# Patient Record
Sex: FEMALE | Race: WHITE | NOT HISPANIC OR LATINO | Employment: OTHER | URBAN - METROPOLITAN AREA
[De-identification: names, ages, dates, MRNs, and addresses within clinical notes are randomized per-mention and may not be internally consistent; named-entity substitution may affect disease eponyms.]

---

## 2017-04-20 RX ORDER — CLONAZEPAM 1 MG/1
TABLET ORAL
Qty: 180 TABLET | Refills: 4 | Status: SHIPPED | OUTPATIENT
Start: 2017-04-20 | End: 2017-05-30 | Stop reason: SDUPTHER

## 2017-05-24 ENCOUNTER — TELEPHONE (OUTPATIENT)
Dept: NEUROLOGY | Facility: CLINIC | Age: 67
End: 2017-05-24

## 2017-05-24 NOTE — TELEPHONE ENCOUNTER
----- Message from Lorenza Pennington sent at 5/24/2017  1:20 PM CDT -----  Contact: Self  Good afternoon,    Pt would like a call back regarding medication interactions. (pt wouldn't give anymore info)    Pt can be reached at     Thank you!

## 2017-05-26 ENCOUNTER — TELEPHONE (OUTPATIENT)
Dept: SLEEP MEDICINE | Facility: CLINIC | Age: 67
End: 2017-05-26

## 2017-05-26 NOTE — TELEPHONE ENCOUNTER
Spoke with pt. She states that Medicare has a quantity limit on clonazepam for 1 a day. Pt needs quantity exception. Medicare phone number on her correspondence is [phone]

## 2017-05-26 NOTE — TELEPHONE ENCOUNTER
----- Message from Belle Sonu sent at 5/26/2017  1:59 PM CDT -----  Contact: pt   X_  1st Request  _  2nd Request  _  3rd Request    Who:LISA MCKEON [[id number]]    Why: Patient states she would like to speak with the staff in regards to her medication not being covered under medicare .... Please contact patient to further discuss and advise      What Number to Call Back: [phone]    When to Expect a call back: (Before the end of the day)   -- if call after 3:00 call back will be tomorrow.

## 2017-05-29 ENCOUNTER — TELEPHONE (OUTPATIENT)
Dept: NEUROLOGY | Facility: CLINIC | Age: 67
End: 2017-05-29

## 2017-05-29 NOTE — TELEPHONE ENCOUNTER
----- Message from Piotr Rivera MD sent at 5/26/2017  2:56 PM CDT -----  Spoke with pt. She states that Medicare has a quantity limit on clonazepam for 1 a day. Pt needs quantity exception. Medicare phone number on her correspondence is [phone]    Documentation     Deepika Dubon MA 45 minutes ago (2:10 PM)     ----- Message from Belle Ascencio sent at 5/26/2017  1:59 PM CDT -----  To call patient re: letter that she got from her insurance.  _  3rd Request     Who:LISA MCKEON [[id number]]     Why: Patient states she would like to speak with the staff in regards to her medication not being covered under medicare .... Please contact patient to further discuss and advise       What Number to Call Back: [phone]     When to Expect a call back: (Before the end of the day)                        -- if call after 3:00 call back will be tomorrow.

## 2017-05-30 ENCOUNTER — TELEPHONE (OUTPATIENT)
Dept: NEUROLOGY | Facility: CLINIC | Age: 67
End: 2017-05-30

## 2017-05-30 ENCOUNTER — PATIENT MESSAGE (OUTPATIENT)
Dept: NEUROLOGY | Facility: CLINIC | Age: 67
End: 2017-05-30

## 2017-05-30 DIAGNOSIS — G40.209 PARTIAL EPILEPSY WITH IMPAIRMENT OF CONSCIOUSNESS: Primary | ICD-10-CM

## 2017-05-30 RX ORDER — CLONAZEPAM 1 MG/1
TABLET ORAL
Qty: 60 TABLET | Refills: 5 | Status: SHIPPED | OUTPATIENT
Start: 2017-05-30 | End: 2017-11-28 | Stop reason: SDUPTHER

## 2017-05-30 RX ORDER — CLONAZEPAM 2 MG/1
2 TABLET ORAL 2 TIMES DAILY
Qty: 60 TABLET | Refills: 5 | Status: SHIPPED | OUTPATIENT
Start: 2017-05-30 | End: 2017-11-26

## 2017-05-30 NOTE — TELEPHONE ENCOUNTER
----- Message from Barbra Malcolm sent at 5/30/2017 12:23 PM CDT -----  Contact: Self  X   1st Request  _  2nd Request  _  3rd Request        Who: LISA MCKEON [[id number]]    Why: ;Pt states she would like to speak to the clinical team regarding medication. Pt provided no further details. Please call,thanks!    What Number to Call Back: [phone]    When to Expect a call back: (Before the end of the day)   -- if the call is after 12:00, the call back will be tomorrow.

## 2017-10-05 DIAGNOSIS — G40.209 COMPLEX PARTIAL SEIZURES WITH CONSCIOUSNESS IMPAIRED: ICD-10-CM

## 2017-10-05 RX ORDER — LEVETIRACETAM 750 MG/1
TABLET ORAL
Qty: 120 TABLET | Refills: 11 | Status: SHIPPED | OUTPATIENT
Start: 2017-10-05 | End: 2018-08-29 | Stop reason: SDUPTHER

## 2017-10-08 DIAGNOSIS — G40.209 COMPLEX PARTIAL SEIZURES WITH CONSCIOUSNESS IMPAIRED: ICD-10-CM

## 2017-10-09 RX ORDER — LAMOTRIGINE 200 MG/1
TABLET ORAL
Qty: 60 TABLET | Refills: 10 | Status: SHIPPED | OUTPATIENT
Start: 2017-10-09 | End: 2018-08-29 | Stop reason: SDUPTHER

## 2017-11-24 DIAGNOSIS — G40.209 PARTIAL EPILEPSY WITH IMPAIRMENT OF CONSCIOUSNESS: ICD-10-CM

## 2017-11-28 DIAGNOSIS — G40.209 PARTIAL EPILEPSY WITH IMPAIRMENT OF CONSCIOUSNESS: ICD-10-CM

## 2017-11-28 RX ORDER — CLONAZEPAM 1 MG/1
TABLET ORAL
Qty: 60 TABLET | Refills: 5 | Status: SHIPPED | OUTPATIENT
Start: 2017-11-28 | End: 2018-05-17 | Stop reason: SDUPTHER

## 2017-11-28 NOTE — TELEPHONE ENCOUNTER
----- Message from Onelia eMyer sent at 11/28/2017 11:53 AM CST -----  Contact: ONELIA MCKEON [[id number]]      x_  1st Request  _  2nd Request  _  3rd Request    Please refill the medication(s) listed below. Please call the patient when the prescription(s) is ready for  at this phone number   [phone]         Medication #1 clonazePAM (KLONOPIN) 1 MG tablet 60 tablet   Medication #2      Preferred Pharmacy:

## 2017-11-28 NOTE — TELEPHONE ENCOUNTER
Pt was informed Julio Rivera is out of the office, Request this medication be sent to someone to refill. Please advise.

## 2017-11-30 ENCOUNTER — TELEPHONE (OUTPATIENT)
Dept: NEUROLOGY | Facility: CLINIC | Age: 67
End: 2017-11-30

## 2017-11-30 DIAGNOSIS — G40.909 NONINTRACTABLE EPILEPSY WITHOUT STATUS EPILEPTICUS, UNSPECIFIED EPILEPSY TYPE: Primary | ICD-10-CM

## 2017-11-30 RX ORDER — CLONAZEPAM 2 MG/1
2 TABLET ORAL 2 TIMES DAILY
Qty: 60 TABLET | Refills: 5 | Status: SHIPPED | OUTPATIENT
Start: 2017-11-30 | End: 2017-12-30

## 2017-11-30 NOTE — TELEPHONE ENCOUNTER
----- Message from Brandon Pantoja sent at 11/30/2017  9:58 AM CST -----  Contact: Bobbi sawyer/ Melvin Luna @ [phone]  Bobbi says they received script for clonazePAM (KLONOPIN) 1 MG tablet, but pt is asking for clonazePAM (KLONOPIN) 2 MG tablet. Bobbi states pt is currently out of the medication. Pls call.

## 2017-12-04 RX ORDER — CLONAZEPAM 2 MG/1
TABLET ORAL
Qty: 60 TABLET | Refills: 4 | OUTPATIENT
Start: 2017-12-04

## 2017-12-04 RX ORDER — CLONAZEPAM 1 MG/1
TABLET ORAL
Qty: 60 TABLET | Refills: 4 | OUTPATIENT
Start: 2017-12-04

## 2018-01-02 ENCOUNTER — TELEPHONE (OUTPATIENT)
Dept: NEUROLOGY | Facility: CLINIC | Age: 68
End: 2018-01-02

## 2018-05-09 RX ORDER — RALOXIFENE HYDROCHLORIDE 60 MG/1
60 TABLET, FILM COATED ORAL DAILY
Qty: 30 TABLET | Refills: 3 | Status: SHIPPED | OUTPATIENT
Start: 2018-05-09 | End: 2018-08-21

## 2018-05-09 NOTE — TELEPHONE ENCOUNTER
----- Message from Tanvi Jacobs sent at 5/9/2018 11:04 AM CDT -----  [facility]  Can the clinic reply in [facility]:     Please refill the medication(s) listed below. Please call the patient when the prescription(s) is ready for  at this phone number          Medication #1 raloxifene (EVISTA) 60 mg tablet  Medication #2    [facility] [address] [phone]

## 2018-05-17 DIAGNOSIS — G40.209 PARTIAL EPILEPSY WITH IMPAIRMENT OF CONSCIOUSNESS: ICD-10-CM

## 2018-05-17 RX ORDER — CLONAZEPAM 1 MG/1
TABLET ORAL
Qty: 60 TABLET | Refills: 5 | Status: SHIPPED | OUTPATIENT
Start: 2018-05-17 | End: 2018-05-22 | Stop reason: SDUPTHER

## 2018-05-17 NOTE — TELEPHONE ENCOUNTER
----- Message from Claire Archuleta sent at 5/17/2018 12:34 PM CDT -----  Contact: elio  Can the clinic reply in [facility]: no      Please refill the medication(s) listed below. The patient can be reached at this phone number ([phone]_) once it is called into the pharmacy.      Medication #1clonazePAM (KLONOPIN) 1 MG tablet - she wants 2mg      Medication #2      Preferred Pharmacy:[facility] [phone]

## 2018-05-22 ENCOUNTER — TELEPHONE (OUTPATIENT)
Dept: NEUROLOGY | Facility: CLINIC | Age: 68
End: 2018-05-22

## 2018-05-22 DIAGNOSIS — G40.209 PARTIAL EPILEPSY WITH IMPAIRMENT OF CONSCIOUSNESS: ICD-10-CM

## 2018-05-22 RX ORDER — CLONAZEPAM 1 MG/1
TABLET ORAL
Qty: 60 TABLET | Refills: 5 | Status: SHIPPED | OUTPATIENT
Start: 2018-05-22 | End: 2018-09-05 | Stop reason: SDUPTHER

## 2018-05-22 RX ORDER — CLONAZEPAM 2 MG/1
TABLET ORAL
Qty: 60 TABLET | Refills: 5 | Status: SHIPPED | OUTPATIENT
Start: 2018-05-22 | End: 2018-06-11 | Stop reason: SDUPTHER

## 2018-05-22 NOTE — TELEPHONE ENCOUNTER
----- Message from Lisa Meyer sent at 5/22/2018  2:27 PM CDT -----  Contact: LISA MCKEON [[id number]]            Name of Who is Calling:LISA MCKEON [[id number]]      What is the request in detail: Patient called she stated that she is trying to get her medication, the pharmacist tried as well. Please call her as soon as possible, she is down to the last 2 pills. clonazePAM (KLONOPIN) 1 MG tablet 60 tablet       Can the clinic reply by [facility]: Yes      What Number to Call Back if not in [facility]: [phone]

## 2018-06-11 ENCOUNTER — TELEPHONE (OUTPATIENT)
Dept: NEUROLOGY | Facility: CLINIC | Age: 68
End: 2018-06-11

## 2018-06-11 DIAGNOSIS — G40.209 PARTIAL EPILEPSY WITH IMPAIRMENT OF CONSCIOUSNESS: Primary | ICD-10-CM

## 2018-06-11 RX ORDER — BENZONATATE 100 MG/1
CAPSULE ORAL
COMMUNITY
Start: 2018-03-14 | End: 2018-08-21

## 2018-06-11 RX ORDER — AZITHROMYCIN 250 MG/1
TABLET, FILM COATED ORAL
COMMUNITY
Start: 2018-03-15 | End: 2018-08-21

## 2018-06-11 RX ORDER — CLONAZEPAM 2 MG/1
2 TABLET ORAL 2 TIMES DAILY
Qty: 4 TABLET | Refills: 0 | Status: SHIPPED | OUTPATIENT
Start: 2018-06-11 | End: 2018-06-12 | Stop reason: SDUPTHER

## 2018-06-11 RX ORDER — CYCLOSPORINE 0.5 MG/ML
1 EMULSION OPHTHALMIC 2 TIMES DAILY
Refills: 6 | COMMUNITY
Start: 2018-05-29 | End: 2021-08-04

## 2018-06-11 NOTE — TELEPHONE ENCOUNTER
----- Message from Krystyna Shepherd sent at 6/11/2018  8:37 AM CDT -----  Name of Who is Calling: LISA MCKEON [[id number]]      What is the request in detail: Pt would like to speak with staff about lonazePAM (KLONOPIN) 2 MG Tab medication. Please call as soon as possible    Can the clinic reply by [facility]:   No       What [facility] Back [facility]: [phone]

## 2018-06-11 NOTE — TELEPHONE ENCOUNTER
Returned patient's call and she stated that she is leaving for a trip on 6/17 and wish for Dr Villareal to send in a Rx for 4 tablets of Klonopin.  When she had called the pharmacy, they told her that she will have to pay because it's before her refill due date the 19th but she will be on the road and she needs med for seizure prevention.    Last office visit 4/22/16

## 2018-06-12 ENCOUNTER — TELEPHONE (OUTPATIENT)
Dept: NEUROLOGY | Facility: CLINIC | Age: 68
End: 2018-06-12

## 2018-06-12 DIAGNOSIS — G40.209 PARTIAL EPILEPSY WITH IMPAIRMENT OF CONSCIOUSNESS: ICD-10-CM

## 2018-06-12 RX ORDER — CLONAZEPAM 2 MG/1
2 TABLET ORAL 2 TIMES DAILY
Qty: 60 TABLET | Refills: 0 | Status: SHIPPED | OUTPATIENT
Start: 2018-06-12 | End: 2018-06-15 | Stop reason: SDUPTHER

## 2018-06-12 NOTE — TELEPHONE ENCOUNTER
Prescription for Klonopin 2 mg for 4 tabs has been cancelled as her  will  a new prescription at our [place] office for 2mgs # 60 tabs. This is needed for coming vacation.

## 2018-06-12 NOTE — TELEPHONE ENCOUNTER
----- Message from Carolina Fagan sent at 6/12/2018  1:25 PM CDT -----  Contact: LISA MCKEON [[id number]]            Name of Who is Calling: LISA MCKEON [[id number]]      What is the request in detail:pt states that pharmacy never received rx,pt is requesting a call back       Can the clinic reply by [facility]: no      What Number to Call Back if not in [facility]:[phone]

## 2018-06-15 DIAGNOSIS — G40.209 PARTIAL EPILEPSY WITH IMPAIRMENT OF CONSCIOUSNESS: Primary | ICD-10-CM

## 2018-06-15 RX ORDER — CLONAZEPAM 2 MG/1
2 TABLET ORAL 2 TIMES DAILY
Qty: 60 TABLET | Refills: 0 | Status: SHIPPED | OUTPATIENT
Start: 2018-06-15 | End: 2018-07-13 | Stop reason: SDUPTHER

## 2018-07-13 ENCOUNTER — TELEPHONE (OUTPATIENT)
Dept: NEUROLOGY | Facility: CLINIC | Age: 68
End: 2018-07-13

## 2018-07-13 DIAGNOSIS — G40.209 PARTIAL EPILEPSY WITH IMPAIRMENT OF CONSCIOUSNESS: ICD-10-CM

## 2018-07-13 RX ORDER — CLONAZEPAM 2 MG/1
2 TABLET ORAL 2 TIMES DAILY
Qty: 60 TABLET | Refills: 5 | Status: SHIPPED | OUTPATIENT
Start: 2018-07-13 | End: 2018-09-05 | Stop reason: SDUPTHER

## 2018-07-13 NOTE — TELEPHONE ENCOUNTER
----- Message from Asya Garcia sent at 7/13/2018 12:05 PM CDT -----  Contact: LISA MCKEON [[id number]]      Can the clinic reply in [facility]: No      Please refill the medication(s) listed below. Please call the patient when the prescription(s) is ready for  at this phone number: LISA MCKEON / #[phone]      Medication #1 clonazePAM (KLONOPIN) 2 MG Tab      Preferred Pharmacy: [facility]/pharmacy #[id number] - [address]     [phone] (Phone)  [phone] (Fax)

## 2018-07-18 ENCOUNTER — TELEPHONE (OUTPATIENT)
Dept: NEUROLOGY | Facility: CLINIC | Age: 68
End: 2018-07-18

## 2018-07-18 NOTE — TELEPHONE ENCOUNTER
----- Message from Tanvi Jacobs sent at 7/18/2018 11:26 AM CDT -----  Contact: pt            Name of Who is Calling: LISA MCKEON [[id number]]      What is the request in detail: pt is calling in regards to time change on upcoming trip.. Please advise      Can the clinic reply by [facility]: no      What Number to Call Back if not in [facility]: [phone]

## 2018-07-19 ENCOUNTER — TELEPHONE (OUTPATIENT)
Dept: NEUROLOGY | Facility: CLINIC | Age: 68
End: 2018-07-19

## 2018-07-19 NOTE — TELEPHONE ENCOUNTER
----- Message from Belle Ascencio sent at 7/19/2018  2:29 PM CDT -----  Contact: Pt  Name of Who is Calling: LISA MCKEON [[id number]]      What is the request in detail: Patient states she is returning a call      Can the clinic reply by [facility]: No      What Number to Call Back [facility] not in [facility]: [phone]

## 2018-07-31 ENCOUNTER — TELEPHONE (OUTPATIENT)
Dept: NEUROLOGY | Facility: CLINIC | Age: 68
End: 2018-07-31

## 2018-07-31 NOTE — TELEPHONE ENCOUNTER
Discussed with patient.  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  Advised that these should not be a problems as long as she gets a good night sleep and keeps to her schedule of medications.

## 2018-08-01 LAB
CHOL/HDLC RATIO: 2
CHOLEST SERPL-MSCNC: 188 MG/DL (ref 0–200)
HDLC SERPL-MCNC: 80 MG/DL
LDLC SERPL CALC-MCNC: 96 MG/DL
TRIGLYCERIDE (LIPID PAN): 77

## 2018-08-21 ENCOUNTER — OFFICE VISIT (OUTPATIENT)
Dept: NEUROLOGY | Facility: CLINIC | Age: 68
End: 2018-08-21
Payer: COMMERCIAL

## 2018-08-21 VITALS
WEIGHT: 136.69 LBS | SYSTOLIC BLOOD PRESSURE: 105 MMHG | BODY MASS INDEX: 22.77 KG/M2 | HEIGHT: 65 IN | DIASTOLIC BLOOD PRESSURE: 63 MMHG

## 2018-08-21 DIAGNOSIS — G40.209 PARTIAL EPILEPSY WITH IMPAIRMENT OF CONSCIOUSNESS: Primary | ICD-10-CM

## 2018-08-21 PROCEDURE — 99401 PREV MED CNSL INDIV APPRX 15: CPT | Mod: S$GLB,,, | Performed by: PSYCHIATRY & NEUROLOGY

## 2018-08-21 PROCEDURE — 99999 PR PBB SHADOW E&M-EST. PATIENT-LVL III: CPT | Mod: PBBFAC,,, | Performed by: PSYCHIATRY & NEUROLOGY

## 2018-08-21 PROCEDURE — 99214 OFFICE O/P EST MOD 30 MIN: CPT | Mod: S$GLB,,, | Performed by: PSYCHIATRY & NEUROLOGY

## 2018-08-21 RX ORDER — LIFITEGRAST 50 MG/ML
SOLUTION/ DROPS OPHTHALMIC
COMMUNITY
Start: 2018-08-16 | End: 2018-08-21

## 2018-08-21 NOTE — PATIENT INSTRUCTIONS
No medication changes.  Continue present level of activities.  Seizure precautions including compliance stressed. Reviewed immunization history.  Patient counseled about getting her immunization shots and is given a prescription for this.

## 2018-08-21 NOTE — PROGRESS NOTES
Subjective:       Patient ID: Onelia Cain is a 68 y.o. female.    Chief Complaint:  Seizures      History of Present Illness  HPI   This is a 68-year-old female is being followed by me for a seizure disorder. She has had a long history of partial complex seizures characterized by transient alteration in consciousness with amnesia and occasional repetitive movements affecting the hands and mouth. She had an MRI scan of the brain done in 1992 that was normal. An EEG done at that time showed evidence of right frontal sharp focus. She had been doing well with a combination of Keppra, Lamictal and clonazepam and has had no spells in over a year.  In the past her seizures have been brief and were usually triggered by stress at work.  She is now retired.  She does report that she has had occasional episodes when she feels she might have had a spells but this passes.  This has been very infrequent usually triggered by excessive exertion on stress.  she was last seen by me 2 years ago and denies any new medical problems otherwise.  She came to the clinic accompanied by her .  She is planned to her vacation to [place] next month.       Review of Systems  Review of Systems   Constitutional: Negative.    HENT: Negative.  Negative for hearing loss.    Eyes: Negative.  Negative for visual disturbance.   Respiratory: Negative.  Negative for shortness of breath.    Cardiovascular: Negative.  Negative for chest pain and palpitations.   Gastrointestinal: Negative.    Genitourinary: Negative.    Musculoskeletal: Negative.  Negative for back pain, gait problem and neck pain.   Skin: Negative.    Neurological: Positive for seizures. Negative for tremors, syncope, speech difficulty, weakness, numbness and headaches.   Psychiatric/Behavioral: Negative.  Negative for confusion and decreased concentration.       Objective:      Neurologic Exam      Physical Exam   Constitutional: She appears well-developed and  well-nourished.   HENT:   Head: Normocephalic and atraumatic.   Right Ear: Hearing normal.   Left Ear: Hearing normal.   Eyes:   Fundus examination showed sharp disc margins.   Neck: Normal range of motion. Neck supple. Carotid bruit is not present.   Neurological: She is alert. She has normal reflexes. She displays no atrophy. No cranial nerve deficit (Visual fields at bedside testing essentially normal.  No facial asymmetry noted with facial movements and sensory exam being normal/symmetrical.  Corneals/gag reflexes normal.  Tongue & palate movements normal.  Shoulder shrug was normal.) or sensory deficit. She exhibits normal muscle tone. She displays a negative Romberg sign. Coordination and gait normal.   Mental status examination: Patient is fully oriented and able to give an adequate history.  Recall of recent and past information is good.  Immediate recall is normal.  Attention span and concentration was normal.  Judgment and insight is normal.  Language functions are intact with no evidence of aphasia or dysarthria.  Comprehension is unimpaired.  Affect is appropriate, mood was even.  No thought disorder is noted.   Vitals reviewed.        Assessment:        1. Partial epilepsy with impairment of consciousness             Plan:       No medication changes.  Continue present level of activities.  Seizure precautions including compliance stressed. Reviewed immunization history.  Patient counseled about getting her immunization shots and is given a prescription for this.  Follow-up in one year if stable.

## 2018-08-29 ENCOUNTER — TELEPHONE (OUTPATIENT)
Dept: NEUROLOGY | Facility: CLINIC | Age: 68
End: 2018-08-29

## 2018-08-29 DIAGNOSIS — G40.209 COMPLEX PARTIAL SEIZURES WITH CONSCIOUSNESS IMPAIRED: ICD-10-CM

## 2018-08-29 RX ORDER — LEVETIRACETAM 750 MG/1
750 TABLET ORAL 4 TIMES DAILY
Qty: 120 TABLET | Refills: 11 | Status: SHIPPED | OUTPATIENT
Start: 2018-08-29 | End: 2018-09-04 | Stop reason: SDUPTHER

## 2018-08-29 RX ORDER — LAMOTRIGINE 200 MG/1
200 TABLET ORAL 2 TIMES DAILY
Qty: 60 TABLET | Refills: 11 | Status: SHIPPED | OUTPATIENT
Start: 2018-08-29 | End: 2019-07-23 | Stop reason: SDUPTHER

## 2018-08-30 NOTE — TELEPHONE ENCOUNTER
----- Message from Dorian Rider sent at 8/30/2018  2:56 PM CDT -----            Name of Who is Calling: LISA MCKEON [[id number]]      What is the request in detail: Pt is calling regarding her the email Dr. Villareal is requesting to his personal email. This is reminder that the pt will email the medicines needed tomorrow on his personal email address.       Can the clinic reply by [facility]:no      What Number to Call Back if not in [facility]: [phone]

## 2018-09-04 DIAGNOSIS — G40.209 COMPLEX PARTIAL SEIZURES WITH CONSCIOUSNESS IMPAIRED: ICD-10-CM

## 2018-09-04 DIAGNOSIS — G40.209 PARTIAL EPILEPSY WITH IMPAIRMENT OF CONSCIOUSNESS: ICD-10-CM

## 2018-09-05 ENCOUNTER — TELEPHONE (OUTPATIENT)
Dept: NEUROLOGY | Facility: CLINIC | Age: 68
End: 2018-09-05

## 2018-09-05 RX ORDER — LEVETIRACETAM 750 MG/1
750 TABLET ORAL 4 TIMES DAILY
Qty: 120 TABLET | Refills: 11 | Status: SHIPPED | OUTPATIENT
Start: 2018-09-05 | End: 2019-10-29 | Stop reason: SDUPTHER

## 2018-09-05 RX ORDER — CLONAZEPAM 2 MG/1
2 TABLET ORAL 2 TIMES DAILY
Qty: 60 TABLET | Refills: 5 | Status: SHIPPED | OUTPATIENT
Start: 2018-09-06 | End: 2019-03-05

## 2018-09-05 RX ORDER — CLONAZEPAM 1 MG/1
TABLET ORAL
Qty: 60 TABLET | Refills: 5 | Status: SHIPPED | OUTPATIENT
Start: 2018-09-06 | End: 2019-03-06 | Stop reason: SDUPTHER

## 2018-09-05 NOTE — TELEPHONE ENCOUNTER
Prescriptions have been sent to the pharmacy and the schedule for dosing will be sent to the patient by e-mail today

## 2018-09-05 NOTE — TELEPHONE ENCOUNTER
----- Message from Kimberly Wade sent at 9/5/2018  2:12 PM CDT -----  Contact: LISA MCKEON [[id number]]            Name of Who is Calling: LISA MCKEON [[id number]]      What is the request in detail: Pt is calling to get the times that she's needing to take the medications.  Pt also says thank you for filling her medications and that he can email the times she should take the medications.  Please contact pt to further discuss and advise.      Can the clinic reply by [facility]: No    What Number to Call Back if not in [facility]: [phone]

## 2018-09-05 NOTE — TELEPHONE ENCOUNTER
----- Message from Krystyna Shepherd sent at 9/5/2018  8:00 AM CDT -----  Name of Who is Calling: LISA MCKEON [[id number]]    What is the request in detail: Pt checking on the status of the refill for clonazePAM (KLONOPIN) 1 MG tablet and clonazePAM (KLONOPIN) 2 MG tablet. Pt states she's leaving for [place] on sept 10. She will need approval to get the medication filled sooner.       Can the clinic reply by [facility]:  No        What Number to Call Back if not in [facility]:[phone]

## 2019-01-13 DIAGNOSIS — G40.209 PARTIAL EPILEPSY WITH IMPAIRMENT OF CONSCIOUSNESS: ICD-10-CM

## 2019-01-14 RX ORDER — CLONAZEPAM 2 MG/1
2 TABLET ORAL 2 TIMES DAILY
Qty: 60 TABLET | Refills: 5 | Status: SHIPPED | OUTPATIENT
Start: 2019-01-14 | End: 2019-07-16 | Stop reason: SDUPTHER

## 2019-03-06 DIAGNOSIS — G40.209 PARTIAL EPILEPSY WITH IMPAIRMENT OF CONSCIOUSNESS: ICD-10-CM

## 2019-03-06 RX ORDER — CLONAZEPAM 1 MG/1
TABLET ORAL
Qty: 60 TABLET | Refills: 5 | Status: SHIPPED | OUTPATIENT
Start: 2019-03-06 | End: 2019-08-13 | Stop reason: SDUPTHER

## 2019-07-16 DIAGNOSIS — G40.209 PARTIAL EPILEPSY WITH IMPAIRMENT OF CONSCIOUSNESS: ICD-10-CM

## 2019-07-16 RX ORDER — CLONAZEPAM 2 MG/1
2 TABLET ORAL 2 TIMES DAILY
Qty: 60 TABLET | Refills: 2 | Status: SHIPPED | OUTPATIENT
Start: 2019-07-16 | End: 2019-08-13 | Stop reason: SDUPTHER

## 2019-07-22 DIAGNOSIS — G40.209 COMPLEX PARTIAL SEIZURES WITH CONSCIOUSNESS IMPAIRED: ICD-10-CM

## 2019-07-23 RX ORDER — LAMOTRIGINE 200 MG/1
TABLET ORAL
Qty: 180 TABLET | Refills: 3 | Status: SHIPPED | OUTPATIENT
Start: 2019-07-23 | End: 2020-07-07 | Stop reason: SDUPTHER

## 2019-08-13 DIAGNOSIS — G40.209 PARTIAL EPILEPSY WITH IMPAIRMENT OF CONSCIOUSNESS: ICD-10-CM

## 2019-08-13 RX ORDER — CLONAZEPAM 2 MG/1
2 TABLET ORAL 2 TIMES DAILY
Qty: 60 TABLET | Refills: 2 | Status: SHIPPED | OUTPATIENT
Start: 2019-08-13 | End: 2020-01-13

## 2019-08-13 RX ORDER — CLONAZEPAM 1 MG/1
TABLET ORAL
Qty: 60 TABLET | Refills: 2 | Status: SHIPPED | OUTPATIENT
Start: 2019-08-13 | End: 2019-09-05 | Stop reason: SDUPTHER

## 2019-08-21 ENCOUNTER — TELEPHONE (OUTPATIENT)
Dept: NEUROLOGY | Facility: CLINIC | Age: 69
End: 2019-08-21

## 2019-08-21 NOTE — TELEPHONE ENCOUNTER
----- Message from Krystin Mcgee sent at 8/21/2019  4:16 PM CDT -----  Contact: self @ [phone]  This is a prior pt of Dr Rivera.  She would like to thank Dr Thomas for sending in her prescriptions for clonazePAM (KLONOPIN) 2 MG Tab and clonazePAM (KLONOPIN) 1 MG Tab without having seen her yet.  She says she really appreciates it and God Bless!

## 2019-09-05 ENCOUNTER — TELEPHONE (OUTPATIENT)
Dept: NEUROLOGY | Facility: CLINIC | Age: 69
End: 2019-09-05

## 2019-09-05 DIAGNOSIS — G40.209 PARTIAL EPILEPSY WITH IMPAIRMENT OF CONSCIOUSNESS: ICD-10-CM

## 2019-09-05 RX ORDER — CLONAZEPAM 1 MG/1
TABLET ORAL
Qty: 60 TABLET | Refills: 1 | OUTPATIENT
Start: 2019-09-05

## 2019-09-05 RX ORDER — CLONAZEPAM 1 MG/1
TABLET ORAL
Qty: 60 TABLET | Refills: 1 | Status: SHIPPED | OUTPATIENT
Start: 2019-09-05 | End: 2019-10-31 | Stop reason: SDUPTHER

## 2019-09-05 NOTE — TELEPHONE ENCOUNTER
----- Message from Sharath Bernal MA sent at 9/4/2019  3:32 PM CDT -----  Contact: Pt      ----- Message -----  From: Wally Wagner  Sent: 9/4/2019   3:16 PM  To: Leonides CRUZ Staff    Rx Refill/Request     Is this a Refill or New Rx:  Refill    Rx Name and Strength:  clonazePAM (KLONOPIN) 1 MG tablet    Preferred Pharmacy with phone number: [facility] #[id number] - [address] - Phone # [phone]    Communication Preference:[phone]    Additional Information:

## 2019-09-05 NOTE — TELEPHONE ENCOUNTER
----- Message from Gini Gold sent at 9/5/2019  9:02 AM CDT -----  Contact: pt at [phone]  Rx Refill/Request     Is this a Refill or New Rx:  New rx   Rx Name and Strength:  Clonazepam  1mg  Qty 30  [facility] [phone]  Communication Preference:  Call in today. Pt is taking her last pill today  Additional Information: Please take care of today.  Call pt when taken care of.

## 2019-10-01 ENCOUNTER — OFFICE VISIT (OUTPATIENT)
Dept: NEUROLOGY | Facility: CLINIC | Age: 69
End: 2019-10-01
Payer: MEDICARE

## 2019-10-01 VITALS — WEIGHT: 138.44 LBS | BODY MASS INDEX: 23.07 KG/M2 | HEIGHT: 65 IN

## 2019-10-01 DIAGNOSIS — G40.209 PARTIAL EPILEPSY WITH IMPAIRMENT OF CONSCIOUSNESS: Primary | ICD-10-CM

## 2019-10-01 PROCEDURE — 99213 OFFICE O/P EST LOW 20 MIN: CPT | Mod: PBBFAC | Performed by: PSYCHIATRY & NEUROLOGY

## 2019-10-01 PROCEDURE — 99214 OFFICE O/P EST MOD 30 MIN: CPT | Mod: S$PBB,,, | Performed by: PSYCHIATRY & NEUROLOGY

## 2019-10-01 PROCEDURE — 99999 PR PBB SHADOW E&M-EST. PATIENT-LVL III: CPT | Mod: PBBFAC,,, | Performed by: PSYCHIATRY & NEUROLOGY

## 2019-10-01 PROCEDURE — 99999 PR PBB SHADOW E&M-EST. PATIENT-LVL III: ICD-10-PCS | Mod: PBBFAC,,, | Performed by: PSYCHIATRY & NEUROLOGY

## 2019-10-01 PROCEDURE — 99214 PR OFFICE/OUTPT VISIT, EST, LEVL IV, 30-39 MIN: ICD-10-PCS | Mod: S$PBB,,, | Performed by: PSYCHIATRY & NEUROLOGY

## 2019-10-01 NOTE — PROGRESS NOTES
"Name: Onelia Duarte  MRN: [id number]   CSN: [id number]      Date: 10/01/2019    HISTORY OF PRESENT ILLNESS (HPI)  The patient is a 69 y.o. presents for follow up for epilepsy    Onset was at age 12 and she had single generalized convulsion prior to Erica in the setting of medication changes.     Per MILLER'Mara 2018:  "She has had a long history of partial complex seizures characterized by transient alteration in consciousness with amnesia and occasional repetitive movements affecting the hands and mouth. She had an MRI scan of the brain done in 1992 that was normal. An EEG done at that time showed evidence of right frontal sharp focus. She had been doing well with a combination of Keppra, Lamictal and clonazepam and has had no spells in over a year.  In the past her seizures have been brief and were usually triggered by stress at work.  She is now retired.  She does report that she has had occasional episodes when she feels she might have had a spells but this passes.  This has been very infrequent usually triggered by excessive exertion on stress.  she was last seen by me 2 years ago and denies any new medical problems otherwise.  She came to the clinic accompanied by her .  She is planned to her vacation to [place] next month."      Interim History  Continues with occasional episodes of transient staring for a few seconds followed by 1-2 minutes confusion, patient and  estimate this happens about 4/year with no escalation in frequency in recent years.  She has never driven.    ED visits  Episodes of SE  Change in meds    Seizure Seminology  Seizure Type 1  Classification:   Aura -   Ictus  - Nonconv -  - Conv -  - Duration -   Post-ictal  - Symptoms  - Duration  Age of onset   Current Seizure Frequency -    Last Seizure      sz per month 2019 2020   Kamran     Feb     Mar     Apr     May 1    Kj     Jul     Aug     Sep     Oct     Nov     Dec     Tot       Seizure Triggers  Sleep Deprivation - "  None  Other medications -  None   Benadral   Tramadol   Other  Psych/stress -  None  Photic stimulation -  None  Hyperventilation -  None  Medical Problems -  None  Menses -   No  Sensory Stimulation    Light  No   Sound  No   Problem Solv No   Other  No  Missed dose of Rx None    AED Treatments  Present regimen  lamotrigine (Lamictal, LTG) 200mg bid  levetiracetam (Keppra, LEV) 750mg qid  clonazepam (Klonopin, CZP) 1/2/1/2    Prior treatments  phenytoin (Dilantin, PHT)  primidone (Mysoline, PRM)  Not tried  acetazolamide (Diamox, AZM)  Amantadine  brivitiracetam (Briviact, BRV)  carbamazepine (Tegretol, CBZ)  clobazam (Onfi or Frizium, CLB)  ethosuximide (Zarontin, ESM)  eslicarbazine (Aptiom, ESL)  felbamate (felbatol, FBM)  gabapentin (Neurontin, GPN)  lacosamide (Vimpat, LCS)   methsuximide (Celontin, MSM)  oxcarbazepine (Trileptal OXC)  perampanel (Fycompa, FCP)   phenobarbital (Pb)  pregabalin (Lyrica, PGB)  rufinamide (Banzel, RUF)  tiagabine (Gabatril,  TGB)  topiramate (Topamax, TPM)  viagabatrin, (Sabril, VGB)  vagal nerve stimulator (VNS)  valproic acid (Depakote, VPA)  zonisamide (Zonegran, ZNA)  Benzodiazepines  diazepam - rectal (Diastatl)  diazepam - oral (Valium, DZ)  clorazepate (Tranxene, CLZ)  Ativan  Brain Stimulation  Vagal Nerve Stimulation-n/a  DBS- n/a    Adherence/Compliance method  Memory - yes  Mom or Spouse - Yes  Pill Box - no  Bird calendar - no  Turn over medication bottle - no  Phone alarm - no    Seizure Evaluation  EEG Routine -   EEG Ambulatory -   EEG\Video Monitoring -   MRI/MRA -   CT/CTA Scan -   PET Scan -   Neuropsychological evaluation -   DEXA Scan    Potential Epilepsy Risk Factors:   Pregnancy/Labor/Delivery - full term uncomplicated pregnancy labor and vaginal delivery  Febrile seizures - none  Head injury  - none  CNS infection - none     Stroke - none  Family Hx of Sz - none    PAST MEDICAL HISTORY:   Active Ambulatory Problems     Diagnosis Date Noted    Partial  epilepsy with impairment of consciousness 07/29/2012    Osteoporosis      Resolved Ambulatory Problems     Diagnosis Date Noted    No Resolved Ambulatory Problems     Past Medical History:   Diagnosis Date    Seizures         PAST SURGICAL HISTORY:   Past Surgical History:   Procedure Laterality Date    HYSTERECTOMY          FAMILY HISTORY: No family history on file.      SOCIAL HISTORY:   Social History     Socioeconomic History    Marital status:      Spouse name: Not on file    Number of children: Not on file    Years of education: Not on file    Highest education level: Not on file   Occupational History    Not on file   Social Needs    Financial resource strain: Not on file    Food insecurity:     Worry: Not on file     Inability: Not on file    Transportation needs:     Medical: Not on file     Non-medical: Not on file   Tobacco Use    Smoking status: Never Smoker    Smokeless tobacco: Never Used   Substance and Sexual Activity    Alcohol use: No    Drug use: No    Sexual activity: Not on file   Lifestyle    Physical activity:     Days per week: Not on file     Minutes per session: Not on file    Stress: Not on file   Relationships    Social connections:     Talks on phone: Not on file     Gets together: Not on file     Attends Sabianism service: Not on file     Active member of club or organization: Not on file     Attends meetings of clubs or organizations: Not on file     Relationship status: Not on file   Other Topics Concern    Not on file   Social History Narrative    Not on file        SUBSTANCE USE:  Social History     Tobacco Use    Smoking status: Never Smoker    Smokeless tobacco: Never Used   Substance and Sexual Activity    Alcohol use: No    Drug use: No    Sexual activity: Not on file      Social History     Tobacco Use    Smoking status: Never Smoker    Smokeless tobacco: Never Used   Substance Use Topics    Alcohol use: No        ALLERGIES: Codeine and  "Oxycodone-acetaminophen     Ht 5' 5" (1.651 m)   Wt 62.8 kg (138 lb 7.2 oz)   BMI 23.04 kg/m²     Higher Cortical Function:    Patient is a well developed, pleasant, well groomed individual appearing their stated age  Oriented - intact to person, place and time and followed two step instruction correctly.    Fund of knowledge was appropriate.    R-L Orientation - Intact  Language - Speech was fluent without evidence for an aphasia.    Cranial Nerves II - XII:    EOMs were intact with normal smooth and no nystagmus.    PERRLA. D/C  Visual fields were full to confrontation.    Motor - facial movement was symmetrical and normal.    Facial sensory - Light touch and pin prick sensations were normal.    Hearing was normal to finger rub.  Palate moved well and was symmetrical with normal palatal and oral sensation.    Tongue movement was full & the patient could say "la la la" and "Ka Ka Ka" without  difficulty. Normal power and bulk was found in the massiter and rotator muscles of the neck.  Motor: Power, bulk and tone were normal in all extremities.  Sensory: Light touch, pin prick, vibration and position senses were normal in all extremities.    Coordination:       Rapid alternating movements and rapid finger tapping - normal.       Finger to nose - nl.       Arm roll - symmetrical.    Gait:  Station, gait and tandem walking were done without difficulty and Romberg was negative.    Deep tendon reflexes:    Reflex L R   Bicpets 2+ 2+   Tricepts 2+ 2+   Brachio-radialis 2+ 2+   Knee 2+ 2+   Ankle 2+ 2+   Babinski No No     Tremor: resting, postural, intentional - none    Pulses    Carotids - strong without bruits    Peripheral - strong and symmetrical      IMPRESSION  1. 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

## 2019-10-29 DIAGNOSIS — G40.209 COMPLEX PARTIAL SEIZURES WITH CONSCIOUSNESS IMPAIRED: ICD-10-CM

## 2019-10-29 RX ORDER — LEVETIRACETAM 750 MG/1
750 TABLET ORAL 4 TIMES DAILY
Qty: 360 TABLET | Refills: 3 | Status: SHIPPED | OUTPATIENT
Start: 2019-10-29 | End: 2020-08-07

## 2019-10-31 DIAGNOSIS — G40.209 PARTIAL EPILEPSY WITH IMPAIRMENT OF CONSCIOUSNESS: ICD-10-CM

## 2019-10-31 RX ORDER — CLONAZEPAM 1 MG/1
TABLET ORAL
Qty: 60 TABLET | Refills: 5 | Status: SHIPPED | OUTPATIENT
Start: 2019-10-31 | End: 2020-01-13

## 2020-01-12 DIAGNOSIS — G40.209 PARTIAL EPILEPSY WITH IMPAIRMENT OF CONSCIOUSNESS: ICD-10-CM

## 2020-01-13 RX ORDER — CLONAZEPAM 2 MG/1
2 TABLET ORAL 2 TIMES DAILY
Qty: 60 TABLET | Refills: 5 | Status: SHIPPED | OUTPATIENT
Start: 2020-01-13 | End: 2020-01-13

## 2020-01-13 RX ORDER — CLONAZEPAM 2 MG/1
2 TABLET ORAL 2 TIMES DAILY
Qty: 60 TABLET | Refills: 5 | Status: SHIPPED | OUTPATIENT
Start: 2020-01-13 | End: 2020-07-07 | Stop reason: SDUPTHER

## 2020-03-03 ENCOUNTER — OFFICE VISIT (OUTPATIENT)
Dept: NEUROLOGY | Facility: CLINIC | Age: 70
End: 2020-03-03
Payer: MEDICARE

## 2020-03-03 VITALS
BODY MASS INDEX: 22.99 KG/M2 | HEIGHT: 65 IN | HEART RATE: 84 BPM | DIASTOLIC BLOOD PRESSURE: 79 MMHG | WEIGHT: 138 LBS | SYSTOLIC BLOOD PRESSURE: 133 MMHG

## 2020-03-03 DIAGNOSIS — G40.209 PARTIAL EPILEPSY WITH IMPAIRMENT OF CONSCIOUSNESS: Primary | ICD-10-CM

## 2020-03-03 PROCEDURE — 99214 PR OFFICE/OUTPT VISIT, EST, LEVL IV, 30-39 MIN: ICD-10-PCS | Mod: S$PBB,,, | Performed by: PSYCHIATRY & NEUROLOGY

## 2020-03-03 PROCEDURE — 99999 PR PBB SHADOW E&M-EST. PATIENT-LVL III: ICD-10-PCS | Mod: PBBFAC,,, | Performed by: PSYCHIATRY & NEUROLOGY

## 2020-03-03 PROCEDURE — 99999 PR PBB SHADOW E&M-EST. PATIENT-LVL III: CPT | Mod: PBBFAC,,, | Performed by: PSYCHIATRY & NEUROLOGY

## 2020-03-03 PROCEDURE — 99214 OFFICE O/P EST MOD 30 MIN: CPT | Mod: S$PBB,,, | Performed by: PSYCHIATRY & NEUROLOGY

## 2020-03-03 PROCEDURE — 99213 OFFICE O/P EST LOW 20 MIN: CPT | Mod: PBBFAC | Performed by: PSYCHIATRY & NEUROLOGY

## 2020-03-03 RX ORDER — DOXYCYCLINE 100 MG/1
CAPSULE ORAL
COMMUNITY
Start: 2019-12-06 | End: 2021-08-04

## 2020-03-03 RX ORDER — NAPROXEN SODIUM 220 MG
TABLET ORAL
COMMUNITY
Start: 2019-09-04 | End: 2021-07-14

## 2020-03-03 RX ORDER — NEOMYCIN SULFATE, POLYMYXIN B SULFATE, AND DEXAMETHASONE 3.5; 10000; 1 MG/G; [USP'U]/G; MG/G
OINTMENT OPHTHALMIC
COMMUNITY
Start: 2019-12-06 | End: 2022-12-22

## 2020-03-03 NOTE — PROGRESS NOTES
"Name: Onelia Duarte  MRN: [id number]   CSN: [id number]      Date: 03/03/2020    HISTORY OF PRESENT ILLNESS (HPI)  The patient is a 69 y.o. presents for follow up for epilepsy    Onset was at age 12 and she had single generalized convulsion prior to Erica in the setting of medication changes.   Per MILLER'Mara 2018:  "She has had a long history of partial complex seizures characterized by transient alteration in consciousness with amnesia and occasional repetitive movements affecting the hands and mouth. She had an MRI scan of the brain done in 1992 that was normal. An EEG done at that time showed evidence of right frontal sharp focus. She had been doing well with a combination of Keppra, Lamictal and clonazepam and has had no spells in over a year.  In the past her seizures have been brief and were usually triggered by stress at work.  She is now retired.  She does report that she has had occasional episodes when she feels she might have had a spells but this passes.  This has been very infrequent usually triggered by excessive exertion on stress.  She was last seen by me 2 years ago and denies any new medical problems otherwise.  She came to the clinic accompanied by her .  She is planned to her vacation to [place] next month."      Interim History  No further auras    10/2019  Continues with occasional episodes of transient staring for a few seconds followed by 1-2 minutes confusion, patient and  estimate this happens about 4/year with no escalation in frequency in recent years.  She has never driven.    ED visits  Episodes of SE  Change in meds    Seizure Seminology  Seizure Type 1  Classification:   Aura -   Ictus  - Nonconv -  - Conv -  - Duration -   Post-ictal  - Symptoms  - Duration  Age of onset   Current Seizure Frequency -    Last Seizure      sz per month 2019 2020   Kamran     Feb     Mar     Apr     May 1    Kj     Jul     Aug     Sep     Oct     Nov     Dec     Tot       Seizure " Triggers  Sleep Deprivation -  None  Other medications -  None   Benadral   Tramadol   Other  Psych/stress -  None  Photic stimulation -  None  Hyperventilation -  None  Medical Problems -  None  Menses -   No  Sensory Stimulation    Light  No   Sound  No   Problem Solv No   Other  No  Missed dose of Rx None    AED Treatments  Present regimen  lamotrigine (Lamictal, LTG) 200mg bid  levetiracetam (Keppra, LEV) 750mg qid  clonazepam (Klonopin, CZP) 1/2/1/2    Prior treatments  phenytoin (Dilantin, PHT)  primidone (Mysoline, PRM)  Not tried  acetazolamide (Diamox, AZM)  Amantadine  brivitiracetam (Briviact, BRV)  carbamazepine (Tegretol, CBZ)  clobazam (Onfi or Frizium, CLB)  ethosuximide (Zarontin, ESM)  eslicarbazine (Aptiom, ESL)  felbamate (felbatol, FBM)  gabapentin (Neurontin, GPN)  lacosamide (Vimpat, LCS)   methsuximide (Celontin, MSM)  oxcarbazepine (Trileptal OXC)  perampanel (Fycompa, FCP)   phenobarbital (Pb)  pregabalin (Lyrica, PGB)  rufinamide (Banzel, RUF)  tiagabine (Gabatril,  TGB)  topiramate (Topamax, TPM)  viagabatrin, (Sabril, VGB)  vagal nerve stimulator (VNS)  valproic acid (Depakote, VPA)  zonisamide (Zonegran, ZNA)  Benzodiazepines  diazepam - rectal (Diastatl)  diazepam - oral (Valium, DZ)  clorazepate (Tranxene, CLZ)  Ativan  Brain Stimulation  Vagal Nerve Stimulation-n/a  DBS- n/a    Adherence/Compliance method  Memory - yes  Mom or Spouse - Yes  Pill Box - no  Bird calendar - no  Turn over medication bottle - no  Phone alarm - no    Seizure Evaluation  EEG Routine -   EEG Ambulatory -   EEG\Video Monitoring -   MRI/MRA -   CT/CTA Scan -   PET Scan -   Neuropsychological evaluation -   DEXA Scan    Potential Epilepsy Risk Factors:   Pregnancy/Labor/Delivery - full term uncomplicated pregnancy labor and vaginal delivery  Febrile seizures - none  Head injury  - none  CNS infection - none     Stroke - none  Family Hx of Sz - none    PAST MEDICAL HISTORY:   Active Ambulatory Problems      Diagnosis Date Noted    Partial epilepsy with impairment of consciousness 07/29/2012    Osteoporosis      Resolved Ambulatory Problems     Diagnosis Date Noted    No Resolved Ambulatory Problems     Past Medical History:   Diagnosis Date    Seizures         PAST SURGICAL HISTORY:   Past Surgical History:   Procedure Laterality Date    HYSTERECTOMY          FAMILY HISTORY: No family history on file.      SOCIAL HISTORY:   Social History     Socioeconomic History    Marital status:      Spouse name: Not on file    Number of children: Not on file    Years of education: Not on file    Highest education level: Not on file   Occupational History    Not on file   Social Needs    Financial resource strain: Not on file    Food insecurity:     Worry: Not on file     Inability: Not on file    Transportation needs:     Medical: Not on file     Non-medical: Not on file   Tobacco Use    Smoking status: Never Smoker    Smokeless tobacco: Never Used   Substance and Sexual Activity    Alcohol use: No    Drug use: No    Sexual activity: Not on file   Lifestyle    Physical activity:     Days per week: Not on file     Minutes per session: Not on file    Stress: Not on file   Relationships    Social connections:     Talks on phone: Not on file     Gets together: Not on file     Attends Christianity service: Not on file     Active member of club or organization: Not on file     Attends meetings of clubs or organizations: Not on file     Relationship status: Not on file   Other Topics Concern    Not on file   Social History Narrative    Not on file        SUBSTANCE USE:  Social History     Tobacco Use    Smoking status: Never Smoker    Smokeless tobacco: Never Used   Substance and Sexual Activity    Alcohol use: No    Drug use: No    Sexual activity: Not on file      Social History     Tobacco Use    Smoking status: Never Smoker    Smokeless tobacco: Never Used   Substance Use Topics    Alcohol use: No  "       ALLERGIES: Codeine and Oxycodone-acetaminophen     There were no vitals taken for this visit.    Higher Cortical Function:    Patient is a well developed, pleasant, well groomed individual appearing their stated age  Oriented - intact to person, place and time and followed two step instruction correctly.    Fund of knowledge was appropriate.    R-L Orientation - Intact  Language - Speech was fluent without evidence for an aphasia.    Cranial Nerves II - XII:    EOMs were intact with normal smooth and no nystagmus.    PERRLA. D/C  Visual fields were full to confrontation.    Motor - facial movement was symmetrical and normal.    Facial sensory - Light touch and pin prick sensations were normal.    Hearing was normal to finger rub.  Palate moved well and was symmetrical with normal palatal and oral sensation.    Tongue movement was full & the patient could say "la la la" and "Ka Ka Ka" without  difficulty. Normal power and bulk was found in the massiter and rotator muscles of the neck.  Motor: Power, bulk and tone were normal in all extremities.  Sensory: Light touch, pin prick, vibration and position senses were normal in all extremities.    Coordination:       Rapid alternating movements and rapid finger tapping - normal.       Finger to nose - nl.       Arm roll - symmetrical.    Gait:  Station, gait and tandem walking were done without difficulty and Romberg was negative.    Deep tendon reflexes:    Reflex L R   Bicpets 2+ 2+   Tricepts 2+ 2+   Brachio-radialis 2+ 2+   Knee 2+ 2+   Ankle 2+ 2+   Babinski No No     Tremor: resting, postural, intentional - none    Pulses    Carotids - strong without bruits    Peripheral - strong and symmetrical      IMPRESSION  1. Likely CLRE    DISPOSITION:   Continue current regiment, patient asked to keep log of events    Return 12 months      "

## 2020-04-28 ENCOUNTER — PATIENT MESSAGE (OUTPATIENT)
Dept: NEUROLOGY | Facility: CLINIC | Age: 70
End: 2020-04-28

## 2020-04-29 RX ORDER — CLONAZEPAM 1 MG/1
1 TABLET ORAL 2 TIMES DAILY
Qty: 60 TABLET | Refills: 5 | Status: SHIPPED | OUTPATIENT
Start: 2020-04-29 | End: 2020-10-21

## 2020-04-29 RX ORDER — CLONAZEPAM 1 MG/1
TABLET ORAL
COMMUNITY
Start: 2020-03-23 | End: 2020-04-29 | Stop reason: SDUPTHER

## 2020-05-14 RX ORDER — LEVOTHYROXINE SODIUM 50 UG/1
50 TABLET ORAL DAILY
Qty: 30 TABLET | Refills: 0 | Status: SHIPPED | OUTPATIENT
Start: 2020-05-14 | End: 2020-06-08

## 2020-05-14 NOTE — TELEPHONE ENCOUNTER
Spoke to Kia at [facility] canceled Rx, they are contacting pt to get updated doctor

## 2020-05-14 NOTE — TELEPHONE ENCOUNTER
----- Message from Aletha Zafar sent at 5/14/2020  8:59 AM CDT -----  Contact: Patient [phone]  Prescription Request:     Name of medication: levothyroxine (SYNTHROID) 50 MCG tablet    Reason for request: Refill    Pharmacy: [facility]/pharmacy #[id number] - [place], [place] - [address]    Please advise.    Thank You

## 2020-05-15 ENCOUNTER — TELEPHONE (OUTPATIENT)
Dept: FAMILY MEDICINE | Facility: CLINIC | Age: 70
End: 2020-05-15

## 2020-05-15 NOTE — TELEPHONE ENCOUNTER
----- Message from Nery Escamilla sent at 5/15/2020  9:58 AM CDT -----  Contact: Self  [phone]  Patient came from [facility] and she want to know if you have received all of her records from [facility]..

## 2020-05-15 NOTE — TELEPHONE ENCOUNTER
----- Message from Steffen Dodd sent at 5/15/2020 10:09 AM CDT -----  Contact: Patient [phone]  RX request - refill or new RX.  Is this a refill or new RX:  Refill  RX name and strength: levothyroxine (SYNTHROID) 50 MCG tablet  Directions:   Is this a 30 day or 90 day RX:    Pharmacy name and phone #[facility]/pharmacy #[id number] - [place] - [address] [phone] (Phone) [phone] (Fax)    Comments:  Called pharmacy stating does not have the request for refill would like for office to resend, stating may have had error in system, call patient to inform has been resent.    Please call an advise  Thank you

## 2020-05-15 NOTE — LETTER
AUTHORIZATION FOR RELEASE OF   CONFIDENTIAL INFORMATION    Dear Medical Records ,    We are seeing Onelia Duarte, date of birth 1950, in the clinic at No PCP found. Jostin Downing MD.  is the patient's PCP. Onelia Duarte has an outstanding lab/procedure at the time we reviewed her chart. In order to help keep her health information updated, she has authorized us to request the following medical record(s):        (  )  MAMMOGRAM                                      (  )  COLONOSCOPY      (  )  PAP SMEAR                                          (  )  OUTSIDE LAB RESULTS     (  )  DEXA SCAN                                          (  )  EYE EXAM            (  )  FOOT EXAM                                          ( x )  ENTIRE RECORD     (  )  OUTSIDE IMMUNIZATIONS                 ( x )  2016-July 15, 2019         EJIM 1       Please fax records to [facility], David Klibert MD, [phone]     If you have any questions, please contact [facility] at [phone].           Patient Name: Onelia Duarte  : 1950  Patient Phone #: [phone]

## 2020-05-15 NOTE — TELEPHONE ENCOUNTER
Advised patient we did not received her records from [facility] send request. Patient made an appt to [facility] care with Dr. Downing on 6/17 at 10:00. Spoke to pharmacy about refill of synthroid.

## 2020-06-01 ENCOUNTER — TELEPHONE (OUTPATIENT)
Dept: ADMINISTRATIVE | Facility: HOSPITAL | Age: 70
End: 2020-06-01

## 2020-06-03 ENCOUNTER — PATIENT OUTREACH (OUTPATIENT)
Dept: ADMINISTRATIVE | Facility: HOSPITAL | Age: 70
End: 2020-06-03

## 2020-06-03 ENCOUNTER — TELEPHONE (OUTPATIENT)
Dept: ADMINISTRATIVE | Facility: HOSPITAL | Age: 70
End: 2020-06-03

## 2020-06-05 ENCOUNTER — PATIENT OUTREACH (OUTPATIENT)
Dept: ADMINISTRATIVE | Facility: HOSPITAL | Age: 70
End: 2020-06-05

## 2020-06-05 ENCOUNTER — TELEPHONE (OUTPATIENT)
Dept: ADMINISTRATIVE | Facility: HOSPITAL | Age: 70
End: 2020-06-05

## 2020-06-17 ENCOUNTER — OFFICE VISIT (OUTPATIENT)
Dept: FAMILY MEDICINE | Facility: CLINIC | Age: 70
End: 2020-06-17
Payer: MEDICARE

## 2020-06-17 VITALS
HEART RATE: 72 BPM | WEIGHT: 131.94 LBS | SYSTOLIC BLOOD PRESSURE: 120 MMHG | BODY MASS INDEX: 21.98 KG/M2 | HEIGHT: 65 IN | OXYGEN SATURATION: 98 % | DIASTOLIC BLOOD PRESSURE: 80 MMHG | TEMPERATURE: 98 F

## 2020-06-17 DIAGNOSIS — E03.9 HYPOTHYROIDISM (ACQUIRED): ICD-10-CM

## 2020-06-17 DIAGNOSIS — G40.209 PARTIAL EPILEPSY WITH IMPAIRMENT OF CONSCIOUSNESS: Primary | ICD-10-CM

## 2020-06-17 DIAGNOSIS — M81.0 OSTEOPOROSIS, UNSPECIFIED OSTEOPOROSIS TYPE, UNSPECIFIED PATHOLOGICAL FRACTURE PRESENCE: ICD-10-CM

## 2020-06-17 DIAGNOSIS — E78.2 HYPERLIPIDEMIA, MIXED: ICD-10-CM

## 2020-06-17 PROCEDURE — 99214 PR OFFICE/OUTPT VISIT, EST, LEVL IV, 30-39 MIN: ICD-10-PCS | Mod: S$PBB,,, | Performed by: INTERNAL MEDICINE

## 2020-06-17 PROCEDURE — 99214 OFFICE O/P EST MOD 30 MIN: CPT | Mod: S$PBB,,, | Performed by: INTERNAL MEDICINE

## 2020-06-17 PROCEDURE — 99214 OFFICE O/P EST MOD 30 MIN: CPT | Mod: PBBFAC,PN | Performed by: INTERNAL MEDICINE

## 2020-06-17 PROCEDURE — 99999 PR PBB SHADOW E&M-EST. PATIENT-LVL IV: CPT | Mod: PBBFAC,,, | Performed by: INTERNAL MEDICINE

## 2020-06-17 PROCEDURE — 99999 PR PBB SHADOW E&M-EST. PATIENT-LVL IV: ICD-10-PCS | Mod: PBBFAC,,, | Performed by: INTERNAL MEDICINE

## 2020-06-17 RX ORDER — CONJUGATED ESTROGENS 0.62 MG/G
CREAM VAGINAL
COMMUNITY
Start: 2020-06-16 | End: 2021-08-04

## 2020-06-17 RX ORDER — DENOSUMAB 60 MG/ML
INJECTION SUBCUTANEOUS
COMMUNITY
Start: 2020-06-16

## 2020-06-17 NOTE — PROGRESS NOTES
[facility] Note    Chief Complaint      Chief Complaint   Patient presents with    Establish Care       History of Present Illness      Onelia Duarte is a 70 y.o. female with chronic conditions of Hypothyroidism, epilepsy, osteoporosis who presents today for: re-establish care from  and review chronic conditions.   Hypothyroidism: Controlled on synthroid.  TSH due.    Epilepsy: Controlled on lamotrigine, keppra, clonazepam.  Sees Dr. Thomas. No recent seizure.  Osteoporosis: Controlled on prolia.  DEXA UTD.  Flu shot UTD.  Pneumovax UTD.  Mammogram 5/2020, Dr. Sheets.  DEXA 3/2020, Dr. Sheets.  Cscope 2019, Dr. Akhtar, no polyps, 5 yr interval for previous hx of polyps.     Past Medical History:  Past Medical History:   Diagnosis Date    Osteoporosis     Seizures        Past Surgical History:   has a past surgical history that includes Hysterectomy; Colonoscopy (07/24/2019); and Knee surgery (Right, 2019).    Family History:  family history is not on file.     Social History:  Social History     Tobacco Use    Smoking status: Never Smoker    Smokeless tobacco: Never Used   Substance Use Topics    Alcohol use: No    Drug use: No       Review of Systems   Constitutional: Negative for chills, fever and malaise/fatigue.   Respiratory: Negative for shortness of breath.    Cardiovascular: Negative for chest pain.   Gastrointestinal: Negative for constipation, diarrhea, nausea and vomiting.   Skin: Negative for rash.   Neurological: Negative for weakness.        Medications:  Outpatient Encounter Medications as of 6/17/2020   Medication Sig Dispense Refill    AA/prot/lysine/methio/vit C/B6 (A/G PRO ORAL)       ascorbic acid (VITAMIN C) 1000 MG tablet Take 1,000 mg by mouth once daily.      calcium citrate-vitamin D3 315-200 mg (CITRACAL+D) 315-200 mg-unit per tablet Take 1 tablet by mouth 2 (two) times daily.      cholecalciferol, vitamin D3, 1,000 unit capsule Take 1,000  Units by mouth once daily.      clonazePAM (KLONOPIN) 1 MG tablet Take 1 tablet (1 mg total) by mouth 2 (two) times daily. 60 tablet 5    clonazePAM (KLONOPIN) 2 MG Tab Take 1 tablet (2 mg total) by mouth 2 (two) times daily. 60 tablet 5    cyanocobalamin (VITAMIN B-12) 1000 MCG tablet Take 100 mcg by mouth once daily.      lamoTRIgine (LAMICTAL) 200 MG tablet TAKE 1 TABLET BY MOUTH TWICE A  tablet 3    levETIRAcetam (KEPPRA) 750 MG Tab TAKE 1 TABLET (750 MG TOTAL) BY MOUTH 4 (FOUR) TIMES DAILY. 360 tablet 3    levothyroxine (SYNTHROID) 50 MCG tablet TAKE 1 TABLET BY MOUTH EVERY DAY 90 tablet 0    PREMARIN vaginal cream       PROLIA 60 mg/mL Syrg       doxycycline (VIBRAMYCIN) 100 MG Cap       influenza (FLUZONE HIGH-DOSE 2018-19, PF,) 180 mcg/0.5 mL vaccine Inject into the muscle. 0.5 mL 0    naproxen sodium (ANAPROX) 220 MG tablet Take by mouth.      neomycin-polymyxin-dexamethasone (DEXACINE) 3.5 mg/g-10,000 unit/g-0.1 % Oint       RESTASIS 0.05 % ophthalmic emulsion Place 1 drop into both eyes 2 (two) times daily.  6     No facility-administered encounter medications on file as of 6/17/2020.        Allergies:  Review of patient's allergies indicates:   Allergen Reactions    Codeine Itching    Doxycycline hyclate     Oxycodone-acetaminophen        Health Maintenance:  Immunization History   Administered Date(s) Administered    Influenza - High Dose - PF (65 years and older) 08/21/2018    Pneumococcal Polysaccharide - 23 Valent 12/06/2009      Health Maintenance   Topic Date Due    Hepatitis C Screening  1950    TETANUS VACCINE  04/18/1970    Pneumococcal Vaccine (65+ Low/Medium Risk) (1 of 2 - PCV13) 04/18/2017    Mammogram  03/16/2022    DEXA SCAN  05/18/2023    Lipid Panel  08/01/2023        Physical Exam      Vital Signs  Temp: 97.5 °F (36.4 °C)  Temp src: Oral  Pulse: 72  SpO2: 98 %  BP: 120/80  BP Location: Right arm  Patient Position: Sitting  Pain Score: 0-No  "pain  Height and Weight  Height: 5' 5" (165.1 cm)  Weight: 59.8 kg (131 lb 15.1 oz)  BSA (Calculated - sq m): 1.66 sq meters  BMI (Calculated): 22  Weight in (lb) to have BMI = 25: 149.9]    Physical Exam  Vitals signs reviewed.   Constitutional:       Appearance: She is well-developed.   HENT:      Head: Normocephalic and atraumatic.      Right Ear: External ear normal.      Left Ear: External ear normal.   Eyes:      Conjunctiva/sclera: Conjunctivae normal.      Pupils: Pupils are equal, round, and reactive to light.   Neck:      Vascular: No carotid bruit.   Cardiovascular:      Rate and Rhythm: Normal rate and regular rhythm.      Heart sounds: Normal heart sounds. No murmur.   Pulmonary:      Effort: Pulmonary effort is normal.      Breath sounds: Normal breath sounds. No wheezing or rales.   Abdominal:      General: Bowel sounds are normal. There is no distension.      Palpations: Abdomen is soft.      Tenderness: There is no abdominal tenderness.          Laboratory:  CBC:      CMP:  Recent Labs   Lab 10/01/19  1410   Glucose 106   Calcium 10.1   Albumin 4.5   Total Protein 8.2   Sodium 141   Potassium 5.1   CO2 32 H   Chloride 101   BUN, Bld 15   Alkaline Phosphatase 112   ALT 15   AST 22   Total Bilirubin 0.5     URINALYSIS:       LIPIDS:  Recent Labs   Lab 08/01/18   HDL 80   Cholesterol 188   LDL Cholesterol 96     TSH:      A1C:        Assessment/Plan     Onelia Duarte is a 70 y.o.female with:    1. Hypothyroidism (acquired)  - Comprehensive metabolic panel; Future  - TSH; Future  - T4, free; Future  - CBC auto differential; Future  - Comprehensive metabolic panel  - TSH  - T4, free  - CBC auto differential  Continue current meds.    2. Partial epilepsy with impairment of consciousness  - CBC auto differential; Future  - CBC auto differential  Continue current meds.  F/u with DR. Thomas.  3. Osteoporosis, unspecified osteoporosis type, unspecified pathological fracture presence  Continue " current meds.  F/U with Dr. Sheets.  4. Hyperlipidemia, mixed  - Comprehensive metabolic panel; Future  - Lipid Panel; Future  - CBC auto differential; Future  - Comprehensive metabolic panel  - Lipid Panel  - CBC auto differential       Chronic conditions status updated as per HPI.  Other than changes above, cont current medications and maintain follow up with specialists.  Return to clinic in 12 months.    Jostin Downing MD  [facility]

## 2020-06-30 ENCOUNTER — LAB VISIT (OUTPATIENT)
Dept: PRIMARY CARE CLINIC | Facility: OTHER | Age: 70
End: 2020-06-30
Attending: INTERNAL MEDICINE
Payer: MEDICARE

## 2020-06-30 DIAGNOSIS — Z03.818 ENCOUNTER FOR OBSERVATION FOR SUSPECTED EXPOSURE TO OTHER BIOLOGICAL AGENTS RULED OUT: ICD-10-CM

## 2020-06-30 PROCEDURE — U0003 INFECTIOUS AGENT DETECTION BY NUCLEIC ACID (DNA OR RNA); SEVERE ACUTE RESPIRATORY SYNDROME CORONAVIRUS 2 (SARS-COV-2) (CORONAVIRUS DISEASE [COVID-19]), AMPLIFIED PROBE TECHNIQUE, MAKING USE OF HIGH THROUGHPUT TECHNOLOGIES AS DESCRIBED BY CMS-2020-01-R: HCPCS

## 2020-07-03 LAB — SARS-COV-2 RNA RESP QL NAA+PROBE: NEGATIVE

## 2020-07-07 DIAGNOSIS — G40.209 COMPLEX PARTIAL SEIZURES WITH CONSCIOUSNESS IMPAIRED: ICD-10-CM

## 2020-07-07 DIAGNOSIS — G40.209 PARTIAL EPILEPSY WITH IMPAIRMENT OF CONSCIOUSNESS: ICD-10-CM

## 2020-07-07 RX ORDER — CLONAZEPAM 2 MG/1
2 TABLET ORAL 2 TIMES DAILY
Qty: 60 TABLET | Refills: 5 | Status: SHIPPED | OUTPATIENT
Start: 2020-07-07 | End: 2021-01-05

## 2020-07-07 RX ORDER — LAMOTRIGINE 200 MG/1
200 TABLET ORAL 2 TIMES DAILY
Qty: 180 TABLET | Refills: 3 | Status: SHIPPED | OUTPATIENT
Start: 2020-07-07 | End: 2021-04-19

## 2020-08-07 RX ORDER — LEVOTHYROXINE SODIUM 50 UG/1
TABLET ORAL
Qty: 90 TABLET | Refills: 3 | Status: SHIPPED | OUTPATIENT
Start: 2020-08-07 | End: 2021-08-17

## 2020-10-07 ENCOUNTER — PATIENT MESSAGE (OUTPATIENT)
Dept: FAMILY MEDICINE | Facility: CLINIC | Age: 70
End: 2020-10-07

## 2021-02-10 ENCOUNTER — PATIENT MESSAGE (OUTPATIENT)
Dept: FAMILY MEDICINE | Facility: CLINIC | Age: 71
End: 2021-02-10

## 2021-02-26 ENCOUNTER — IMMUNIZATION (OUTPATIENT)
Dept: INTERNAL MEDICINE | Facility: CLINIC | Age: 71
End: 2021-02-26
Payer: MEDICARE

## 2021-02-26 DIAGNOSIS — Z23 NEED FOR VACCINATION: Primary | ICD-10-CM

## 2021-02-26 PROCEDURE — 91300 COVID-19, MRNA, LNP-S, PF, 30 MCG/0.3 ML DOSE VACCINE: CPT | Mod: PBBFAC | Performed by: INTERNAL MEDICINE

## 2021-03-19 ENCOUNTER — IMMUNIZATION (OUTPATIENT)
Dept: INTERNAL MEDICINE | Facility: CLINIC | Age: 71
End: 2021-03-19
Payer: MEDICARE

## 2021-03-19 DIAGNOSIS — Z23 NEED FOR VACCINATION: Primary | ICD-10-CM

## 2021-03-19 PROCEDURE — 91300 COVID-19, MRNA, LNP-S, PF, 30 MCG/0.3 ML DOSE VACCINE: CPT | Mod: PBBFAC

## 2021-03-19 PROCEDURE — 0002A COVID-19, MRNA, LNP-S, PF, 30 MCG/0.3 ML DOSE VACCINE: CPT | Mod: PBBFAC

## 2021-07-07 ENCOUNTER — PATIENT MESSAGE (OUTPATIENT)
Dept: ADMINISTRATIVE | Facility: HOSPITAL | Age: 71
End: 2021-07-07

## 2021-07-09 ENCOUNTER — TELEPHONE (OUTPATIENT)
Dept: NEUROLOGY | Facility: CLINIC | Age: 71
End: 2021-07-09

## 2021-07-13 ENCOUNTER — TELEPHONE (OUTPATIENT)
Dept: NEUROLOGY | Facility: CLINIC | Age: 71
End: 2021-07-13

## 2021-07-14 ENCOUNTER — TELEPHONE (OUTPATIENT)
Dept: NEUROLOGY | Facility: CLINIC | Age: 71
End: 2021-07-14

## 2021-07-14 ENCOUNTER — OFFICE VISIT (OUTPATIENT)
Dept: NEUROLOGY | Facility: CLINIC | Age: 71
End: 2021-07-14
Payer: MEDICARE

## 2021-07-14 ENCOUNTER — LAB VISIT (OUTPATIENT)
Dept: LAB | Facility: HOSPITAL | Age: 71
End: 2021-07-14
Attending: PSYCHIATRY & NEUROLOGY
Payer: MEDICARE

## 2021-07-14 VITALS
WEIGHT: 123.56 LBS | SYSTOLIC BLOOD PRESSURE: 126 MMHG | HEIGHT: 65 IN | DIASTOLIC BLOOD PRESSURE: 79 MMHG | BODY MASS INDEX: 20.59 KG/M2

## 2021-07-14 DIAGNOSIS — G40.209 COMPLEX PARTIAL SEIZURES WITH CONSCIOUSNESS IMPAIRED: ICD-10-CM

## 2021-07-14 DIAGNOSIS — G40.209 PARTIAL EPILEPSY WITH IMPAIRMENT OF CONSCIOUSNESS: Primary | ICD-10-CM

## 2021-07-14 LAB
ALBUMIN SERPL BCP-MCNC: 4.2 G/DL (ref 3.5–5.2)
ALP SERPL-CCNC: 97 U/L (ref 55–135)
ALT SERPL W/O P-5'-P-CCNC: 16 U/L (ref 10–44)
ANION GAP SERPL CALC-SCNC: 8 MMOL/L (ref 8–16)
AST SERPL-CCNC: 23 U/L (ref 10–40)
BASOPHILS # BLD AUTO: 0.04 K/UL (ref 0–0.2)
BASOPHILS NFR BLD: 0.6 % (ref 0–1.9)
BILIRUB SERPL-MCNC: 0.8 MG/DL (ref 0.1–1)
BUN SERPL-MCNC: 11 MG/DL (ref 8–23)
CALCIUM SERPL-MCNC: 10.8 MG/DL (ref 8.7–10.5)
CHLORIDE SERPL-SCNC: 100 MMOL/L (ref 95–110)
CO2 SERPL-SCNC: 31 MMOL/L (ref 23–29)
CREAT SERPL-MCNC: 0.8 MG/DL (ref 0.5–1.4)
DIFFERENTIAL METHOD: ABNORMAL
EOSINOPHIL # BLD AUTO: 0.1 K/UL (ref 0–0.5)
EOSINOPHIL NFR BLD: 1.1 % (ref 0–8)
ERYTHROCYTE [DISTWIDTH] IN BLOOD BY AUTOMATED COUNT: 12.2 % (ref 11.5–14.5)
EST. GFR  (AFRICAN AMERICAN): >60 ML/MIN/1.73 M^2
EST. GFR  (NON AFRICAN AMERICAN): >60 ML/MIN/1.73 M^2
GLUCOSE SERPL-MCNC: 88 MG/DL (ref 70–110)
HCT VFR BLD AUTO: 33.7 % (ref 37–48.5)
HGB BLD-MCNC: 10.8 G/DL (ref 12–16)
IMM GRANULOCYTES # BLD AUTO: 0.02 K/UL (ref 0–0.04)
IMM GRANULOCYTES NFR BLD AUTO: 0.3 % (ref 0–0.5)
LYMPHOCYTES # BLD AUTO: 3.2 K/UL (ref 1–4.8)
LYMPHOCYTES NFR BLD: 45.6 % (ref 18–48)
MCH RBC QN AUTO: 31.8 PG (ref 27–31)
MCHC RBC AUTO-ENTMCNC: 32 G/DL (ref 32–36)
MCV RBC AUTO: 99 FL (ref 82–98)
MONOCYTES # BLD AUTO: 0.5 K/UL (ref 0.3–1)
MONOCYTES NFR BLD: 7.3 % (ref 4–15)
NEUTROPHILS # BLD AUTO: 3.2 K/UL (ref 1.8–7.7)
NEUTROPHILS NFR BLD: 45.1 % (ref 38–73)
NRBC BLD-RTO: 0 /100 WBC
PLATELET # BLD AUTO: 262 K/UL (ref 150–450)
PMV BLD AUTO: 11.5 FL (ref 9.2–12.9)
POTASSIUM SERPL-SCNC: 4.4 MMOL/L (ref 3.5–5.1)
PROT SERPL-MCNC: 7.5 G/DL (ref 6–8.4)
RBC # BLD AUTO: 3.4 M/UL (ref 4–5.4)
SODIUM SERPL-SCNC: 139 MMOL/L (ref 136–145)
WBC # BLD AUTO: 7.09 K/UL (ref 3.9–12.7)

## 2021-07-14 PROCEDURE — 80175 DRUG SCREEN QUAN LAMOTRIGINE: CPT | Performed by: PSYCHIATRY & NEUROLOGY

## 2021-07-14 PROCEDURE — 99213 OFFICE O/P EST LOW 20 MIN: CPT | Mod: PBBFAC | Performed by: PSYCHIATRY & NEUROLOGY

## 2021-07-14 PROCEDURE — 99214 PR OFFICE/OUTPT VISIT, EST, LEVL IV, 30-39 MIN: ICD-10-PCS | Mod: S$PBB,,, | Performed by: PSYCHIATRY & NEUROLOGY

## 2021-07-14 PROCEDURE — 80053 COMPREHEN METABOLIC PANEL: CPT | Performed by: PSYCHIATRY & NEUROLOGY

## 2021-07-14 PROCEDURE — 36415 COLL VENOUS BLD VENIPUNCTURE: CPT | Performed by: PSYCHIATRY & NEUROLOGY

## 2021-07-14 PROCEDURE — 99999 PR PBB SHADOW E&M-EST. PATIENT-LVL III: CPT | Mod: PBBFAC,,, | Performed by: PSYCHIATRY & NEUROLOGY

## 2021-07-14 PROCEDURE — 99214 OFFICE O/P EST MOD 30 MIN: CPT | Mod: S$PBB,,, | Performed by: PSYCHIATRY & NEUROLOGY

## 2021-07-14 PROCEDURE — 80177 DRUG SCRN QUAN LEVETIRACETAM: CPT | Performed by: PSYCHIATRY & NEUROLOGY

## 2021-07-14 PROCEDURE — 99999 PR PBB SHADOW E&M-EST. PATIENT-LVL III: ICD-10-PCS | Mod: PBBFAC,,, | Performed by: PSYCHIATRY & NEUROLOGY

## 2021-07-14 PROCEDURE — 85025 COMPLETE CBC W/AUTO DIFF WBC: CPT | Performed by: PSYCHIATRY & NEUROLOGY

## 2021-07-14 RX ORDER — LEVETIRACETAM 750 MG/1
750 TABLET ORAL 4 TIMES DAILY
Qty: 360 TABLET | Refills: 3 | Status: SHIPPED | OUTPATIENT
Start: 2021-07-14 | End: 2021-08-17

## 2021-07-14 RX ORDER — CLONAZEPAM 2 MG/1
2 TABLET ORAL 2 TIMES DAILY
Qty: 60 TABLET | Refills: 5 | Status: SHIPPED | OUTPATIENT
Start: 2021-07-14 | End: 2021-08-04 | Stop reason: SDUPTHER

## 2021-07-14 RX ORDER — LAMOTRIGINE 200 MG/1
200 TABLET ORAL 2 TIMES DAILY
Qty: 180 TABLET | Refills: 3 | Status: SHIPPED | OUTPATIENT
Start: 2021-07-14 | End: 2022-06-15

## 2021-07-14 NOTE — TELEPHONE ENCOUNTER
----- Message from Wally Bernard sent at 7/14/2021  3:58 PM CDT -----  Regarding: Pt states that she was supposed to have her Rx-clonazePAM (KLONOPIN) 2 MG tablet... (not 1 MG)    The Pt states that the wrong Rx called in and would like for the Rx - clonazePAM (KLONOPIN) 2 MG tablet to:    Pharmacy-[facility]/pharmacy #[id number] - [place] - [address]   Phone: [phone]  Fax:  [phone]    The very anxious Pt states that she got the last pill that fell under her refrigerator out and took it and is completely out now.

## 2021-07-17 LAB
LAMOTRIGINE SERPL-MCNC: 14.4 UG/ML (ref 2–15)
LEVETIRACETAM SERPL-MCNC: 49.3 UG/ML (ref 3–60)

## 2021-08-04 ENCOUNTER — PATIENT MESSAGE (OUTPATIENT)
Dept: NEUROLOGY | Facility: CLINIC | Age: 71
End: 2021-08-04

## 2021-08-04 DIAGNOSIS — G40.209 COMPLEX PARTIAL SEIZURES WITH CONSCIOUSNESS IMPAIRED: ICD-10-CM

## 2021-08-05 RX ORDER — CLONAZEPAM 2 MG/1
2 TABLET ORAL 2 TIMES DAILY
Qty: 60 TABLET | Refills: 5 | Status: SHIPPED | OUTPATIENT
Start: 2021-08-05 | End: 2022-01-12 | Stop reason: SDUPTHER

## 2021-08-16 DIAGNOSIS — E03.9 HYPOTHYROIDISM (ACQUIRED): Primary | ICD-10-CM

## 2021-08-17 ENCOUNTER — PATIENT MESSAGE (OUTPATIENT)
Dept: FAMILY MEDICINE | Facility: CLINIC | Age: 71
End: 2021-08-17

## 2021-08-17 RX ORDER — LEVOTHYROXINE SODIUM 50 UG/1
TABLET ORAL
Qty: 90 TABLET | Refills: 0 | Status: SHIPPED | OUTPATIENT
Start: 2021-08-17 | End: 2021-11-09

## 2021-08-18 ENCOUNTER — PATIENT MESSAGE (OUTPATIENT)
Dept: FAMILY MEDICINE | Facility: CLINIC | Age: 71
End: 2021-08-18

## 2021-08-19 ENCOUNTER — PATIENT MESSAGE (OUTPATIENT)
Dept: FAMILY MEDICINE | Facility: CLINIC | Age: 71
End: 2021-08-19

## 2021-08-29 ENCOUNTER — PATIENT OUTREACH (OUTPATIENT)
Dept: ADMINISTRATIVE | Facility: HOSPITAL | Age: 71
End: 2021-08-29

## 2021-08-29 ENCOUNTER — PATIENT MESSAGE (OUTPATIENT)
Dept: ADMINISTRATIVE | Facility: HOSPITAL | Age: 71
End: 2021-08-29

## 2021-09-03 ENCOUNTER — PATIENT MESSAGE (OUTPATIENT)
Dept: NEUROLOGY | Facility: CLINIC | Age: 71
End: 2021-09-03

## 2021-09-07 ENCOUNTER — TELEPHONE (OUTPATIENT)
Dept: ADMINISTRATIVE | Facility: HOSPITAL | Age: 71
End: 2021-09-07

## 2021-09-17 ENCOUNTER — TELEPHONE (OUTPATIENT)
Dept: NEUROLOGY | Facility: CLINIC | Age: 71
End: 2021-09-17

## 2021-09-17 ENCOUNTER — TELEPHONE (OUTPATIENT)
Dept: FAMILY MEDICINE | Facility: CLINIC | Age: 71
End: 2021-09-17

## 2021-10-06 ENCOUNTER — IMMUNIZATION (OUTPATIENT)
Dept: INTERNAL MEDICINE | Facility: CLINIC | Age: 71
End: 2021-10-06
Payer: MEDICARE

## 2021-10-06 DIAGNOSIS — Z23 NEED FOR VACCINATION: Primary | ICD-10-CM

## 2021-10-06 PROCEDURE — 91300 COVID-19, MRNA, LNP-S, PF, 30 MCG/0.3 ML DOSE VACCINE: CPT | Mod: PBBFAC

## 2021-10-06 PROCEDURE — 0003A COVID-19, MRNA, LNP-S, PF, 30 MCG/0.3 ML DOSE VACCINE: CPT | Mod: PBBFAC,CV19

## 2021-10-23 ENCOUNTER — PATIENT MESSAGE (OUTPATIENT)
Dept: ADMINISTRATIVE | Facility: OTHER | Age: 71
End: 2021-10-23
Payer: MEDICARE

## 2021-10-26 ENCOUNTER — OFFICE VISIT (OUTPATIENT)
Dept: FAMILY MEDICINE | Facility: CLINIC | Age: 71
End: 2021-10-26
Payer: MEDICARE

## 2021-10-26 VITALS
DIASTOLIC BLOOD PRESSURE: 72 MMHG | TEMPERATURE: 98 F | WEIGHT: 122.81 LBS | HEIGHT: 65 IN | OXYGEN SATURATION: 96 % | BODY MASS INDEX: 20.46 KG/M2 | SYSTOLIC BLOOD PRESSURE: 110 MMHG | HEART RATE: 95 BPM

## 2021-10-26 DIAGNOSIS — M81.0 OSTEOPOROSIS, UNSPECIFIED OSTEOPOROSIS TYPE, UNSPECIFIED PATHOLOGICAL FRACTURE PRESENCE: ICD-10-CM

## 2021-10-26 DIAGNOSIS — E78.2 HYPERLIPIDEMIA, MIXED: ICD-10-CM

## 2021-10-26 DIAGNOSIS — E03.9 HYPOTHYROIDISM (ACQUIRED): Primary | ICD-10-CM

## 2021-10-26 DIAGNOSIS — G40.209 PARTIAL EPILEPSY WITH IMPAIRMENT OF CONSCIOUSNESS: ICD-10-CM

## 2021-10-26 DIAGNOSIS — Z23 NEED FOR VACCINATION: ICD-10-CM

## 2021-10-26 PROCEDURE — 99999 PR PBB SHADOW E&M-EST. PATIENT-LVL IV: ICD-10-PCS | Mod: PBBFAC,,, | Performed by: INTERNAL MEDICINE

## 2021-10-26 PROCEDURE — 99214 PR OFFICE/OUTPT VISIT, EST, LEVL IV, 30-39 MIN: ICD-10-PCS | Mod: S$PBB,,, | Performed by: INTERNAL MEDICINE

## 2021-10-26 PROCEDURE — 99999 PR PBB SHADOW E&M-EST. PATIENT-LVL IV: CPT | Mod: PBBFAC,,, | Performed by: INTERNAL MEDICINE

## 2021-10-26 PROCEDURE — 99214 OFFICE O/P EST MOD 30 MIN: CPT | Mod: PBBFAC,PN | Performed by: INTERNAL MEDICINE

## 2021-10-26 PROCEDURE — 90694 VACC AIIV4 NO PRSRV 0.5ML IM: CPT | Mod: PBBFAC,PN

## 2021-10-26 PROCEDURE — 99214 OFFICE O/P EST MOD 30 MIN: CPT | Mod: S$PBB,,, | Performed by: INTERNAL MEDICINE

## 2021-10-26 PROCEDURE — G0008 ADMIN INFLUENZA VIRUS VAC: HCPCS | Mod: PBBFAC,PN

## 2021-10-27 LAB
ALBUMIN: 4.5 GRAM/DL (ref 3.5–5)
ALP SERPL-CCNC: 85 UNIT/L (ref 38–126)
ALT SERPL W P-5'-P-CCNC: 16 UNIT/L (ref 7–56)
ANION GAP SERPL CALC-SCNC: 15 MEQ/L (ref 9–18)
AST SERPL-CCNC: 20 UNIT/L (ref 7–40)
BASOPHILS ABSOLUTE COUNT: 0 K/UL (ref 0–0.2)
BASOPHILS NFR BLD: 0.4 % (ref 0–2)
BILIRUB SERPL-MCNC: 0.6 MG/DL (ref 0–1.2)
BUN BLD-MCNC: 15 MG/DL (ref 7–21)
BUN/CREAT SERPL: 19 RATIO (ref 6–22)
CALC OSMOLALITY: 278 MOSM/KG (ref 275–295)
CALCIUM SERPL-MCNC: 9.8 MG/DL (ref 8.5–10.3)
CHLORIDE SERPL-SCNC: 103 MEQ/L (ref 98–107)
CHOL/HDLC RATIO: 2
CHOLEST SERPL-MSCNC: 166 MG/DL (ref 100–200)
CO2 SERPL-SCNC: 27 MEQ/L (ref 21–31)
CREAT SERPL-MCNC: 0.8 MG/DL (ref 0.5–1)
DIFFERENTIAL TYPE: ABNORMAL
EOSINOPHIL NFR BLD: 2.6 % (ref 0–4)
EOSINOPHILS ABSOLUTE COUNT: 0.1 K/UL (ref 0–0.7)
ERYTHROCYTE [DISTWIDTH] IN BLOOD BY AUTOMATED COUNT: 12.5 % (ref 12–15.3)
FREE T4: 1.4 NANOGRAM/DL (ref 0.9–1.7)
GFR: 75.5 ML/MIN/1.73M2
GLUCOSE SERPL-MCNC: 57 MG/DL (ref 70–100)
HCT VFR BLD AUTO: 30.8 % (ref 37–47)
HDLC SERPL-MCNC: 83 MG/DL (ref 40–75)
HGB BLD-MCNC: 10.5 GRAM/DL (ref 12–16)
LDLC SERPL CALC-MCNC: 74 MG/DL (ref 0–125)
LYMPHOCYTES %: 27.9 % (ref 15–45)
LYMPHOCYTES ABSOLUTE COUNT: 1.6 K/UL (ref 1–4.2)
MCH RBC QN AUTO: 32.8 PICOGRAM (ref 27–33)
MCHC RBC AUTO-ENTMCNC: 34 GRAM/DL (ref 32–36)
MCV RBC AUTO: 96.4 FEMTOLITER (ref 81–99)
MONOCYTES %: 7.3 % (ref 3–13)
MONOCYTES ABSOLUTE COUNT: 0.4 K/UL (ref 0.1–0.8)
NEUTROPHILS ABSOLUTE COUNT: 3.6 K/UL (ref 2.1–7.6)
NEUTROPHILS RELATIVE PERCENT: 61.8 % (ref 32–80)
NONHDLC SERPL-MCNC: 83 MG/DL (ref 60–125)
PLATELET # BLD AUTO: 235 K/UL (ref 150–350)
PMV BLD AUTO: 11.7 FEMTOLITER (ref 7–10.2)
POTASSIUM SERPL-SCNC: 4.9 MEQ/L (ref 3.5–5)
RBC # BLD AUTO: 3.19 MIL/UL (ref 4.2–5.4)
SODIUM BLD-SCNC: 140 MEQ/L (ref 135–145)
TOTAL PROTEIN: 6.6 GRAM/DL (ref 6.3–8.2)
TRIGL SERPL-MCNC: 55 MG/DL (ref 30–150)
TSH SERPL DL<=0.005 MIU/L-ACNC: 1.92 UIL/ML (ref 0.35–4)
WBC # BLD AUTO: 5.8 K/UL (ref 4.5–11)

## 2021-10-28 ENCOUNTER — PATIENT MESSAGE (OUTPATIENT)
Dept: FAMILY MEDICINE | Facility: CLINIC | Age: 71
End: 2021-10-28
Payer: MEDICARE

## 2021-10-28 DIAGNOSIS — E53.8 B12 DEFICIENCY: ICD-10-CM

## 2021-10-28 DIAGNOSIS — D64.9 ANEMIA, UNSPECIFIED TYPE: Primary | ICD-10-CM

## 2021-10-28 DIAGNOSIS — E53.8 FOLATE DEFICIENCY: ICD-10-CM

## 2021-11-05 ENCOUNTER — PATIENT MESSAGE (OUTPATIENT)
Dept: FAMILY MEDICINE | Facility: CLINIC | Age: 71
End: 2021-11-05
Payer: MEDICARE

## 2021-11-08 ENCOUNTER — PATIENT MESSAGE (OUTPATIENT)
Dept: FAMILY MEDICINE | Facility: CLINIC | Age: 71
End: 2021-11-08
Payer: MEDICARE

## 2021-11-08 LAB
FERRITIN SERPL-MCNC: 140.7 NANOGRAM/ML (ref 13–150)
FOLIC ACID LEVEL: >20 NANOGRAM/ML (ref 4.2–19.9)
IRON SATN MFR SERPL: 34.3 % (ref 25–45)
TOTAL IRON BINDING CAPACITY: 115 MCG/DL (ref 37–170)
TOTAL IRON BINDING CAPACITY: 220 MCG/DL
TOTAL IRON BINDING CAPACITY: 335 MCG/DL (ref 250–450)
VITAMIN B12: >2000 PICOGRAM/ML (ref 232–1245)

## 2021-11-09 DIAGNOSIS — E03.9 HYPOTHYROIDISM (ACQUIRED): ICD-10-CM

## 2021-11-09 LAB
APPEARANCE UR: CLEAR
BILIRUB UR QL STRIP: NEGATIVE
COLOR UR: YELLOW
GLUCOSE UR QL STRIP: NEGATIVE
HGB UR QL STRIP: NEGATIVE
KETONES UR QL STRIP: NEGATIVE
LEUKOCYTE ESTERASE UR QL STRIP: ABNORMAL
NITRITE UR QL STRIP: NEGATIVE
PH UR STRIP: 6.5 [PH] (ref 5–8)
PROT UR QL STRIP: NEGATIVE
SP GR UR STRIP: 1.01 (ref 1–1.03)

## 2021-11-09 RX ORDER — LEVOTHYROXINE SODIUM 50 UG/1
TABLET ORAL
Qty: 90 TABLET | Refills: 3 | Status: SHIPPED | OUTPATIENT
Start: 2021-11-09 | End: 2022-12-05

## 2021-11-12 ENCOUNTER — PATIENT MESSAGE (OUTPATIENT)
Dept: PRIMARY CARE CLINIC | Facility: CLINIC | Age: 71
End: 2021-11-12
Payer: MEDICARE

## 2022-01-12 ENCOUNTER — PATIENT MESSAGE (OUTPATIENT)
Dept: NEUROLOGY | Facility: CLINIC | Age: 72
End: 2022-01-12
Payer: MEDICARE

## 2022-01-12 DIAGNOSIS — G40.209 COMPLEX PARTIAL SEIZURES WITH CONSCIOUSNESS IMPAIRED: ICD-10-CM

## 2022-01-12 RX ORDER — CLONAZEPAM 2 MG/1
2 TABLET ORAL 2 TIMES DAILY
Qty: 60 TABLET | Refills: 5 | Status: SHIPPED | OUTPATIENT
Start: 2022-01-12 | End: 2022-02-08

## 2022-01-12 RX ORDER — CLONAZEPAM 1 MG/1
1 TABLET ORAL 2 TIMES DAILY
Qty: 60 TABLET | Refills: 5 | Status: SHIPPED | OUTPATIENT
Start: 2022-01-12 | End: 2022-07-15

## 2022-02-14 ENCOUNTER — PATIENT OUTREACH (OUTPATIENT)
Dept: ADMINISTRATIVE | Facility: OTHER | Age: 72
End: 2022-02-14
Payer: MEDICARE

## 2022-02-14 NOTE — PROGRESS NOTES
Health Maintenance Due   Topic Date Due    Hepatitis C Screening  Never done    Shingles Vaccine (1 of 2) Never done     Updates were requested from care everywhere.  Chart was reviewed for overdue Proactive [facility] Encounters (GIULIANO) topics (CRS, Breast Cancer Screening, Eye exam)  Health Maintenance has been updated.  LINKS immunization registry triggered.  Immunizations were reconciled.

## 2022-02-15 ENCOUNTER — OFFICE VISIT (OUTPATIENT)
Dept: NEUROLOGY | Facility: CLINIC | Age: 72
End: 2022-02-15
Payer: MEDICARE

## 2022-02-15 VITALS
DIASTOLIC BLOOD PRESSURE: 80 MMHG | HEIGHT: 65 IN | BODY MASS INDEX: 20.49 KG/M2 | WEIGHT: 123 LBS | SYSTOLIC BLOOD PRESSURE: 132 MMHG | HEART RATE: 87 BPM

## 2022-02-15 DIAGNOSIS — G40.209 PARTIAL EPILEPSY WITH IMPAIRMENT OF CONSCIOUSNESS: Primary | ICD-10-CM

## 2022-02-15 DIAGNOSIS — Z51.81 MEDICATION MONITORING ENCOUNTER: ICD-10-CM

## 2022-02-15 PROCEDURE — 99999 PR PBB SHADOW E&M-EST. PATIENT-LVL III: ICD-10-PCS | Mod: PBBFAC,,, | Performed by: PSYCHIATRY & NEUROLOGY

## 2022-02-15 PROCEDURE — 99214 PR OFFICE/OUTPT VISIT, EST, LEVL IV, 30-39 MIN: ICD-10-PCS | Mod: S$PBB,,, | Performed by: PSYCHIATRY & NEUROLOGY

## 2022-02-15 PROCEDURE — 99999 PR PBB SHADOW E&M-EST. PATIENT-LVL III: CPT | Mod: PBBFAC,,, | Performed by: PSYCHIATRY & NEUROLOGY

## 2022-02-15 PROCEDURE — 99214 OFFICE O/P EST MOD 30 MIN: CPT | Mod: S$PBB,,, | Performed by: PSYCHIATRY & NEUROLOGY

## 2022-02-15 PROCEDURE — 99213 OFFICE O/P EST LOW 20 MIN: CPT | Mod: PBBFAC | Performed by: PSYCHIATRY & NEUROLOGY

## 2022-02-15 NOTE — PROGRESS NOTES
"Name: Onelia Duarte  MRN: [id number]   CSN: [id number]      Date: 02/15/2022    HISTORY OF PRESENT ILLNESS (HPI)  The patient is a 71 y.o. presents for follow up for epilepsy    Onset was at age 12 and she had single generalized convulsion prior to Erica in the setting of medication changes.     Per MILLER'Mara 2018:   "She has had a long history of partial complex seizures characterized by transient alteration in consciousness with amnesia and occasional repetitive movements affecting the hands and mouth. She had an MRI scan of the brain done in 1992 that was normal. An EEG done at that time showed evidence of right frontal sharp focus. She had been doing well with a combination of Keppra, Lamictal and clonazepam and has had no spells in over a year.  In the past her seizures have been brief and were usually triggered by stress at work.  She is now retired.  She does report that she has had occasional episodes when she feels she might have had a spells but this passes.  This has been very infrequent usually triggered by excessive exertion on stress.  She was last seen by me 2 years ago and denies any new medical problems otherwise.  She came to the clinic accompanied by her .  She is planned to her vacation to [place] next month."      Interim History  No further seizures, ongoing follow up regarding scoliosis and back pain    7/2021  No further seizures, stressor of  passing but coping well  3/2020  No further auras  10/2019  Continues with occasional episodes of transient staring for a few seconds followed by 1-2 minutes confusion, patient and  estimate this happens about 4/year with no escalation in frequency in recent years.  She has never driven.    ED visits  Episodes of SE  Change in meds    Seizure Seminology  Seizure Type 1  Classification:   Aura -   Ictus  - Nonconv -  - Conv -  - Duration -   Post-ictal  - Symptoms  - Duration  Age of onset   Current Seizure Frequency -    Last " Seizure      sz per month 2019 2020   Kamran     Feb     Mar     Apr     May 1    Kj     Jul     Aug     Sep     Oct     Nov     Dec     Tot       Seizure Triggers  Sleep Deprivation -  None  Other medications -  None   Benadral   Tramadol   Other  Psych/stress -  None  Photic stimulation -  None  Hyperventilation -  None  Medical Problems -  None  Menses -   No  Sensory Stimulation    Light  No   Sound  No   Problem Solv No   Other  No  Missed dose of Rx None    AED Treatments  Present regimen  lamotrigine (Lamictal, LTG) 200mg bid  levetiracetam (Keppra, LEV) 750mg qid  clonazepam (Klonopin, CZP) 1/2/1/2    Prior treatments  phenytoin (Dilantin, PHT)  primidone (Mysoline, PRM)  Not tried  acetazolamide (Diamox, AZM)  Amantadine  brivitiracetam (Briviact, BRV)  carbamazepine (Tegretol, CBZ)  clobazam (Onfi or Frizium, CLB)  ethosuximide (Zarontin, ESM)  eslicarbazine (Aptiom, ESL)  felbamate (felbatol, FBM)  gabapentin (Neurontin, GPN)  lacosamide (Vimpat, LCS)   methsuximide (Celontin, MSM)  oxcarbazepine (Trileptal OXC)  perampanel (Fycompa, FCP)   phenobarbital (Pb)  pregabalin (Lyrica, PGB)  rufinamide (Banzel, RUF)  tiagabine (Gabatril,  TGB)  topiramate (Topamax, TPM)  viagabatrin, (Sabril, VGB)  vagal nerve stimulator (VNS)  valproic acid (Depakote, VPA)  zonisamide (Zonegran, ZNA)  Benzodiazepines  diazepam - rectal (Diastatl)  diazepam - oral (Valium, DZ)  clorazepate (Tranxene, CLZ)  Ativan  Brain Stimulation  Vagal Nerve Stimulation-n/a  DBS- n/a    Adherence/Compliance method  Memory - yes  Mom or Spouse - Yes  Pill Box - no  Bird calendar - no  Turn over medication bottle - no  Phone alarm - no    Seizure Evaluation  EEG Routine -   EEG Ambulatory -   EEG\Video Monitoring -   MRI/MRA -   CT/CTA Scan -   PET Scan -   Neuropsychological evaluation -   DEXA Scan    Potential Epilepsy Risk Factors:   Pregnancy/Labor/Delivery - full term uncomplicated pregnancy labor and vaginal delivery  Febrile seizures -  none  Head injury  - none  CNS infection - none     Stroke - none  Family Hx of Sz - none    PAST MEDICAL HISTORY:   Active Ambulatory Problems     Diagnosis Date Noted    Partial epilepsy with impairment of consciousness 07/29/2012    Osteoporosis     Hypothyroidism (acquired) 06/17/2020     Resolved Ambulatory Problems     Diagnosis Date Noted    No Resolved Ambulatory Problems     Past Medical History:   Diagnosis Date    Seizures         PAST SURGICAL HISTORY:   Past Surgical History:   Procedure Laterality Date    COLONOSCOPY  07/24/2019    Diverticulosis in the sigmoid and the ascending colon. Repeat in 5 years.    HYSTERECTOMY      KNEE SURGERY Right 2019        FAMILY HISTORY: No family history on file.      SOCIAL HISTORY:   Social History     Socioeconomic History    Marital status:    Tobacco Use    Smoking status: Never Smoker    Smokeless tobacco: Never Used   Substance and Sexual Activity    Alcohol use: No    Drug use: Never    Sexual activity: Not Currently     Partners: Male     Birth control/protection: Post-menopausal        SUBSTANCE USE:  Social History     Tobacco Use    Smoking status: Never Smoker    Smokeless tobacco: Never Used   Substance and Sexual Activity    Alcohol use: No    Drug use: Never    Sexual activity: Not Currently     Partners: Male     Birth control/protection: Post-menopausal      Social History     Tobacco Use    Smoking status: Never Smoker    Smokeless tobacco: Never Used   Substance Use Topics    Alcohol use: No        ALLERGIES: Codeine, Doxycycline hyclate, and Oxycodone-acetaminophen     There were no vitals taken for this visit.    Higher Cortical Function:    Patient is a well developed, pleasant, well groomed individual appearing their stated age  Oriented - intact to person, place and time and followed two step instruction correctly.    Fund of knowledge was appropriate.    R-L Orientation - Intact  Language - Speech was fluent  without evidence for an aphasia.    Cranial Nerves II - XII:    EOMs were intact with normal smooth and no nystagmus.    PERRLA. D/C  Visual fields were full to confrontation.    Motor - facial movement was symmetrical and normal.    Facial sensory - Light touch and pin prick sensations were normal.    Hearing was normal to finger rub.  Palate moved well and was symmetrical with normal palatal and oral sensation.    Tongue movement was full   Normal power and bulk was found in the massiter and rotator muscles of the neck.  Motor: Power, bulk and tone were normal in all extremities.  Sensory: Light touch, pin prick, vibration and position senses were normal in all extremities.    Coordination:       Rapid alternating movements and rapid finger tapping - normal.       Finger to nose - nl.       Arm roll - symmetrical.    Gait:  Station, gait walking were done without difficulty and Romberg was negative.    Deep tendon reflexes:    Reflex L R   Bicpets 2+ 2+   Tricepts 2+ 2+   Brachio-radialis 2+ 2+   Knee 2+ 2+   Ankle 2+ 2+          Tremor: resting, postural, intentional - none  Pulses    Peripheral - strong and symmetrical      IMPRESSION  1. 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 Seizure Frequency: noted  1b. Seizure Intervention: yes  2.                   a. Etiology: unknown  b. Seizure Type: focal w sec GTC sz  c. Epilepsy Syndrome: n/a  3.                   a. Side-effects of AED: no                        b. Intervention for side-effects: n/a  4. Screening for psychiatric or behavioral disorders: yes  5. Personalized epilepsy safety issues and education: yes  6. Counseling for women of child-bearing potential and epilepsy: n/a  7. Referral of treatment-resistant  epilepsy to [facility] center (q 2 years): N/A.     The patient was asked to call me/the clinic 1 week after the test(s) are done to obtain results.  The following issues were  all discussed in detail with the patient and family/caregiver(s):     1. Diagnosis, plans, prognosis, medications and possible side-effects, risks and benefits of treatment, other alternatives to AEDs.  2. Risks related to continued seizures, status epilepticus, SUDEP, driving restrictions and seizure precautions ( no baths but showers are ok, no swimming unsupervised, no use of heavy machinery, no use of sharp moving objects, avoid heights).   3. Issues related to pregnancy, OCP and breast feeding as it relates to epilepsy.  4. The potential of teratogenicity and suicidal risks of anti-epileptic medications.  5.Avoid any activity that compromise patient safety related to seizures.      Questions and concerns raised by the patient and family/care-giver(s) were addressed and they indicated understanding of everything discussed and agreed to plans as above.

## 2022-03-12 ENCOUNTER — PATIENT MESSAGE (OUTPATIENT)
Dept: NEUROLOGY | Facility: CLINIC | Age: 72
End: 2022-03-12
Payer: MEDICARE

## 2022-03-14 ENCOUNTER — PATIENT MESSAGE (OUTPATIENT)
Dept: NEUROLOGY | Facility: CLINIC | Age: 72
End: 2022-03-14
Payer: MEDICARE

## 2022-04-14 ENCOUNTER — PATIENT MESSAGE (OUTPATIENT)
Dept: INTERNAL MEDICINE | Facility: CLINIC | Age: 72
End: 2022-04-14
Payer: MEDICARE

## 2022-04-20 ENCOUNTER — PATIENT MESSAGE (OUTPATIENT)
Dept: INTERNAL MEDICINE | Facility: CLINIC | Age: 72
End: 2022-04-20
Payer: MEDICARE

## 2022-04-21 ENCOUNTER — PATIENT MESSAGE (OUTPATIENT)
Dept: INTERNAL MEDICINE | Facility: CLINIC | Age: 72
End: 2022-04-21
Payer: MEDICARE

## 2022-04-26 ENCOUNTER — PATIENT MESSAGE (OUTPATIENT)
Dept: INTERNAL MEDICINE | Facility: CLINIC | Age: 72
End: 2022-04-26
Payer: MEDICARE

## 2022-04-29 ENCOUNTER — PATIENT MESSAGE (OUTPATIENT)
Dept: NEUROLOGY | Facility: CLINIC | Age: 72
End: 2022-04-29
Payer: MEDICARE

## 2022-05-10 ENCOUNTER — PATIENT MESSAGE (OUTPATIENT)
Dept: NEUROLOGY | Facility: CLINIC | Age: 72
End: 2022-05-10
Payer: MEDICARE

## 2022-08-11 ENCOUNTER — PATIENT MESSAGE (OUTPATIENT)
Dept: INTERNAL MEDICINE | Facility: CLINIC | Age: 72
End: 2022-08-11
Payer: MEDICARE

## 2022-08-11 LAB — CRC RECOMMENDATION EXT: NORMAL

## 2022-08-12 ENCOUNTER — PATIENT MESSAGE (OUTPATIENT)
Dept: INTERNAL MEDICINE | Facility: CLINIC | Age: 72
End: 2022-08-12
Payer: MEDICARE

## 2022-08-13 ENCOUNTER — PATIENT MESSAGE (OUTPATIENT)
Dept: INTERNAL MEDICINE | Facility: CLINIC | Age: 72
End: 2022-08-13
Payer: MEDICARE

## 2022-08-15 ENCOUNTER — PATIENT OUTREACH (OUTPATIENT)
Dept: ADMINISTRATIVE | Facility: HOSPITAL | Age: 72
End: 2022-08-15
Payer: MEDICARE

## 2022-08-15 NOTE — PROGRESS NOTES
Health Maintenance Due   Topic Date Due    Hepatitis C Screening  Never done    Shingles Vaccine (1 of 2) Never done    COVID-19 Vaccine (4 - Booster for Pfizer series) 02/06/2022     Chart review done.  updated. Immunizations reviewed & updated. Care Everywhere updated.  EGD/Colonoscopy report from 8/11/22 scanned into chart.

## 2022-09-29 ENCOUNTER — PES CALL (OUTPATIENT)
Dept: ADMINISTRATIVE | Facility: OTHER | Age: 72
End: 2022-09-29
Payer: MEDICARE

## 2022-10-03 DIAGNOSIS — Z71.89 COMPLEX CARE COORDINATION: ICD-10-CM

## 2022-10-28 ENCOUNTER — TELEPHONE (OUTPATIENT)
Dept: NEUROLOGY | Facility: CLINIC | Age: 72
End: 2022-10-28
Payer: MEDICARE

## 2022-10-28 ENCOUNTER — PATIENT MESSAGE (OUTPATIENT)
Dept: INTERNAL MEDICINE | Facility: CLINIC | Age: 72
End: 2022-10-28
Payer: MEDICARE

## 2022-10-28 NOTE — TELEPHONE ENCOUNTER
----- Message from Karen Wood sent at 10/28/2022  2:19 PM CDT -----  Regarding: appt  Contact: [phone]  Pt is requesting to reschedule her missed appt form 10/14. Pt also stated while she was in Rehab her medications was changed. Please call to discuss further.

## 2022-10-29 ENCOUNTER — PATIENT MESSAGE (OUTPATIENT)
Dept: NEUROLOGY | Facility: CLINIC | Age: 72
End: 2022-10-29
Payer: MEDICARE

## 2022-10-31 NOTE — TELEPHONE ENCOUNTER
Melva,  Could you give her a call for clarification?  My understanding is that she went from Klonapin 1/2/1/2 to 2mg bid only and has not had any seizures but it has only been about a month since the change.  I'm fine with her continuing at 2mg bid only if she would like.  CV

## 2022-10-31 NOTE — TELEPHONE ENCOUNTER
Returned call, left message asking for clarification on her request. Is she wanting to resume the 1mg BID or the 2mg BID? I also explained there are prescriptions for each that are both good through January/ February at her preferred [facility]. Asked her to please clarify her request via a message or call placed here at the clinic.

## 2022-12-22 ENCOUNTER — PATIENT MESSAGE (OUTPATIENT)
Dept: INTERNAL MEDICINE | Facility: CLINIC | Age: 72
End: 2022-12-22

## 2022-12-22 ENCOUNTER — OFFICE VISIT (OUTPATIENT)
Dept: INTERNAL MEDICINE | Facility: CLINIC | Age: 72
End: 2022-12-22
Payer: MEDICARE

## 2022-12-22 VITALS
BODY MASS INDEX: 21.98 KG/M2 | OXYGEN SATURATION: 96 % | WEIGHT: 131.94 LBS | SYSTOLIC BLOOD PRESSURE: 116 MMHG | HEART RATE: 81 BPM | HEIGHT: 65 IN | DIASTOLIC BLOOD PRESSURE: 70 MMHG

## 2022-12-22 DIAGNOSIS — E03.9 HYPOTHYROIDISM (ACQUIRED): Primary | ICD-10-CM

## 2022-12-22 DIAGNOSIS — M81.0 OSTEOPOROSIS, UNSPECIFIED OSTEOPOROSIS TYPE, UNSPECIFIED PATHOLOGICAL FRACTURE PRESENCE: ICD-10-CM

## 2022-12-22 DIAGNOSIS — G40.209 PARTIAL EPILEPSY WITH IMPAIRMENT OF CONSCIOUSNESS: ICD-10-CM

## 2022-12-22 DIAGNOSIS — E78.2 HYPERLIPIDEMIA, MIXED: ICD-10-CM

## 2022-12-22 PROCEDURE — 99214 OFFICE O/P EST MOD 30 MIN: CPT | Mod: S$PBB,,, | Performed by: INTERNAL MEDICINE

## 2022-12-22 PROCEDURE — 99213 OFFICE O/P EST LOW 20 MIN: CPT | Mod: PBBFAC | Performed by: INTERNAL MEDICINE

## 2022-12-22 PROCEDURE — 99999 PR PBB SHADOW E&M-EST. PATIENT-LVL III: ICD-10-PCS | Mod: PBBFAC,,, | Performed by: INTERNAL MEDICINE

## 2022-12-22 PROCEDURE — 99214 PR OFFICE/OUTPT VISIT, EST, LEVL IV, 30-39 MIN: ICD-10-PCS | Mod: S$PBB,,, | Performed by: INTERNAL MEDICINE

## 2022-12-22 PROCEDURE — 99999 PR PBB SHADOW E&M-EST. PATIENT-LVL III: CPT | Mod: PBBFAC,,, | Performed by: INTERNAL MEDICINE

## 2022-12-22 NOTE — PROGRESS NOTES
[facility] Note    Chief Complaint      Chief Complaint   Patient presents with    Annual Exam       History of Present Illness      Onelia Duarte is a 72 y.o. female with chronic conditions of Hypothyroidism, epilepsy, osteoporosis who presents today for: annual follow up chronic conditions.  Had a fall 9/19 with fracture of pelvis and right clavicle.  Conservative management and pt had full resolution of symptoms.    Hypothyroidism: Controlled on synthroid.  TSH due.    Epilepsy: Controlled on lamotrigine, keppra, clonazepam.  Sees Dr. Thomas. No recent seizure.  Osteoporosis: Controlled on prolia.  DEXA UTD.  Flu shot UTD.  TDAP 2020. Pneumovax UTD. COVID vaccine UTD.  Mammogram 2022, Dr. Sheets.  DEXA 3/2020, Dr. Sheets.  Cscope 2019, Dr. Akhtar, no polyps, 5 yr interval for previous hx of polyps.     Past Medical History:  Past Medical History:   Diagnosis Date    Osteoporosis     Seizures        Past Surgical History:   has a past surgical history that includes Hysterectomy; Colonoscopy (07/24/2019); and Knee surgery (Right, 2019).    Family History:  family history is not on file.     Social History:  Social History     Tobacco Use    Smoking status: Never    Smokeless tobacco: Never   Substance Use Topics    Alcohol use: No    Drug use: Never       I personally reviewed all past medical, surgical, social and family history.    Review of Systems   Constitutional:  Negative for chills, fever and malaise/fatigue.   Respiratory:  Negative for shortness of breath.    Cardiovascular:  Negative for chest pain.   Gastrointestinal:  Negative for constipation, diarrhea, nausea and vomiting.   Skin:  Negative for rash.   Neurological:  Negative for weakness.   All other systems reviewed and are negative.     Medications:  Outpatient Encounter Medications as of 12/22/2022   Medication Sig Dispense Refill    AA/prot/lysine/methio/vit C/B6 (A/G PRO ORAL)       ascorbic acid (VITAMIN  C) 1000 MG tablet Take 1,000 mg by mouth once daily.      calcium citrate-vitamin D3 315-200 mg (CITRACAL+D) 315-200 mg-unit per tablet Take 1 tablet by mouth 2 (two) times daily.      cholecalciferol, vitamin D3, 1,000 unit capsule Take 1,000 Units by mouth once daily.      clonazePAM (KLONOPIN) 1 MG tablet TAKE 1 TABLET BY MOUTH TWICE A DAY 60 tablet 5    clonazePAM (KLONOPIN) 2 MG Tab TAKE 1 TABLET BY MOUTH 2 TIMES DAILY. 60 tablet 5    cyanocobalamin (VITAMIN B-12) 1000 MCG tablet Take 100 mcg by mouth once daily.      lamoTRIgine (LAMICTAL) 200 MG tablet Take 1 tablet (200 mg total) by mouth 2 (two) times daily. No further refills without appointment 180 tablet 0    levETIRAcetam (KEPPRA) 750 MG Tab TAKE 1 TABLET (750 MG TOTAL) BY MOUTH 4 (FOUR) TIMES DAILY. 540 tablet 2    levothyroxine (SYNTHROID) 50 MCG tablet TAKE 1 TABLET BY MOUTH EVERY DAY 90 tablet 3    neomycin-polymyxin-dexamethasone (DEXACINE) 3.5 mg/g-10,000 unit/g-0.1 % Oint       PROLIA 60 mg/mL Syrg        No facility-administered encounter medications on file as of 12/22/2022.       Allergies:  Review of patient's allergies indicates:   Allergen Reactions    Codeine Nausea And Vomiting    Doxycycline hyclate Nausea And Vomiting    Oxycodone-acetaminophen Nausea And Vomiting       Health Maintenance:  Immunization History   Administered Date(s) Administered    COVID-19, MRNA, LN-S, PF (Pfizer) (Purple Cap) 02/26/2021, 03/19/2021, 10/06/2021    COVID-19, mRNA, LNP-S, bivalent booster, PF (PFIZER OMICRON) 10/04/2022    Influenza (FLUAD) - Quadrivalent - Adjuvanted - PF *Preferred* (65+) 10/07/2020, 10/26/2021    Influenza - High Dose - PF (65 years and older) 12/05/2017, 08/21/2018, 11/08/2019, 10/07/2020, 10/03/2022    Influenza - Trivalent - PF (ADULT) 10/06/2015, 12/28/2016    PPD Test 10/03/2022    Pneumococcal Conjugate - 13 Valent 07/30/2018    Pneumococcal Polysaccharide - 23 Valent 12/06/2009, 07/06/2017    Tdap 12/19/2020      Health  "Maintenance   Topic Date Due    Hepatitis C Screening  Never done    Mammogram  03/25/2023    DEXA Scan  03/25/2025    Lipid Panel  10/27/2026    TETANUS VACCINE  12/19/2030        Physical Exam      Vital Signs  Pulse: (!) 136  SpO2: 96 %  Pain Score: 0-No pain  Height and Weight  Height: 5' 5" (165.1 cm)  Weight: 59.8 kg (131 lb 15.1 oz)  BSA (Calculated - sq m): 1.66 sq meters  BMI (Calculated): 22  Weight in (lb) to have BMI = 25: 149.9]    Physical Exam  Vitals reviewed.   Constitutional:       Appearance: She is well-developed.   HENT:      Head: Normocephalic and atraumatic.      Right Ear: External ear normal.      Left Ear: External ear normal.   Cardiovascular:      Rate and Rhythm: Normal rate and regular rhythm.      Heart sounds: Normal heart sounds. No murmur heard.  Pulmonary:      Effort: Pulmonary effort is normal.      Breath sounds: Normal breath sounds. No wheezing or rales.   Abdominal:      General: Bowel sounds are normal. There is no distension.      Palpations: Abdomen is soft.      Tenderness: There is no abdominal tenderness.        Laboratory:  CBC:  Recent Labs   Lab 07/14/21  1124 10/27/21  0000   WBC 7.09 5.8   RBC 3.40 L 3.19 L   Hemoglobin 10.8 L 10.5 L   Hematocrit 33.7 L 30.8 L   Platelets 262 235   MCV 99 H 96.4   MCH 31.8 H 32.8   MCHC 32.0 34.0     CMP:  Recent Labs   Lab 07/14/21  1124 10/27/21  0000   Glucose 88 57 L   Calcium 10.8 H 9.8   Albumin 4.2  --    ALBUMIN  --  4.5   Total Protein 7.5 6.6   Sodium 139 140   Potassium 4.4 4.9   CO2 31 H 27   Chloride 100 103   BUN 11 15   Alkaline Phosphatase 97 85   ALT 16 16   AST 23 20   Total Bilirubin 0.8 0.6     URINALYSIS:  Recent Labs   Lab 11/08/21  1009   Color, UA YELLOW   Specific Gravity, UA 1.010   pH, UA 6.5   Protein, UA NEGATIVE   Glucose, UA NEGATIVE   Nitrite, UA NEGATIVE   Leukocytes, UA TRACE A      LIPIDS:  Recent Labs   Lab 10/27/21  0000   TSH 1.92   HDL 83 H   Cholesterol 166   Triglycerides 55   LDL " Calculated 74   Non-HDL Cholesterol 83     TSH:  Recent Labs   Lab 10/27/21  0000   TSH 1.92     A1C:        Assessment/Plan     Onelia Duarte is a 72 y.o.female with:    1. Hypothyroidism (acquired)  - CBC Auto Differential; Future  - TSH; Future  - T4, Free; Future  - CBC Auto Differential  - TSH  - T4, Free  Continue current meds.    2. Osteoporosis, unspecified osteoporosis type, unspecified pathological fracture presence  Continue current meds.    3. Partial epilepsy with impairment of consciousness  - LAMOTRIGINE LEVEL; Future  - Levetiracetam level; Future  - LAMOTRIGINE LEVEL  - Levetiracetam level  Continue current meds.  F/U with neuro  4. Hyperlipidemia, mixed  - Comprehensive Metabolic Panel; Future  - Lipid Panel; Future  - Comprehensive Metabolic Panel  - Lipid Panel       Chronic conditions status updated as per HPI.  Other than changes above, cont current medications and maintain follow up with specialists.  Follow up in about 1 year (around 12/22/2023) for Follow up visit.    Future Appointments   Date Time Provider Department Center   2/24/2023  9:20 AM Nabil Thomas MD [facility]       Jostin Downing MD  [facility]

## 2022-12-27 LAB
CHOLEST SERPL-MSCNC: 179 MG/DL (ref 0–200)
HDLC SERPL-MCNC: 88 MG/DL (ref 35–70)
LDLC SERPL CALC-MCNC: 79 MG/DL (ref 0–160)
TRIGL SERPL-MCNC: 75 MG/DL (ref 40–160)

## 2022-12-31 LAB
ALBUMIN: 4.4 G/DL (ref 3.5–5)
ALP SERPL-CCNC: 112 U/L (ref 38–126)
ALT SERPL W P-5'-P-CCNC: 17 U/L (ref 7–56)
ANION GAP SERPL CALC-SCNC: 13.5 MMOL/L (ref 9–18)
AST SERPL-CCNC: 22 U/L (ref 7–40)
BASOPHILS ABSOLUTE COUNT: 0 THOUSAND/UL (ref 0–0.2)
BASOPHILS NFR BLD AUTO: 0 %
BASOPHILS NFR BLD: 0.6 % (ref 0–2)
BILIRUB SERPL-MCNC: 0.5 MG/DL (ref 0–1.2)
BUN BLD-MCNC: 21 MG/DL (ref 7–21)
BUN/CREAT SERPL: 28 (ref 6–22)
CALC OSMOLALITY: 280 MOSM/KG (ref 275–295)
CALCIUM SERPL-MCNC: 9.7 MG/DL (ref 8.5–10.3)
CHLORIDE SERPL-SCNC: 100 MMOL/L (ref 98–107)
CHOL/HDLC RATIO: 2.03
CHOLEST SERPL-MSCNC: 179 MG/DL (ref 100–200)
CO2 SERPL-SCNC: 30 MMOL/L (ref 21–31)
CREAT SERPL-MCNC: 0.74 MG/DL (ref 0.5–1)
EGFR: 87 ML/MIN
EOSINOPHIL NFR BLD AUTO: 0 %
EOSINOPHIL NFR BLD: 2.5 % (ref 0–4)
EOSINOPHILS ABSOLUTE COUNT: 0.2 THOUSAND/UL (ref 0–0.7)
ERYTHROCYTE [DISTWIDTH] IN BLOOD BY AUTOMATED COUNT: 12.9 % (ref 12–15.3)
FREE T4: 1.3 NG/DL (ref 0.9–1.7)
GLUCOSE SERPL-MCNC: 95 MG/DL (ref 70–100)
HCT VFR BLD AUTO: 33.9 % (ref 37–47)
HDLC SERPL-MCNC: 88 MG/DL (ref 40–75)
HGB BLD-MCNC: 11.5 GM/DL (ref 12–16)
LAMOTRIGINE SERPL-MCNC: 12.2 MCG/ML (ref 4–18)
LDLC SERPL CALC-MCNC: 79 MG/DL (ref 0–125)
LEVETIRACETAM SERPL-MCNC: 43.5 MCG/ML
LYMPHOCYTES %: 35.4 % (ref 15–45)
LYMPHOCYTES ABSOLUTE COUNT: 2.2 THOUSAND/UL (ref 1–4.2)
LYMPHOCYTES NFR BLD AUTO: 0 %
MCH RBC QN AUTO: 31.9 PG (ref 27–33)
MCHC RBC AUTO-ENTMCNC: 33.8 G/DL (ref 32–36)
MCV RBC AUTO: 94.3 FL (ref 81–99)
MONOCYTES %: 7.8 % (ref 3–13)
MONOCYTES ABSOLUTE COUNT: 0.5 THOUSAND/UL (ref 0.1–0.8)
MONOCYTES RELATIVE: 0
NEUTROPHILS ABSOLUTE COUNT: 3.3 THOUSAND/UL (ref 2.1–7.6)
NEUTROPHILS RELATIVE PERCENT: 0
NEUTROPHILS RELATIVE PERCENT: 53.7 % (ref 32–80)
PLATELET # BLD AUTO: 275 THOUSAND/UL (ref 150–350)
PMV BLD AUTO: 10.5 FL (ref 7–10.2)
POTASSIUM SERPL-SCNC: 4.5 MMOL/L (ref 3.5–5)
RBC # BLD AUTO: 3.6 MILLION/UL (ref 4.2–5.4)
SODIUM BLD-SCNC: 139 MMOL/L (ref 135–145)
TOTAL PROTEIN: 6.7 G/DL (ref 6.3–8.2)
TRIGL SERPL-MCNC: 75 MG/DL (ref 30–150)
TSH SERPL DL<=0.005 MIU/L-ACNC: 2.79 UIU/ML (ref 0.35–4)
WBC # BLD AUTO: 6.2 THOUSAND/UL (ref 4.5–11)

## 2023-01-18 ENCOUNTER — PATIENT OUTREACH (OUTPATIENT)
Dept: ADMINISTRATIVE | Facility: HOSPITAL | Age: 73
End: 2023-01-18
Payer: MEDICARE

## 2023-01-18 NOTE — PROGRESS NOTES
Health Maintenance Due   Topic Date Due    Hepatitis C Screening  Never done    Shingles Vaccine (1 of 2) Never done     Chart review done. HM updated. Immunizations reviewed & updated. Care Everywhere updated.  Labs from [id number] uploaded to chart.

## 2023-01-23 ENCOUNTER — PATIENT MESSAGE (OUTPATIENT)
Dept: INTERNAL MEDICINE | Facility: CLINIC | Age: 73
End: 2023-01-23
Payer: MEDICARE

## 2023-01-25 ENCOUNTER — TELEPHONE (OUTPATIENT)
Dept: INTERNAL MEDICINE | Facility: CLINIC | Age: 73
End: 2023-01-25
Payer: MEDICARE

## 2023-01-25 NOTE — TELEPHONE ENCOUNTER
----- Message from Radha Jacobo sent at 1/25/2023 10:51 AM CST -----  Contact: [phone]  Pt is calling in regards to the virtual appt she has on 02/01 and she would like to speak to the nurse please give return call

## 2023-01-30 ENCOUNTER — PATIENT MESSAGE (OUTPATIENT)
Dept: PRIMARY CARE CLINIC | Facility: CLINIC | Age: 73
End: 2023-01-30
Payer: MEDICARE

## 2023-01-31 ENCOUNTER — OFFICE VISIT (OUTPATIENT)
Dept: NEUROLOGY | Facility: CLINIC | Age: 73
End: 2023-01-31
Payer: MEDICARE

## 2023-01-31 VITALS
HEIGHT: 65 IN | WEIGHT: 134.38 LBS | DIASTOLIC BLOOD PRESSURE: 86 MMHG | HEART RATE: 77 BPM | BODY MASS INDEX: 22.39 KG/M2 | SYSTOLIC BLOOD PRESSURE: 133 MMHG

## 2023-01-31 DIAGNOSIS — G40.209 COMPLEX PARTIAL SEIZURES WITH CONSCIOUSNESS IMPAIRED: ICD-10-CM

## 2023-01-31 PROCEDURE — 99214 PR OFFICE/OUTPT VISIT, EST, LEVL IV, 30-39 MIN: ICD-10-PCS | Mod: S$PBB,,, | Performed by: PSYCHIATRY & NEUROLOGY

## 2023-01-31 PROCEDURE — 99214 OFFICE O/P EST MOD 30 MIN: CPT | Mod: S$PBB,,, | Performed by: PSYCHIATRY & NEUROLOGY

## 2023-01-31 PROCEDURE — 99213 OFFICE O/P EST LOW 20 MIN: CPT | Mod: PBBFAC | Performed by: PSYCHIATRY & NEUROLOGY

## 2023-01-31 PROCEDURE — 99999 PR PBB SHADOW E&M-EST. PATIENT-LVL III: ICD-10-PCS | Mod: PBBFAC,,, | Performed by: PSYCHIATRY & NEUROLOGY

## 2023-01-31 PROCEDURE — 99999 PR PBB SHADOW E&M-EST. PATIENT-LVL III: CPT | Mod: PBBFAC,,, | Performed by: PSYCHIATRY & NEUROLOGY

## 2023-01-31 RX ORDER — CLONAZEPAM 2 MG/1
2 TABLET ORAL 2 TIMES DAILY
Qty: 60 TABLET | Refills: 5 | Status: SHIPPED | OUTPATIENT
Start: 2023-01-31 | End: 2023-06-20 | Stop reason: SDUPTHER

## 2023-01-31 NOTE — PROGRESS NOTES
"Name: Onelia Duarte  MRN: [id number]   CSN: [id number]      Date: 01/31/2023    HISTORY OF PRESENT ILLNESS (HPI)  The patient is a 72 y.o. presents for follow up for epilepsy    Onset was at age 12 and she had single generalized convulsion prior to Erica in the setting of medication changes.     Per MILLER'Mara 2018:   "She has had a long history of partial complex seizures characterized by transient alteration in consciousness with amnesia and occasional repetitive movements affecting the hands and mouth. She had an MRI scan of the brain done in 1992 that was normal. An EEG done at that time showed evidence of right frontal sharp focus. She had been doing well with a combination of Keppra, Lamictal and clonazepam and has had no spells in over a year.  In the past her seizures have been brief and were usually triggered by stress at work.  She is now retired.  She does report that she has had occasional episodes when she feels she might have had a spells but this passes.  This has been very infrequent usually triggered by excessive exertion on stress.  She was last seen by me 2 years ago and denies any new medical problems otherwise.  She came to the clinic accompanied by her .  She is planned to her vacation to [place] next month."      Interim History    Fall last September with pelvic fx but recovered well following rehab stay, changed CLZ to 2mg bid during rehab stay due to dose confusion but tolerating well    2/2022  No further seizures, ongoing follow up regarding scoliosis and back pain  7/2021  No further seizures, stressor of  passing but coping well  3/2020  No further auras  10/2019  Continues with occasional episodes of transient staring for a few seconds followed by 1-2 minutes confusion, patient and  estimate this happens about 4/year with no escalation in frequency in recent years.  She has never driven.    ED visits  Episodes of SE  Change in meds    Seizure " Seminology  Seizure Type 1  Classification:   Aura -   Ictus  - Nonconv -  - Conv -  - Duration -   Post-ictal  - Symptoms  - Duration  Age of onset   Current Seizure Frequency -    Last Seizure      sz per month 2019 2020   Kamran     Feb     Mar     Apr     May 1    Kj     Jul     Aug     Sep     Oct     Nov     Dec     Tot       Seizure Triggers  Sleep Deprivation -  None  Other medications -  None   Benadral   Tramadol   Other  Psych/stress -  None  Photic stimulation -  None  Hyperventilation -  None  Medical Problems -  None  Menses -   No  Sensory Stimulation    Light  No   Sound  No   Problem Solv No   Other  No  Missed dose of Rx None    AED Treatments  Present regimen  lamotrigine (Lamictal, LTG) 200mg bid  levetiracetam (Keppra, LEV) 750mg qid  clonazepam (Klonopin, CZP) 2mg bid    Prior treatments  phenytoin (Dilantin, PHT)  primidone (Mysoline, PRM)  Not tried  acetazolamide (Diamox, AZM)  Amantadine  brivitiracetam (Briviact, BRV)  carbamazepine (Tegretol, CBZ)  clobazam (Onfi or Frizium, CLB)  ethosuximide (Zarontin, ESM)  eslicarbazine (Aptiom, ESL)  felbamate (felbatol, FBM)  gabapentin (Neurontin, GPN)  lacosamide (Vimpat, LCS)   methsuximide (Celontin, MSM)  oxcarbazepine (Trileptal OXC)  perampanel (Fycompa, FCP)   phenobarbital (Pb)  pregabalin (Lyrica, PGB)  rufinamide (Banzel, RUF)  tiagabine (Gabatril,  TGB)  topiramate (Topamax, TPM)  viagabatrin, (Sabril, VGB)  vagal nerve stimulator (VNS)  valproic acid (Depakote, VPA)  zonisamide (Zonegran, ZNA)  Benzodiazepines  diazepam - rectal (Diastatl)  diazepam - oral (Valium, DZ)  clorazepate (Tranxene, CLZ)  Ativan  Brain Stimulation  Vagal Nerve Stimulation-n/a  DBS- n/a    Adherence/Compliance method  Memory - yes  Mom or Spouse - Yes  Pill Box - no  Bird calendar - no  Turn over medication bottle - no  Phone alarm - no    Seizure Evaluation  EEG Routine -   EEG Ambulatory -   EEG\Video Monitoring -   MRI/MRA -   CT/CTA Scan -   PET Scan -  "  Neuropsychological evaluation -   DEXA Scan    Potential Epilepsy Risk Factors:   Pregnancy/Labor/Delivery - full term uncomplicated pregnancy labor and vaginal delivery  Febrile seizures - none  Head injury  - none  CNS infection - none     Stroke - none  Family Hx of Sz - none    PAST MEDICAL HISTORY:   Active Ambulatory Problems     Diagnosis Date Noted    Partial epilepsy with impairment of consciousness 07/29/2012    Osteoporosis     Hypothyroidism (acquired) 06/17/2020     Resolved Ambulatory Problems     Diagnosis Date Noted    No Resolved Ambulatory Problems     Past Medical History:   Diagnosis Date    Seizures         PAST SURGICAL HISTORY:   Past Surgical History:   Procedure Laterality Date    COLONOSCOPY  07/24/2019    Diverticulosis in the sigmoid and the ascending colon. Repeat in 5 years.    HYSTERECTOMY      KNEE SURGERY Right 2019        FAMILY HISTORY: No family history on file.      SOCIAL HISTORY:   Social History     Socioeconomic History    Marital status:    Tobacco Use    Smoking status: Never    Smokeless tobacco: Never   Substance and Sexual Activity    Alcohol use: No    Drug use: Never    Sexual activity: Not Currently     Partners: Male     Birth control/protection: Post-menopausal        SUBSTANCE USE:  Social History     Tobacco Use    Smoking status: Never    Smokeless tobacco: Never   Substance and Sexual Activity    Alcohol use: No    Drug use: Never    Sexual activity: Not Currently     Partners: Male     Birth control/protection: Post-menopausal      Social History     Tobacco Use    Smoking status: Never    Smokeless tobacco: Never   Substance Use Topics    Alcohol use: No        ALLERGIES: Codeine, Doxycycline hyclate, Oxycodone-acetaminophen, and Percodan lakeshia     /86   Pulse 77   Ht 5' 5" (1.651 m)   Wt 61 kg (134 lb 5.9 oz)   BMI 22.36 kg/m²     Higher Cortical Function:    Patient is a well developed, pleasant, well groomed individual appearing their " stated age  Oriented - intact to person, place and time and followed two step instruction correctly.    Fund of knowledge was appropriate.    R-L Orientation - Intact  Language - Speech was fluent without evidence for an aphasia.    Cranial Nerves II - XII:    EOMs were intact with normal smooth and no nystagmus.    PERRLA. D/C  Visual fields were full to confrontation.    Motor - facial movement was symmetrical and normal.    Facial sensory - Light touch and pin prick sensations were normal.    Hearing was normal to finger rub.  Palate moved well and was symmetrical with normal palatal and oral sensation.    Tongue movement was full   Normal power and bulk was found in the massiter and rotator muscles of the neck.  Motor: Power, bulk and tone were normal in all extremities.  Sensory: Light touch, pin prick, vibration and position senses were normal in all extremities.    Coordination:       Rapid alternating movements and rapid finger tapping - normal.       Finger to nose - nl.       Arm roll - symmetrical.    Gait:  Station, gait walking were done without difficulty and Romberg was negative.    Deep tendon reflexes:    Reflex L R   Bicpets 2+ 2+   Tricepts 2+ 2+   Brachio-radialis 2+ 2+   Knee 2+ 2+   Ankle 2+ 2+          Tremor: resting, postural, intentional - none  Pulses    Peripheral - strong and symmetrical      IMPRESSION  1. Likely CLRE    DISPOSITION:   Continue current regiment    Return 6 months    Greater than 30 minutes spent in chart review, documentation, independent review of imaging, and face to face time with patient    2014 EPILEPSY QUALITY MEASUREMENT (AAN)  1 a. Seizure Frequency: noted  1b. Seizure Intervention: yes  2.                   a. Etiology: unknown  b. Seizure Type: focal w sec GTC sz  c. Epilepsy Syndrome: n/a  3.                   a. Side-effects of AED: no                        b. Intervention for side-effects: n/a  4. Screening for psychiatric or behavioral disorders: yes  5.  Personalized epilepsy safety issues and education: yes  6. Counseling for women of child-bearing potential and epilepsy: n/a  7. Referral of treatment-resistant epilepsy to comprehensive epilepsy center (q 2 years): N/A.     The patient was asked to call me/the clinic 1 week after the test(s) are done to obtain results.  The following issues were  all discussed in detail with the patient and family/caregiver(s):     1. Diagnosis, plans, prognosis, medications and possible side-effects, risks and benefits of treatment, other alternatives to AEDs.  2. Risks related to continued seizures, status epilepticus, SUDEP, driving restrictions and seizure precautions ( no baths but showers are ok, no swimming unsupervised, no use of heavy machinery, no use of sharp moving objects, avoid heights).   3. Issues related to pregnancy, OCP and breast feeding as it relates to epilepsy.  4. The potential of teratogenicity and suicidal risks of anti-epileptic medications.  5.Avoid any activity that compromise patient safety related to seizures.      Questions and concerns raised by the patient and family/care-giver(s) were addressed and they indicated understanding of everything discussed and agreed to plans as above.

## 2023-02-01 ENCOUNTER — OFFICE VISIT (OUTPATIENT)
Dept: PRIMARY CARE CLINIC | Facility: CLINIC | Age: 73
End: 2023-02-01
Payer: MEDICARE

## 2023-02-01 DIAGNOSIS — E03.9 HYPOTHYROIDISM (ACQUIRED): ICD-10-CM

## 2023-02-01 DIAGNOSIS — E78.2 HYPERLIPIDEMIA, MIXED: Primary | ICD-10-CM

## 2023-02-01 DIAGNOSIS — M81.0 OSTEOPOROSIS, UNSPECIFIED OSTEOPOROSIS TYPE, UNSPECIFIED PATHOLOGICAL FRACTURE PRESENCE: ICD-10-CM

## 2023-02-01 DIAGNOSIS — G40.209 PARTIAL EPILEPSY WITH IMPAIRMENT OF CONSCIOUSNESS: ICD-10-CM

## 2023-02-01 PROCEDURE — 99213 OFFICE O/P EST LOW 20 MIN: CPT | Mod: 95,,, | Performed by: INTERNAL MEDICINE

## 2023-02-01 PROCEDURE — 99213 PR OFFICE/OUTPT VISIT, EST, LEVL III, 20-29 MIN: ICD-10-PCS | Mod: 95,,, | Performed by: INTERNAL MEDICINE

## 2023-02-01 NOTE — PROGRESS NOTES
[facility] Virtual Visit Note    The patient location is: [place]  The chief complaint leading to consultation is: follow up lab results  Visit type: Virtual visit with synchronous audio and video  Total time spent with patient: 15 min  Each patient to whom he or she provides medical services by telemedicine is:  (1) informed of the relationship between the physician and patient and the respective role of any other health care provider with respect to management of the patient; and (2) notified that he or she may decline to receive medical services by telemedicine and may withdraw from such care at any time.      Chief Complaint    No chief complaint on file.      History of Present Illness      Onelia Duarte is a 72 y.o. female with chronic conditions of Hypothyroidism, epilepsy, osteoporosis who presents today for: follow up lab results.  LDL 79, HDL 88.  Discussed fatty foods in her diet: milk, cheese mostly.    The 10-year ASCVD risk score (Viri HERNANDEZ, et al., 2019) is: 9%    Values used to calculate the score:      Age: 72 years      Sex: Female      Is Non- : No      Diabetic: No      Tobacco smoker: No      Systolic Blood Pressure: 133 mmHg      Is BP treated: No      HDL Cholesterol: 88 mg/dL      Total Cholesterol: 179 mg/dL   Pt hesitant to start a statin.    Mildly anemic but stable and actually improved from last check.      Past Medical History:  Past Medical History:   Diagnosis Date    Osteoporosis     Seizures        Past Surgical History:   has a past surgical history that includes Hysterectomy; Colonoscopy (07/24/2019); and Knee surgery (Right, 2019).    Family History:  family history is not on file.     Social History:  Social History     Tobacco Use    Smoking status: Never    Smokeless tobacco: Never   Substance Use Topics    Alcohol use: No    Drug use: Never       Review of Systems   Constitutional:  Negative for chills, fever and  malaise/fatigue.   Respiratory:  Negative for shortness of breath.    Cardiovascular:  Negative for chest pain.   Gastrointestinal:  Negative for constipation, diarrhea, nausea and vomiting.   Skin:  Negative for rash.   Neurological:  Negative for weakness.   All other systems reviewed and are negative.     Medications:  Outpatient Encounter Medications as of 2/1/2023   Medication Sig Dispense Refill    ascorbic acid (VITAMIN C) 1000 MG tablet Take 1,000 mg by mouth once daily.      calcium citrate-vitamin D3 315-200 mg (CITRACAL+D) 315-200 mg-unit per tablet Take 1 tablet by mouth 2 (two) times daily.      cholecalciferol, vitamin D3, 1,000 unit capsule Take 1,000 Units by mouth once daily.      clonazePAM (KLONOPIN) 2 MG Tab Take 1 tablet (2 mg total) by mouth 2 (two) times daily. 60 tablet 5    cyanocobalamin (VITAMIN B-12) 1000 MCG tablet Take 100 mcg by mouth once daily.      lamoTRIgine (LAMICTAL) 200 MG tablet Take 1 tablet (200 mg total) by mouth 2 (two) times daily. 180 tablet 1    levETIRAcetam (KEPPRA) 750 MG Tab TAKE 1 TABLET (750 MG TOTAL) BY MOUTH 4 (FOUR) TIMES DAILY. 540 tablet 2    levothyroxine (SYNTHROID) 50 MCG tablet TAKE 1 TABLET BY MOUTH EVERY DAY 90 tablet 3    PROLIA 60 mg/mL Syrg       [DISCONTINUED] clonazePAM (KLONOPIN) 1 MG tablet TAKE 1 TABLET BY MOUTH TWICE A DAY 60 tablet 5    [DISCONTINUED] clonazePAM (KLONOPIN) 2 MG Tab TAKE 1 TABLET BY MOUTH 2 TIMES DAILY. 60 tablet 5     No facility-administered encounter medications on file as of 2/1/2023.       Allergies:  Review of patient's allergies indicates:   Allergen Reactions    Codeine Nausea And Vomiting    Doxycycline hyclate Nausea And Vomiting    Oxycodone-acetaminophen Nausea And Vomiting    Percodan lakeshia Nausea And Vomiting       Health Maintenance:  Immunization History   Administered Date(s) Administered    COVID-19, MRNA, LN-S, PF (Pfizer) (Purple Cap) 02/26/2021, 03/19/2021, 10/06/2021    COVID-19, mRNA, LNP-S, bivalent  booster, PF (PFIZER OMICRON) 10/04/2022    Influenza (FLUAD) - Quadrivalent - Adjuvanted - PF *Preferred* (65+) 10/07/2020, 10/26/2021    Influenza - High Dose - PF (65 years and older) 12/05/2017, 08/21/2018, 11/08/2019, 10/07/2020, 10/03/2022    Influenza - Trivalent - PF (ADULT) 10/06/2015, 12/28/2016    PPD Test 10/03/2022    Pneumococcal Conjugate - 13 Valent 07/30/2018    Pneumococcal Polysaccharide - 23 Valent 12/06/2009, 07/06/2017    Tdap 12/19/2020      Health Maintenance   Topic Date Due    Hepatitis C Screening  Never done    Mammogram  03/25/2023    DEXA Scan  03/25/2025    Lipid Panel  12/27/2027    TETANUS VACCINE  12/19/2030        Physical Exam       ]    Physical Exam  Vitals reviewed.   Constitutional:       Appearance: She is well-developed.   HENT:      Head: Normocephalic and atraumatic.      Right Ear: External ear normal.      Left Ear: External ear normal.   Cardiovascular:      Rate and Rhythm: Normal rate and regular rhythm.      Heart sounds: Normal heart sounds. No murmur heard.  Pulmonary:      Effort: Pulmonary effort is normal.      Breath sounds: Normal breath sounds. No wheezing or rales.   Abdominal:      General: Bowel sounds are normal. There is no distension.      Palpations: Abdomen is soft.      Tenderness: There is no abdominal tenderness.        Laboratory:  CBC:  Recent Labs   Lab 07/14/21  1124 10/27/21  0000   WBC 7.09 5.8   RBC 3.40 L 3.19 L   Hemoglobin 10.8 L 10.5 L   Hematocrit 33.7 L 30.8 L   Platelets 262 235   MCV 99 H 96.4   MCH 31.8 H 32.8   MCHC 32.0 34.0     CMP:  Recent Labs   Lab 07/14/21  1124 10/27/21  0000   Glucose 88 57 L   Calcium 10.8 H 9.8   Albumin 4.2  --    ALBUMIN  --  4.5   Total Protein 7.5 6.6   Sodium 139 140   Potassium 4.4 4.9   CO2 31 H 27   Chloride 100 103   BUN 11 15   Alkaline Phosphatase 97 85   ALT 16 16   AST 23 20   Total Bilirubin 0.8 0.6     URINALYSIS:  Recent Labs   Lab 11/08/21  1009   Color, UA YELLOW   Specific Gravity, UA  1.010   pH, UA 6.5   Protein, UA NEGATIVE   Glucose, UA NEGATIVE   Nitrite, UA NEGATIVE   Leukocytes, UA TRACE A      LIPIDS:  Recent Labs   Lab 10/27/21  0000 12/27/22  0000   TSH 1.92  --    HDL 83 H 88 A   Cholesterol 166 179   Triglycerides 55 75   LDL Calculated 74 79   Non-HDL Cholesterol 83  --      TSH:  Recent Labs   Lab 10/27/21  0000   TSH 1.92     A1C:        Assessment/Plan     Onelia Duarte is a 72 y.o.female with:    1. Hyperlipidemia, mixed  Continue diet changes.    2. Hypothyroidism (acquired)  Continue current meds.    3. Partial epilepsy with impairment of consciousness  Continue current meds.  F/U with neurology.  4. Osteoporosis, unspecified osteoporosis type, unspecified pathological fracture presence  Continue current meds.      Chronic conditions status updated as per HPI.  Other than changes above, cont current medications and maintain follow up with specialists.  No follow-ups on file.    No future appointments.    Jostin Downing MD  [facility]

## 2023-02-23 ENCOUNTER — PATIENT MESSAGE (OUTPATIENT)
Dept: PRIMARY CARE CLINIC | Facility: CLINIC | Age: 73
End: 2023-02-23
Payer: MEDICARE

## 2023-05-01 DIAGNOSIS — G40.209 COMPLEX PARTIAL SEIZURES WITH CONSCIOUSNESS IMPAIRED: ICD-10-CM

## 2023-05-01 RX ORDER — LAMOTRIGINE 200 MG/1
TABLET ORAL
Qty: 180 TABLET | Refills: 3 | Status: SHIPPED | OUTPATIENT
Start: 2023-05-01 | End: 2023-06-20 | Stop reason: SDUPTHER

## 2023-05-03 DIAGNOSIS — Z71.89 COMPLEX CARE COORDINATION: ICD-10-CM

## 2023-06-02 ENCOUNTER — OFFICE VISIT (OUTPATIENT)
Dept: PRIMARY CARE CLINIC | Facility: CLINIC | Age: 73
End: 2023-06-02
Payer: MEDICARE

## 2023-06-02 VITALS
HEART RATE: 96 BPM | WEIGHT: 128.31 LBS | SYSTOLIC BLOOD PRESSURE: 128 MMHG | DIASTOLIC BLOOD PRESSURE: 70 MMHG | BODY MASS INDEX: 21.35 KG/M2 | OXYGEN SATURATION: 99 %

## 2023-06-02 DIAGNOSIS — Z91.81 HISTORY OF FALL: ICD-10-CM

## 2023-06-02 DIAGNOSIS — S09.90XD TRAUMATIC INJURY OF HEAD, SUBSEQUENT ENCOUNTER: ICD-10-CM

## 2023-06-02 DIAGNOSIS — S01.81XD FACIAL LACERATION, SUBSEQUENT ENCOUNTER: Primary | ICD-10-CM

## 2023-06-02 DIAGNOSIS — M81.0 OSTEOPOROSIS, UNSPECIFIED OSTEOPOROSIS TYPE, UNSPECIFIED PATHOLOGICAL FRACTURE PRESENCE: ICD-10-CM

## 2023-06-02 PROCEDURE — 99214 OFFICE O/P EST MOD 30 MIN: CPT | Mod: S$PBB,,, | Performed by: NURSE PRACTITIONER

## 2023-06-02 PROCEDURE — 99214 PR OFFICE/OUTPT VISIT, EST, LEVL IV, 30-39 MIN: ICD-10-PCS | Mod: S$PBB,,, | Performed by: NURSE PRACTITIONER

## 2023-06-02 PROCEDURE — 99999 PR PBB SHADOW E&M-EST. PATIENT-LVL III: CPT | Mod: PBBFAC,,, | Performed by: NURSE PRACTITIONER

## 2023-06-02 PROCEDURE — 99999 PR PBB SHADOW E&M-EST. PATIENT-LVL III: ICD-10-PCS | Mod: PBBFAC,,, | Performed by: NURSE PRACTITIONER

## 2023-06-02 PROCEDURE — 99213 OFFICE O/P EST LOW 20 MIN: CPT | Mod: PBBFAC | Performed by: NURSE PRACTITIONER

## 2023-06-02 NOTE — PROGRESS NOTES
[facility] Note    Chief Complaint      Chief Complaint   Patient presents with    Follow-up     History of Present Illness      Onelia Duarte is a 73 y.o. female patient of Dr. Ring with chronic conditions of hypothyroid, osteoporosis, HLD who is new to me and presents today for f/u from ER visit at [facility] for a fall on 05/29/2023 hitting her left forehead.  Patient reports he is feeling much better, no dizziness, no syncope, no confusion, headache or visual changes    Patient comes to appointment by herself  No physical therapy or further testing needed  No , or any other needs at this time    ER note:  presenting with a fall. Patient reports a mechanical trip and fall she did hit her head. She denies any loss conscious. She denies being on any blood thinners. Family member reports she has had some mild oozing from the wound. Patient denies any focal new onset numbness, weakness, tingling.. No other specific aggravating or relieving factors otherwise      Medications   Tdap (ADACEL) syringe 0.5 mL (0.5 mLs Intramuscular Given 5/29/23 1008)      CT Head wo Contrast   Final Result   Normal noncontrast head CT. No acute intracranial injury or calvarial fracture.      CT Cervical Spine wo IV Contrast   Final Result   CT cervical spine negative for fracture or subluxation.   Cervical spondylosis.     MDM   This is an emergent evaluation of a 73 y.o. year old who presents to the emergency department with a fall.  Previous records were queried and reviewed. Previous records were reviewed, patient's office note from 02/01/2023 was reviewed. Patient has a history of hypothyroidism. Not noted to be on any blood thinners.  Differential Diagnosis includes but is not limited to: ICH, TBI, Concussion, Cervical spine fx, Scalp contusion.   Pulse oximetry obtained and was:97 % % on room air, I interpreted that as normal.    Imaging: I independently interpreted the CT images which  showed no ICH.    Pt presenting to the ER for evaluation of a ground level fall. Pt sustained a reported mechanical trip and fall. Pt did hit her head and has evidence of a scalp contusion. Pt is not on blood thinners. Pt does not have any significant known coagulopathy. Upon presentation pt was generally well appearing with no new focal neuro deficits on exam. No signs of lynch sign. EOMI no signs of entrapment. No significant midline cervical or spine tenderness. No chest wall tenderness, CTAB. Abd soft without tenderness. Moving all extremities without hesitation or range of motion difficulty. CT was performed of the head and neck to evaluate for TBI/Cervical injury. Pt has been observed in the ER and has not displayed any signs of deterioration. Given the pt has no acute surgical changes on CT and has a reassuring neuro exam, I have a low suspicion for occult ICH/TBI that would necessitate surgical intervention. Pt is at their mental baseline. Pts hx not consistent with seizures or syncope.      The pt has remained hemodynamically stable throughout the entire ED visit and is likely without objective evidence for an acute process requiring emergent intervention or hospitalization. Regardless, an unremarkable evaluation in the ED does not preclude the development or presence of a serious or life threatening condition. As such, patient was instructed to return immediately for any worsening or change in current symptoms. I discussed test results and provided counseling to patient and/or family with regard to condition, the treatment plan, specific conditions for return (including, but not limited to, worsening symptoms), and the importance of close follow up. We discussed, if applicable any lab/radiographic abnormalities that specifically need to be addressed with primary care and followed up. We have discussed the possible need for additional subspecialist evaluation in addition to primary care. Answered all  questions at this time. The patient is stable for discharge.     Pt understands and is in agreement with plan. Pt is able to repeat plan, and verbalize understanding; able to verbalize and repeat reasons to return to the ER.       Health Maintenance   Topic Date Due    Hepatitis C Screening  Never done    Mammogram  03/27/2024    DEXA Scan  03/25/2025    Lipid Panel  12/27/2027    TETANUS VACCINE  12/19/2030       Past Medical History:   Diagnosis Date    Osteoporosis     Seizures        Past Surgical History:   Procedure Laterality Date    COLONOSCOPY  07/24/2019    Diverticulosis in the sigmoid and the ascending colon. Repeat in 5 years.    EYE SURGERY  2010 (estimate)    FRACTURE SURGERY  9/19/2022    Pelvic and scapula fractures    HYSTERECTOMY      KNEE SURGERY Right 2019    TONSILLECTOMY  1955       family history includes Cancer in her father; Hearing loss in her mother.    Social History     Tobacco Use    Smoking status: Never    Smokeless tobacco: Never   Substance Use Topics    Alcohol use: Never    Drug use: Never       Review of Systems   Constitutional:  Negative for chills and fever.   HENT:  Negative for congestion, sinus pain and sore throat.    Eyes:  Negative for blurred vision.   Respiratory:  Negative for cough, shortness of breath and wheezing.    Cardiovascular:  Negative for chest pain, palpitations and leg swelling.   Gastrointestinal:  Negative for abdominal pain, constipation, diarrhea, nausea and vomiting.   Genitourinary:  Negative for dysuria.   Musculoskeletal:  Negative for myalgias.   Skin:  Negative for rash.   Neurological:  Negative for dizziness, weakness and headaches.   Psychiatric/Behavioral:  Negative for depression. The patient is not nervous/anxious.       Outpatient Encounter Medications as of 6/2/2023   Medication Sig Dispense Refill    ascorbic acid (VITAMIN C) 1000 MG tablet Take 1,000 mg by mouth once daily.      calcium citrate-vitamin D3 315-200 mg (CITRACAL+D)  315-200 mg-unit per tablet Take 1 tablet by mouth 2 (two) times daily.      cholecalciferol, vitamin D3, 1,000 unit capsule Take 1,000 Units by mouth once daily.      clonazePAM (KLONOPIN) 2 MG Tab Take 1 tablet (2 mg total) by mouth 2 (two) times daily. 60 tablet 5    cyanocobalamin (VITAMIN B-12) 1000 MCG tablet Take 100 mcg by mouth once daily.      lamoTRIgine (LAMICTAL) 200 MG tablet TAKE 1 TABLET BY MOUTH TWICE A  tablet 3    levETIRAcetam (KEPPRA) 750 MG Tab TAKE 1 TABLET (750 MG TOTAL) BY MOUTH 4 (FOUR) TIMES DAILY. 540 tablet 2    levothyroxine (SYNTHROID) 50 MCG tablet TAKE 1 TABLET BY MOUTH EVERY DAY 90 tablet 3    PROLIA 60 mg/mL Syrg        No facility-administered encounter medications on file as of 6/2/2023.        Review of patient's allergies indicates:   Allergen Reactions    Codeine Nausea And Vomiting    Doxycycline hyclate Nausea And Vomiting    Oxycodone-acetaminophen Nausea And Vomiting    Percodan lakeshia Nausea And Vomiting       Physical Exam      Vital Signs  Pulse: 96  SpO2: 99 %  BP: 128/70  BP Location: Right arm  Patient Position: Sitting  Height and Weight  Weight: 58.2 kg (128 lb 4.9 oz)    Physical Exam  Vitals and nursing note reviewed.   Constitutional:       General: She is not in acute distress.     Appearance: Normal appearance. She is well-developed. She is not ill-appearing.   HENT:      Head: Normocephalic. Left periorbital erythema and laceration present.        Right Ear: External ear normal.      Left Ear: External ear normal.   Eyes:      Conjunctiva/sclera: Conjunctivae normal.      Pupils: Pupils are equal, round, and reactive to light.   Neck:      Thyroid: No thyromegaly.      Vascular: No JVD.      Trachea: No tracheal deviation.   Cardiovascular:      Rate and Rhythm: Normal rate and regular rhythm.      Heart sounds: Normal heart sounds. No murmur heard.  Pulmonary:      Effort: Pulmonary effort is normal.      Breath sounds: Normal breath sounds.   Chest:       Chest wall: No tenderness.   Abdominal:      General: Bowel sounds are normal.      Palpations: Abdomen is soft.      Tenderness: There is no abdominal tenderness. There is no guarding.   Musculoskeletal:         General: Normal range of motion.      Cervical back: Normal range of motion and neck supple.   Lymphadenopathy:      Cervical: No cervical adenopathy.   Skin:     General: Skin is warm and dry.      Findings: Bruising present.      Comments: End stage bruising to left forehead and around orbital area.left eye perrla, no conjunctivitis or redness to sclera   Neurological:      General: No focal deficit present.      Mental Status: She is alert and oriented to person, place, and time.      Sensory: No sensory deficit.      Motor: No weakness.      Gait: Gait normal.   Psychiatric:         Mood and Affect: Mood normal.         Behavior: Behavior normal.         Thought Content: Thought content normal.         Judgment: Judgment normal.        Laboratory:  CBC:  Lab Results   Component Value Date    WBC 6.2 12/27/2022    RBC 3.60 (L) 12/27/2022    HGB 11.5 (L) 12/27/2022    HCT 33.9 (L) 12/27/2022     12/27/2022    MCV 94.3 12/27/2022    MCH 31.9 12/27/2022    MCHC 33.8 12/27/2022    MCHC 34.0 10/27/2021    MCHC 32.0 07/14/2021     CMP:  Lab Results   Component Value Date    GLU 95 12/27/2022    CALCIUM 9.7 12/27/2022    ALBUMIN 4.4 12/27/2022    PROT 6.7 12/27/2022     12/27/2022    K 4.5 12/27/2022    CO2 30 12/27/2022     12/27/2022    BUN 21.0 12/27/2022    ALKPHOS 112 12/27/2022    ALT 17 12/27/2022    AST 22 12/27/2022    BILITOT 0.5 12/27/2022    BILITOT 0.6 10/27/2021    BILITOT 0.8 07/14/2021     URINALYSIS:  Lab Results   Component Value Date    COLORU YELLOW 11/08/2021    SPECGRAV 1.010 11/08/2021    PHUR 6.5 11/08/2021    PROTEINUA NEGATIVE 11/08/2021    GLUCOSEU NEGATIVE 11/08/2021    NITRITE NEGATIVE 11/08/2021    LEUKOCYTESUR TRACE (A) 11/08/2021      LIPIDS:  Lab Results    Component Value Date    TSH 2.79 12/27/2022    TSH 1.92 10/27/2021    HDL 88 (H) 12/27/2022    HDL 88 (A) 12/27/2022    HDL 83 (H) 10/27/2021    CHOL 179 12/27/2022    CHOL 179 12/27/2022    CHOL 166 10/27/2021    TRIG 75 12/27/2022    TRIG 75 12/27/2022    TRIG 55 10/27/2021    LDLCALC 79 12/27/2022    LDLCALC 79 12/27/2022    LDLCALC 74 10/27/2021    NONHDLCHOL 83 10/27/2021     TSH:  Lab Results   Component Value Date    TSH 2.79 12/27/2022    TSH 1.92 10/27/2021     A1C:  No results found for: HGBA1C      Assessment/Plan     Onelia Duarte is a 73 y.o.female with:    Facial laceration, subsequent encounter    Traumatic injury of head, subsequent encounter    History of fall    Osteoporosis, unspecified osteoporosis type, unspecified pathological fracture presence          Health Maintenance Due   Topic Date Due    Hepatitis C Screening  Never done    Shingles Vaccine (1 of 2) Never done    COVID-19 Vaccine (5 - Pfizer series) 02/04/2023    Colorectal Cancer Screening  08/11/2023      Complete any antibiotics or medications given by ER  Return to emergency room for any worsening of pain vision headache confusion dizziness or feelings of syncope      I spent 32 minutes on the day of this encounter for preparing for, evaluating, treating, and managing this patient.      -Continue current medications and maintain follow up with specialists.  Return to clinic in Follow up if symptoms worsen or fail to improve.       REBECA Dtuta  [facility]

## 2023-06-20 ENCOUNTER — OFFICE VISIT (OUTPATIENT)
Dept: NEUROLOGY | Facility: CLINIC | Age: 73
End: 2023-06-20
Payer: MEDICARE

## 2023-06-20 VITALS
HEART RATE: 82 BPM | DIASTOLIC BLOOD PRESSURE: 78 MMHG | SYSTOLIC BLOOD PRESSURE: 119 MMHG | WEIGHT: 125.56 LBS | BODY MASS INDEX: 20.92 KG/M2 | HEIGHT: 65 IN

## 2023-06-20 DIAGNOSIS — G40.209 PARTIAL EPILEPSY WITH IMPAIRMENT OF CONSCIOUSNESS: Primary | ICD-10-CM

## 2023-06-20 DIAGNOSIS — G40.209 COMPLEX PARTIAL SEIZURES WITH CONSCIOUSNESS IMPAIRED: ICD-10-CM

## 2023-06-20 PROCEDURE — 99213 OFFICE O/P EST LOW 20 MIN: CPT | Mod: PBBFAC | Performed by: PSYCHIATRY & NEUROLOGY

## 2023-06-20 PROCEDURE — 99999 PR PBB SHADOW E&M-EST. PATIENT-LVL III: CPT | Mod: PBBFAC,,, | Performed by: PSYCHIATRY & NEUROLOGY

## 2023-06-20 PROCEDURE — 99999 PR PBB SHADOW E&M-EST. PATIENT-LVL III: ICD-10-PCS | Mod: PBBFAC,,, | Performed by: PSYCHIATRY & NEUROLOGY

## 2023-06-20 PROCEDURE — 99214 PR OFFICE/OUTPT VISIT, EST, LEVL IV, 30-39 MIN: ICD-10-PCS | Mod: S$PBB,,, | Performed by: PSYCHIATRY & NEUROLOGY

## 2023-06-20 PROCEDURE — 99214 OFFICE O/P EST MOD 30 MIN: CPT | Mod: S$PBB,,, | Performed by: PSYCHIATRY & NEUROLOGY

## 2023-06-20 NOTE — PROGRESS NOTES
"Name: Onelia Duarte  MRN: [id number]   CSN: [id number]      Date: 06/20/2023    HISTORY OF PRESENT ILLNESS (HPI)  The patient is a 73 y.o. presents for follow up for epilepsy    Onset was at age 12 and she had single generalized convulsion prior to Erica in the setting of medication changes.     Per MILLER'Mara 2018:   "She has had a long history of partial complex seizures characterized by transient alteration in consciousness with amnesia and occasional repetitive movements affecting the hands and mouth. She had an MRI scan of the brain done in 1992 that was normal. An EEG done at that time showed evidence of right frontal sharp focus. She had been doing well with a combination of Keppra, Lamictal and clonazepam and has had no spells in over a year.  In the past her seizures have been brief and were usually triggered by stress at work.  She is now retired.  She does report that she has had occasional episodes when she feels she might have had a spells but this passes.  This has been very infrequent usually triggered by excessive exertion on stress.  She was last seen by me 2 years ago and denies any new medical problems otherwise.  She came to the clinic accompanied by her .  She is planned to her vacation to [place] next month."      Interim History    Fall lat month when she tripped in her home with forehead laceration but no concussive symptoms, recovered well    1/2023  Fall last September with pelvic fx but recovered well following rehab stay, changed CLZ to 2mg bid during rehab stay due to dose confusion but tolerating well  2/2022  No further seizures, ongoing follow up regarding scoliosis and back pain  7/2021  No further seizures, stressor of  passing but coping well  3/2020  No further auras  10/2019  Continues with occasional episodes of transient staring for a few seconds followed by 1-2 minutes confusion, patient and  estimate this happens about 4/year with no escalation in " frequency in recent years.  She has never driven.    ED visits  Episodes of SE  Change in meds    Seizure Seminology  Seizure Type 1  Classification:   Aura -   Ictus  - Nonconv -  - Conv -  - Duration -   Post-ictal  - Symptoms  - Duration  Age of onset   Current Seizure Frequency -    Last Seizure      sz per month 2019 2020   Kamran     Feb     Mar     Apr     May 1    Kj     Jul     Aug     Sep     Oct     Nov     Dec     Tot       Seizure Triggers  Sleep Deprivation -  None  Other medications -  None   Benadral   Tramadol   Other  Psych/stress -  None  Photic stimulation -  None  Hyperventilation -  None  Medical Problems -  None  Menses -   No  Sensory Stimulation    Light  No   Sound  No   Problem Solv No   Other  No  Missed dose of Rx None    AED Treatments  Present regimen  lamotrigine (Lamictal, LTG) 200mg bid  levetiracetam (Keppra, LEV) 750mg qid  clonazepam (Klonopin, CZP) 2mg bid    Prior treatments  phenytoin (Dilantin, PHT)  primidone (Mysoline, PRM)  Not tried  acetazolamide (Diamox, AZM)  Amantadine  brivitiracetam (Briviact, BRV)  carbamazepine (Tegretol, CBZ)  clobazam (Onfi or Frizium, CLB)  ethosuximide (Zarontin, ESM)  eslicarbazine (Aptiom, ESL)  felbamate (felbatol, FBM)  gabapentin (Neurontin, GPN)  lacosamide (Vimpat, LCS)   methsuximide (Celontin, MSM)  oxcarbazepine (Trileptal OXC)  perampanel (Fycompa, FCP)   phenobarbital (Pb)  pregabalin (Lyrica, PGB)  rufinamide (Banzel, RUF)  tiagabine (Gabatril,  TGB)  topiramate (Topamax, TPM)  viagabatrin, (Sabril, VGB)  vagal nerve stimulator (VNS)  valproic acid (Depakote, VPA)  zonisamide (Zonegran, ZNA)  Benzodiazepines  diazepam - rectal (Diastatl)  diazepam - oral (Valium, DZ)  clorazepate (Tranxene, CLZ)  Ativan  Brain Stimulation  Vagal Nerve Stimulation-n/a  DBS- n/a    Adherence/Compliance method  Memory - yes  Mom or Spouse - Yes  Pill Box - no  Bird calendar - no  Turn over medication bottle - no  Phone alarm - no    Seizure  Evaluation  EEG Routine -   EEG Ambulatory -   EEG\Video Monitoring -   MRI/MRA -   CT/CTA Scan -   PET Scan -   Neuropsychological evaluation -   DEXA Scan    Potential Epilepsy Risk Factors:   Pregnancy/Labor/Delivery - full term uncomplicated pregnancy labor and vaginal delivery  Febrile seizures - none  Head injury  - none  CNS infection - none     Stroke - none  Family Hx of Sz - none    PAST MEDICAL HISTORY:   Active Ambulatory Problems     Diagnosis Date Noted    Partial epilepsy with impairment of consciousness 07/29/2012    Osteoporosis     Hypothyroidism (acquired) 06/17/2020    Hyperlipidemia, mixed 02/01/2023     Resolved Ambulatory Problems     Diagnosis Date Noted    No Resolved Ambulatory Problems     Past Medical History:   Diagnosis Date    Seizures         PAST SURGICAL HISTORY:   Past Surgical History:   Procedure Laterality Date    COLONOSCOPY  07/24/2019    Diverticulosis in the sigmoid and the ascending colon. Repeat in 5 years.    EYE SURGERY  2010 (estimate)    FRACTURE SURGERY  9/19/2022    Pelvic and scapula fractures    HYSTERECTOMY      KNEE SURGERY Right 2019    TONSILLECTOMY  1955        FAMILY HISTORY:   Family History   Problem Relation Age of Onset    Hearing loss Mother     Cancer Father          SOCIAL HISTORY:   Social History     Socioeconomic History    Marital status:    Tobacco Use    Smoking status: Never    Smokeless tobacco: Never   Substance and Sexual Activity    Alcohol use: Never    Drug use: Never    Sexual activity: Not Currently     Partners: Male     Birth control/protection: Post-menopausal     Comment:         SUBSTANCE USE:  Social History     Tobacco Use    Smoking status: Never    Smokeless tobacco: Never   Substance and Sexual Activity    Alcohol use: Never    Drug use: Never    Sexual activity: Not Currently     Partners: Male     Birth control/protection: Post-menopausal     Comment:       Social History     Tobacco Use    Smoking  "status: Never    Smokeless tobacco: Never   Substance Use Topics    Alcohol use: Never        ALLERGIES: Codeine, Doxycycline hyclate, Oxycodone-acetaminophen, and Percodan lakeshia     /78   Pulse 82   Ht 5' 5" (1.651 m)   Wt 56.9 kg (125 lb 8.8 oz)   BMI 20.89 kg/m²     Higher Cortical Function:    Patient is a well developed, pleasant, well groomed individual appearing their stated age  Oriented - intact to person, place and time and followed two step instruction correctly.    Fund of knowledge was appropriate.    R-L Orientation - Intact  Language - Speech was fluent without evidence for an aphasia.    Cranial Nerves II - XII:    EOMs were intact with normal smooth and no nystagmus.    PERRLA. D/C  Visual fields were full to confrontation.    Motor - facial movement was symmetrical and normal.    Facial sensory - Light touch and pin prick sensations were normal.    Hearing was normal to finger rub.  Palate moved well and was symmetrical with normal palatal and oral sensation.    Tongue movement was full   Normal power and bulk was found in the massiter and rotator muscles of the neck.  Motor: Power, bulk and tone were normal in all extremities.  Sensory: Light touch, pin prick, vibration and position senses were normal in all extremities.    Coordination:       Rapid alternating movements and rapid finger tapping - normal.       Finger to nose - nl.       Arm roll - symmetrical.    Gait:  Station, gait walking were done without difficulty and Romberg was negative.  Tremor: resting, postural, intentional - none  Pulses    Peripheral - strong and symmetrical      IMPRESSION  1. Likely CLRE    DISPOSITION:   Continue current regiment    Return 6-12 months    Greater than 30 minutes spent in chart review, documentation, independent review of imaging, and face to face time with patient    2014 EPILEPSY QUALITY MEASUREMENT (AAN)  1 a. Seizure Frequency: noted  1b. Seizure Intervention: yes  2.                   " a. Etiology: unknown  b. Seizure Type: focal w sec GTC sz  c. Epilepsy Syndrome: n/a  3.                   a. Side-effects of AED: no                        b. Intervention for side-effects: n/a  4. Screening for psychiatric or behavioral disorders: yes  5. Personalized epilepsy safety issues and education: yes  6. Counseling for women of child-bearing potential and epilepsy: n/a  7. Referral of treatment-resistant epilepsy to comprehensive epilepsy center (q 2 years): N/A.     The patient was asked to call me/the clinic 1 week after the test(s) are done to obtain results.  The following issues were  all discussed in detail with the patient and family/caregiver(s):     1. Diagnosis, plans, prognosis, medications and possible side-effects, risks and benefits of treatment, other alternatives to AEDs.  2. Risks related to continued seizures, status epilepticus, SUDEP, driving restrictions and seizure precautions ( no baths but showers are ok, no swimming unsupervised, no use of heavy machinery, no use of sharp moving objects, avoid heights).   3. Issues related to pregnancy, OCP and breast feeding as it relates to epilepsy.  4. The potential of teratogenicity and suicidal risks of anti-epileptic medications.  5.Avoid any activity that compromise patient safety related to seizures.      Questions and concerns raised by the patient and family/care-giver(s) were addressed and they indicated understanding of everything discussed and agreed to plans as above.

## 2023-06-21 DIAGNOSIS — G40.209 COMPLEX PARTIAL SEIZURES WITH CONSCIOUSNESS IMPAIRED: ICD-10-CM

## 2023-06-21 RX ORDER — LAMOTRIGINE 200 MG/1
200 TABLET ORAL 2 TIMES DAILY
Qty: 180 TABLET | Refills: 3 | Status: SHIPPED | OUTPATIENT
Start: 2023-06-21

## 2023-06-21 RX ORDER — CLONAZEPAM 2 MG/1
2 TABLET ORAL 2 TIMES DAILY
Qty: 60 TABLET | Refills: 5 | Status: SHIPPED | OUTPATIENT
Start: 2023-06-21 | End: 2023-08-01

## 2023-06-22 RX ORDER — LEVETIRACETAM 750 MG/1
TABLET ORAL
Qty: 360 TABLET | Refills: 3 | Status: SHIPPED | OUTPATIENT
Start: 2023-06-22

## 2023-08-01 DIAGNOSIS — G40.209 COMPLEX PARTIAL SEIZURES WITH CONSCIOUSNESS IMPAIRED: ICD-10-CM

## 2023-08-01 RX ORDER — CLONAZEPAM 2 MG/1
2 TABLET ORAL 2 TIMES DAILY
Qty: 60 TABLET | Refills: 5 | Status: SHIPPED | OUTPATIENT
Start: 2023-08-01 | End: 2024-02-10

## 2023-08-01 NOTE — TELEPHONE ENCOUNTER
----- Message from Sailaja Lorenzo sent at 8/1/2023  9:53 AM CDT -----  Regarding: Refill  Contact: Pt [phone]  Pt is calling to refill Rx: clonazePAM (KLONOPIN) 2 MG Tab 60 tablet please call       [facility]/pharmacy #[id number] - [place] - [address]  [address]  [place] [address]  Phone: [phone] Fax: [phone]

## 2023-08-18 ENCOUNTER — PATIENT MESSAGE (OUTPATIENT)
Dept: NEUROLOGY | Facility: CLINIC | Age: 73
End: 2023-08-18
Payer: MEDICARE

## 2023-12-03 DIAGNOSIS — Z71.89 COMPLEX CARE COORDINATION: ICD-10-CM

## 2024-01-02 DIAGNOSIS — E03.9 HYPOTHYROIDISM (ACQUIRED): ICD-10-CM

## 2024-01-02 RX ORDER — LEVOTHYROXINE SODIUM 50 UG/1
50 TABLET ORAL DAILY
Qty: 90 TABLET | Refills: 0 | Status: SHIPPED | OUTPATIENT
Start: 2024-01-02

## 2024-01-02 NOTE — TELEPHONE ENCOUNTER
No care due was identified.  [facility] Embedded Care Due Messages. Reference number: [id number].   1/02/2024 11:17:27 AM CST

## 2024-01-24 ENCOUNTER — OFFICE VISIT (OUTPATIENT)
Dept: PRIMARY CARE CLINIC | Facility: CLINIC | Age: 74
End: 2024-01-24
Payer: MEDICARE

## 2024-01-24 VITALS
OXYGEN SATURATION: 95 % | SYSTOLIC BLOOD PRESSURE: 110 MMHG | WEIGHT: 128.31 LBS | DIASTOLIC BLOOD PRESSURE: 80 MMHG | BODY MASS INDEX: 21.38 KG/M2 | HEART RATE: 59 BPM | HEIGHT: 65 IN

## 2024-01-24 DIAGNOSIS — E03.9 HYPOTHYROIDISM (ACQUIRED): ICD-10-CM

## 2024-01-24 DIAGNOSIS — M41.9 SCOLIOSIS, UNSPECIFIED SCOLIOSIS TYPE, UNSPECIFIED SPINAL REGION: ICD-10-CM

## 2024-01-24 DIAGNOSIS — M81.0 OSTEOPOROSIS, UNSPECIFIED OSTEOPOROSIS TYPE, UNSPECIFIED PATHOLOGICAL FRACTURE PRESENCE: ICD-10-CM

## 2024-01-24 DIAGNOSIS — E78.2 HYPERLIPIDEMIA, MIXED: Primary | ICD-10-CM

## 2024-01-24 DIAGNOSIS — G40.209 PARTIAL EPILEPSY WITH IMPAIRMENT OF CONSCIOUSNESS: ICD-10-CM

## 2024-01-24 DIAGNOSIS — Z79.899 ENCOUNTER FOR LONG-TERM CURRENT USE OF MEDICATION: ICD-10-CM

## 2024-01-24 DIAGNOSIS — M40.209 KYPHOSIS, UNSPECIFIED KYPHOSIS TYPE, UNSPECIFIED SPINAL REGION: ICD-10-CM

## 2024-01-24 DIAGNOSIS — Z78.0 POSTMENOPAUSAL: ICD-10-CM

## 2024-01-24 PROCEDURE — 99999 PR PBB SHADOW E&M-EST. PATIENT-LVL V: CPT | Mod: PBBFAC,,, | Performed by: INTERNAL MEDICINE

## 2024-01-24 PROCEDURE — 99215 OFFICE O/P EST HI 40 MIN: CPT | Mod: PBBFAC | Performed by: INTERNAL MEDICINE

## 2024-01-24 PROCEDURE — 99214 OFFICE O/P EST MOD 30 MIN: CPT | Mod: S$PBB,,, | Performed by: INTERNAL MEDICINE

## 2024-01-24 NOTE — PROGRESS NOTES
[facility] Note    Chief Complaint      Chief Complaint   Patient presents with    Annual Exam       History of Present Illness      Onelia Duarte is a 73 y.o. female with chronic conditions of Hypothyroidism, epilepsy, osteoporosis  who presents today for: follow up chronic conditions.  Hypothyroidism: Controlled on synthroid.  TSH due.    Epilepsy: Controlled on lamotrigine, keppra, clonazepam.  Sees Dr. Thomas. No recent seizure.  Osteoporosis: Controlled on prolia.  DEXA UTD.  Flu shot UTD.  TDAP 2020. Pneumovax UTD. COVID vaccine UTD.  Mammogram 2023, Dr. Sheets.  DEXA 3/2022, Dr. Sheets.  Cscope 2023, Dr. Michelle, no polyps, 5 yr interval for previous hx of polyps.      Past Medical History:  Past Medical History:   Diagnosis Date    Osteoporosis     Seizures        Past Surgical History:   has a past surgical history that includes Hysterectomy; Colonoscopy (07/24/2019); Knee surgery (Right, 2019); Eye surgery (2010 (estimate)); Fracture surgery (9/19/2022); and Tonsillectomy (1955).    Family History:  family history includes Cancer in her father; Hearing loss in her mother.     Social History:  Social History     Tobacco Use    Smoking status: Never    Smokeless tobacco: Never   Substance Use Topics    Alcohol use: Never    Drug use: Never       I personally reviewed all past medical, surgical, social and family history.    Review of Systems   Constitutional:  Negative for chills, fever and malaise/fatigue.   Respiratory:  Negative for shortness of breath.    Cardiovascular:  Negative for chest pain.   Gastrointestinal:  Negative for constipation, diarrhea, nausea and vomiting.   Skin:  Negative for rash.   Neurological:  Negative for weakness.   All other systems reviewed and are negative.       Medications:  Outpatient Encounter Medications as of 1/24/2024   Medication Sig Dispense Refill    ascorbic acid (VITAMIN C) 1000 MG tablet Take 1,000 mg by mouth once daily.       calcium citrate-vitamin D3 315-200 mg (CITRACAL+D) 315-200 mg-unit per tablet Take 1 tablet by mouth 2 (two) times daily.      cholecalciferol, vitamin D3, 1,000 unit capsule Take 1,000 Units by mouth once daily.      clonazePAM (KLONOPIN) 2 MG Tab TAKE 1 TABLET BY MOUTH 2 TIMES DAILY. 60 tablet 5    cyanocobalamin (VITAMIN B-12) 1000 MCG tablet Take 100 mcg by mouth once daily.      lamoTRIgine (LAMICTAL) 200 MG tablet Take 1 tablet (200 mg total) by mouth 2 (two) times daily. 180 tablet 3    levETIRAcetam (KEPPRA) 750 MG Tab TAKE 1 TABLET BY MOUTH 4 TIMES DAILY. 360 tablet 3    levothyroxine (SYNTHROID) 50 MCG tablet Take 1 tablet (50 mcg total) by mouth once daily. 90 tablet 0    PROLIA 60 mg/mL Syrg       [DISCONTINUED] levothyroxine (SYNTHROID) 50 MCG tablet TAKE 1 TABLET BY MOUTH EVERY DAY 90 tablet 3     No facility-administered encounter medications on file as of 1/24/2024.       Allergies:  Review of patient's allergies indicates:   Allergen Reactions    Codeine Nausea And Vomiting    Doxycycline hyclate Nausea And Vomiting    Oxycodone-acetaminophen Nausea And Vomiting    Percodan lakeshia Nausea And Vomiting       Health Maintenance:  Immunization History   Administered Date(s) Administered    COVID-19, MRNA, LN-S, PF (Pfizer) (Purple Cap) 02/26/2021, 03/19/2021, 10/06/2021    COVID-19, mRNA, LNP-S, bivalent booster, PF (PFIZER OMICRON) 10/04/2022    Influenza (FLUAD) - Quadrivalent - Adjuvanted - PF *Preferred* (65+) 10/07/2020, 10/26/2021, 09/02/2023    Influenza - High Dose - PF (65 years and older) 12/05/2017, 08/21/2018, 11/08/2019, 10/07/2020, 10/03/2022    Influenza - Trivalent - PF (ADULT) 10/06/2015, 12/28/2016    PPD Test 10/03/2022    Pneumococcal Conjugate - 13 Valent 07/30/2018    Pneumococcal Polysaccharide - 23 Valent 12/06/2009, 07/06/2017    Tdap 12/19/2020, 05/29/2023      Health Maintenance   Topic Date Due    Hepatitis C Screening  Never done    Shingles Vaccine (1 of 2) Never done     "Mammogram  03/27/2024    DEXA Scan  03/25/2025    Lipid Panel  12/27/2027    Colorectal Cancer Screening  08/11/2032    TETANUS VACCINE  05/29/2033        Physical Exam      Vital Signs  Pulse: (!) 59  SpO2: 95 %  BP: 110/80  BP Location: Right arm  Patient Position: Sitting  Pain Score: 0-No pain  Height and Weight  Height: 5' 5" (165.1 cm)  Weight: 58.2 kg (128 lb 4.9 oz)  BSA (Calculated - sq m): 1.63 sq meters  BMI (Calculated): 21.4  Weight in (lb) to have BMI = 25: 149.9]    Physical Exam  Vitals reviewed.   Constitutional:       Appearance: She is well-developed.   HENT:      Head: Normocephalic and atraumatic.      Right Ear: External ear normal.      Left Ear: External ear normal.   Cardiovascular:      Rate and Rhythm: Normal rate and regular rhythm.      Heart sounds: Normal heart sounds. No murmur heard.  Pulmonary:      Effort: Pulmonary effort is normal.      Breath sounds: Normal breath sounds. No wheezing or rales.   Abdominal:      General: Bowel sounds are normal. There is no distension.      Palpations: Abdomen is soft.      Tenderness: There is no abdominal tenderness.          Laboratory:  CBC:  Recent Labs   Lab 09/23/22  0456 09/28/22  0455 12/27/22  0946   WBC 8.8 6.4 6.2   RBC  --   --  3.60 L   Hemoglobin 10.4 L 10.6 L 11.5 L   Hematocrit 30.0 L 30.9 L 33.9 L   Platelets  --   --  275   MCV 97.5 96.2 94.3   MCH 33.7 H 33.0 31.9   MCHC 34.5 34.3 33.8     CMP:  Recent Labs   Lab 07/14/21  1124 07/14/21  1124 10/27/21  0000 12/27/22  0946   Glucose 88  --  57 L 95   Calcium 10.8 H  --  9.8 9.7   Albumin 4.2  --   --   --    ALBUMIN  --    < > 4.5 4.4   Total Protein 7.5   < > 6.6 6.7   Sodium 139  --  140 139   Potassium 4.4  --  4.9 4.5   CO2 31 H  --  27 30   Chloride 100  --  103 100   BUN 11   < > 15 21.0   Alkaline Phosphatase 97  --  85 112   ALT 16  --  16 17   AST 23  --  20 22   Total Bilirubin 0.8  --  0.6 0.5    < > = values in this interval not displayed.     URINALYSIS:  Recent " Labs   Lab 11/08/21  1009   Color, UA YELLOW   Specific Gravity, UA 1.010   pH, UA 6.5   Protein, UA NEGATIVE   Glucose, UA NEGATIVE   Nitrite, UA NEGATIVE   Leukocytes, UA TRACE A      LIPIDS:  Recent Labs   Lab 10/27/21  0000 12/27/22  0000 12/27/22  0946   TSH 1.92  --  2.79   HDL 83 H 88 A 88 H   Cholesterol 166 179 179   Triglycerides 55 75 75   LDL Calculated 74 79 79   Non-HDL Cholesterol 83  --   --      TSH:  Recent Labs   Lab 10/27/21  0000 12/27/22  0946   TSH 1.92 2.79     A1C:        Assessment/Plan     Onelia Duarte is a 73 y.o.female with:    1. Hyperlipidemia, mixed  Continue current meds.    2. Hypothyroidism (acquired)  Continue current meds.    3. Partial epilepsy with impairment of consciousness  Continue current meds.    4. Osteoporosis, unspecified osteoporosis type, unspecified pathological fracture presence  Continue current meds.    5. Encounter for long-term current use of medication       Chronic conditions status updated as per HPI.  Other than changes above, cont current medications and maintain follow up with specialists.  Follow up in about 6 months (around 7/24/2024) for Follow up visit.    No future appointments.      Jostin Downing MD  [facility]

## 2024-01-24 NOTE — PROGRESS NOTES
Chief Complaint  Chief Complaint   Patient presents with    Annual Exam       HPI  Onelia Duarte is a 73 y.o. female with chronic conditions of Hypothyroidism, epilepsy, osteoporosis  who presents today for: follow up chronic conditions.  Hypothyroidism: Controlled on synthroid.  TSH due.    Epilepsy: Controlled on lamotrigine, keppra, clonazepam.  Sees Dr. Thomas. No recent seizure.  Osteoporosis: Controlled on prolia.  DEXA 2022  Scoliosis: newly diagnosed. Concerned about body habitus  Flu shot UTD.  TDAP 2020. Pneumovax UTD. COVID vaccine UTD.  Mammogram 2023, Dr. Sheets.  DEXA 3/2022, Dr. Sheets.  Cscope 2023, Dr. Michelle, no polyps, 10 yr interval for previous hx of polyps      PAST MEDICAL HISTORY:  Past Medical History:   Diagnosis Date    Osteoporosis     Seizures        PAST SURGICAL HISTORY:  Past Surgical History:   Procedure Laterality Date    COLONOSCOPY  07/24/2019    Diverticulosis in the sigmoid and the ascending colon. Repeat in 5 years.    EYE SURGERY  2010 (estimate)    FRACTURE SURGERY  9/19/2022    Pelvic and scapula fractures    HYSTERECTOMY      KNEE SURGERY Right 2019    TONSILLECTOMY  1955       SOCIAL HISTORY:  Social History     Socioeconomic History    Marital status:    Tobacco Use    Smoking status: Never    Smokeless tobacco: Never   Substance and Sexual Activity    Alcohol use: Never    Drug use: Never    Sexual activity: Not Currently     Partners: Male     Birth control/protection: Post-menopausal     Comment:        FAMILY HISTORY:  Family History   Problem Relation Age of Onset    Hearing loss Mother     Cancer Father        ALLERGIES AND MEDICATIONS: updated and reviewed.  Review of patient's allergies indicates:   Allergen Reactions    Codeine Nausea And Vomiting    Doxycycline hyclate Nausea And Vomiting    Oxycodone-acetaminophen Nausea And Vomiting    Percodan lakeshia Nausea And Vomiting     Current Outpatient Medications   Medication Sig Dispense  "Refill    ascorbic acid (VITAMIN C) 1000 MG tablet Take 1,000 mg by mouth once daily.      calcium citrate-vitamin D3 315-200 mg (CITRACAL+D) 315-200 mg-unit per tablet Take 1 tablet by mouth 2 (two) times daily.      cholecalciferol, vitamin D3, 1,000 unit capsule Take 1,000 Units by mouth once daily.      clonazePAM (KLONOPIN) 2 MG Tab TAKE 1 TABLET BY MOUTH 2 TIMES DAILY. 60 tablet 5    cyanocobalamin (VITAMIN B-12) 1000 MCG tablet Take 100 mcg by mouth once daily.      lamoTRIgine (LAMICTAL) 200 MG tablet Take 1 tablet (200 mg total) by mouth 2 (two) times daily. 180 tablet 3    levETIRAcetam (KEPPRA) 750 MG Tab TAKE 1 TABLET BY MOUTH 4 TIMES DAILY. 360 tablet 3    levothyroxine (SYNTHROID) 50 MCG tablet Take 1 tablet (50 mcg total) by mouth once daily. 90 tablet 0    PROLIA 60 mg/mL Syrg        No current facility-administered medications for this visit.         ROS  Review of Systems   Constitutional:  Negative for chills and fever.   HENT: Negative.     Eyes: Negative.    Respiratory:  Negative for shortness of breath and wheezing.    Cardiovascular:  Negative for chest pain.   Gastrointestinal: Negative.    Endocrine: Negative.    Genitourinary: Negative.    Musculoskeletal: Negative.    Skin: Negative.    Allergic/Immunologic: Negative.    Neurological: Negative.    Hematological: Negative.    Psychiatric/Behavioral: Negative.             Physical Exam  Vitals:    01/24/24 1323   BP: 110/80   BP Location: Right arm   Patient Position: Sitting   BP Method: Medium (Manual)   Pulse: (!) 59   SpO2: 95%   Weight: 58.2 kg (128 lb 4.9 oz)   Height: 5' 5" (1.651 m)    Body mass index is 21.35 kg/m².  Weight: 58.2 kg (128 lb 4.9 oz)   Height: 5' 5" (165.1 cm)   Physical Exam  Constitutional:       Appearance: Normal appearance.   HENT:      Head: Normocephalic and atraumatic.      Nose: Nose normal.   Eyes:      Extraocular Movements: Extraocular movements intact.      Conjunctiva/sclera: Conjunctivae normal. "   Cardiovascular:      Rate and Rhythm: Normal rate and regular rhythm.      Pulses: Normal pulses.      Heart sounds: Normal heart sounds.   Pulmonary:      Effort: Pulmonary effort is normal.      Breath sounds: Normal breath sounds.   Abdominal:      General: Abdomen is flat.      Palpations: Abdomen is soft.   Musculoskeletal:         General: Normal range of motion.      Cervical back: Normal range of motion.   Skin:     General: Skin is warm and dry.      Capillary Refill: Capillary refill takes less than 2 seconds.   Neurological:      General: No focal deficit present.      Mental Status: She is alert and oriented to person, place, and time.   Psychiatric:         Mood and Affect: Mood normal.         Behavior: Behavior normal.           Health Maintenance         Date Due Completion Date    Hepatitis C Screening Never done ---    Shingles Vaccine (1 of 2) Never done ---    RSV Vaccine (Age 60+ and Pregnant patients) (1 - 1-dose 60+ series) Never done ---    COVID-19 Vaccine (5 - 2023-24 season) 09/01/2023 10/4/2022    Mammogram 03/27/2024 3/27/2023    DEXA Scan 03/25/2025 3/25/2022    Lipid Panel 12/27/2027 12/27/2022    Colorectal Cancer Screening 08/11/2032 8/11/2022    TETANUS VACCINE 05/29/2033 5/29/2023              Assessment and Plan:  Hyperlipidemia, mixed  -Continue current medications  -Labs due  Hypothyroidism (acquired)  -Continue current medications  -Labs due  Partial epilepsy with impairment of consciousness  -Continue current medications  Osteoporosis, unspecified osteoporosis type, unspecified pathological fracture presence  -Continue current medications  -DEXA due  Scoliosis  -referral to spine specialist    Mammogram due     Julia Hinkle MS4

## 2024-02-05 DIAGNOSIS — M41.9 SCOLIOSIS, UNSPECIFIED SCOLIOSIS TYPE, UNSPECIFIED SPINAL REGION: Primary | ICD-10-CM

## 2024-02-08 DIAGNOSIS — G40.209 COMPLEX PARTIAL SEIZURES WITH CONSCIOUSNESS IMPAIRED: ICD-10-CM

## 2024-02-10 RX ORDER — CLONAZEPAM 2 MG/1
2 TABLET ORAL 2 TIMES DAILY
Qty: 60 TABLET | Refills: 5 | Status: SHIPPED | OUTPATIENT
Start: 2024-02-10

## 2024-02-27 DIAGNOSIS — Z00.00 ENCOUNTER FOR MEDICARE ANNUAL WELLNESS EXAM: ICD-10-CM

## 2024-04-23 ENCOUNTER — TELEPHONE (OUTPATIENT)
Dept: PRIMARY CARE CLINIC | Facility: CLINIC | Age: 74
End: 2024-04-23
Payer: MEDICARE

## 2024-04-23 NOTE — TELEPHONE ENCOUNTER
----- Message from Rachael Haro sent at 4/23/2024  1:48 PM CDT -----  Contact: [phone] pt  Caller is requesting an earlier appointment then we can schedule.  Caller is requesting a message be sent to the provider.  If this is for urgent care symptoms, did you offer other providers at this location, providers at other locations, or [facility]? (yes, no, n/a): yes   If this is for the patients physical, did you offer to schedule next available and put on wait list, or to see NP or PA for their physical?  (yes, no, n/a):  yes  When is the next available appointment with their provider:  august  Reason for the appointment:  high calcium per her obgyn   Patient preference of timeframe to be scheduled:    Would the patient like a call back, or a response through their [facility] portal?:  callback  Comments:  she perfer a morning appt;

## 2024-04-23 NOTE — TELEPHONE ENCOUNTER
Pt currently scheduled for 5/20/24. Offered NP appointment, pt refused and prefers to wait until appt to see Dr. Downing. Appt placed on wait list.

## 2024-05-06 ENCOUNTER — TELEPHONE (OUTPATIENT)
Dept: PRIMARY CARE CLINIC | Facility: CLINIC | Age: 74
End: 2024-05-06
Payer: MEDICARE

## 2024-05-06 NOTE — TELEPHONE ENCOUNTER
----- Message from Angelia Woods sent at 5/6/2024  9:50 AM CDT -----  Contact: [phone] Patient  Caller is requesting an earlier appointment then we can schedule.  Caller is requesting a message be sent to the provider.  If this is for urgent care symptoms, did you offer other providers at this location, providers at other locations, or [facility]? (yes, no, n/a):    If this is for the patients physical, did you offer to schedule next available and put on wait list, or to see NP or PA for their physical?  (yes, no, n/a):  No  When is the next available appointment with their provider:  09/04/2024  Reason for the appointment:  removal of stitches  Patient preference of timeframe to be scheduled:  Friday 05/10/2024  Would the patient like a call back, or a response through their [facility] portal?:   Call Back Please. Thank you  Comments:

## 2024-05-10 ENCOUNTER — OFFICE VISIT (OUTPATIENT)
Dept: PRIMARY CARE CLINIC | Facility: CLINIC | Age: 74
End: 2024-05-10
Payer: MEDICARE

## 2024-05-10 VITALS
HEIGHT: 65 IN | HEART RATE: 95 BPM | WEIGHT: 122.81 LBS | SYSTOLIC BLOOD PRESSURE: 122 MMHG | DIASTOLIC BLOOD PRESSURE: 68 MMHG | OXYGEN SATURATION: 97 % | BODY MASS INDEX: 20.46 KG/M2

## 2024-05-10 DIAGNOSIS — Z48.02 VISIT FOR SUTURE REMOVAL: Primary | ICD-10-CM

## 2024-05-10 PROCEDURE — 99213 OFFICE O/P EST LOW 20 MIN: CPT | Mod: PBBFAC | Performed by: NURSE PRACTITIONER

## 2024-05-10 PROCEDURE — 99213 OFFICE O/P EST LOW 20 MIN: CPT | Mod: S$PBB,,, | Performed by: NURSE PRACTITIONER

## 2024-05-10 PROCEDURE — 99999 PR PBB SHADOW E&M-EST. PATIENT-LVL III: CPT | Mod: PBBFAC,,, | Performed by: NURSE PRACTITIONER

## 2024-05-10 NOTE — PROGRESS NOTES
[facility] Note    Chief Complaint      Chief Complaint   Patient presents with    Suture / Staple Removal       History of Present Illness      Onelia Duarte is a 74 y.o. female with chronic conditions of osteoporosis, seizures, who presents today for: here for ER follow up. Had a trip and fall on 5/5 and hit chin. Did ct of head, unremarkable. Placed 5 sutures to chin. Pt denies any fever or chills. Denies drainage. No falls since.   Tdap UTD.    Past Medical History:  Past Medical History:   Diagnosis Date    Osteoporosis     Seizures        Past Surgical History:   has a past surgical history that includes Hysterectomy; Colonoscopy (07/24/2019); Knee surgery (Right, 2019); Eye surgery (2010 (estimate)); Fracture surgery (9/19/2022); and Tonsillectomy (1955).    Family History:  family history includes Cancer in her father; Hearing loss in her mother.     Social History:  Social History     Tobacco Use    Smoking status: Never    Smokeless tobacco: Never   Substance Use Topics    Alcohol use: Never    Drug use: Never       Review of Systems   Constitutional:  Negative for chills and fever.   Respiratory:  Negative for cough and shortness of breath.    Cardiovascular:  Negative for chest pain and palpitations.   Gastrointestinal:  Negative for constipation, diarrhea, nausea and vomiting.   Genitourinary:  Negative for dysuria and hematuria.   Musculoskeletal:  Negative for falls.   Neurological:  Negative for headaches.        Medications:  Outpatient Encounter Medications as of 5/10/2024   Medication Sig Dispense Refill    ascorbic acid (VITAMIN C) 1000 MG tablet Take 1,000 mg by mouth once daily.      calcium citrate-vitamin D3 315-200 mg (CITRACAL+D) 315-200 mg-unit per tablet Take 1 tablet by mouth 2 (two) times daily.      cholecalciferol, vitamin D3, 1,000 unit capsule Take 1,000 Units by mouth once daily.      clonazePAM (KLONOPIN) 2 MG Tab TAKE 1 TABLET BY MOUTH TWICE A DAY 60  "tablet 5    cyanocobalamin (VITAMIN B-12) 1000 MCG tablet Take 100 mcg by mouth once daily.      lamoTRIgine (LAMICTAL) 200 MG tablet Take 1 tablet (200 mg total) by mouth 2 (two) times daily. 180 tablet 3    levETIRAcetam (KEPPRA) 750 MG Tab TAKE 1 TABLET BY MOUTH 4 TIMES DAILY. 360 tablet 3    levothyroxine (SYNTHROID) 50 MCG tablet Take 1 tablet (50 mcg total) by mouth once daily. 90 tablet 0    PROLIA 60 mg/mL Syrg        No facility-administered encounter medications on file as of 5/10/2024.       Allergies:  Review of patient's allergies indicates:   Allergen Reactions    Codeine Nausea And Vomiting    Doxycycline hyclate Nausea And Vomiting    Oxycodone-acetaminophen Nausea And Vomiting    Percodan lakeshia Nausea And Vomiting       Health Maintenance:  Immunization History   Administered Date(s) Administered    COVID-19, MRNA, LN-S, PF (Pfizer) (Purple Cap) 02/26/2021, 03/19/2021, 10/06/2021    COVID-19, mRNA, LNP-S, bivalent booster, PF (PFIZER OMICRON) 10/04/2022    Influenza (FLUAD) - Quadrivalent - Adjuvanted - PF *Preferred* (65+) 10/07/2020, 10/26/2021, 09/02/2023    Influenza - High Dose - PF (65 years and older) 12/05/2017, 08/21/2018, 11/08/2019, 10/07/2020, 10/03/2022    Influenza - Trivalent - PF (ADULT) 10/06/2015, 12/28/2016    PPD Test 10/03/2022    Pneumococcal Conjugate - 13 Valent 07/30/2018    Pneumococcal Polysaccharide - 23 Valent 12/06/2009, 07/06/2017    RSVpreF (Arexvy) 04/12/2024    Tdap 12/19/2020, 05/29/2023      Health Maintenance   Topic Date Due    Hepatitis C Screening  Never done    Shingles Vaccine (1 of 2) Never done    Mammogram  03/27/2024    DEXA Scan  03/25/2025    Lipid Panel  01/29/2029    Colorectal Cancer Screening  08/11/2032    TETANUS VACCINE  05/29/2033        Physical Exam      Vital Signs  Pulse: 95  SpO2: 97 %  BP: 122/68  BP Location: Left arm  Patient Position: Sitting  Pain Score: 0-No pain  Height and Weight  Height: 5' 5" (165.1 cm)  Weight: 55.7 kg (122 lb " 12.7 oz)  BSA (Calculated - sq m): 1.6 sq meters  BMI (Calculated): 20.4  Weight in (lb) to have BMI = 25: 149.9]    Physical Exam  Constitutional:       Appearance: She is well-developed.   HENT:      Head: Normocephalic and atraumatic.   Cardiovascular:      Rate and Rhythm: Normal rate and regular rhythm.      Heart sounds: Normal heart sounds. No murmur heard.  Pulmonary:      Effort: Pulmonary effort is normal. No respiratory distress.      Breath sounds: Normal breath sounds.   Abdominal:      General: There is no distension.      Palpations: Abdomen is soft.      Tenderness: There is no abdominal tenderness. There is no guarding.   Skin:     General: Skin is warm and dry.      Findings: Bruising (noted to left side of chin and neck) present.      Comments: 5 sutures removed using aseptic technique from chin, edges well apprx. No surrounding redness or erythema. Steri-strips placed X3.    Neurological:      General: No focal deficit present.      Mental Status: She is alert. Mental status is at baseline.   Psychiatric:         Behavior: Behavior normal.          Laboratory:  CBC:  Recent Labs   Lab 09/28/22  0455 12/27/22  0946 01/29/24  1014   WBC 6.4 6.2 6.4   RBC  --  3.60 L  --    Hemoglobin 10.6 L 11.5 L 11.0 L   Hematocrit 30.9 L 33.9 L 32.4 L   Platelets  --  275  --    MCV 96.2 94.3 96.7   MCH 33.0 31.9 32.7   MCHC 34.3 33.8 33.8     CMP:  Recent Labs   Lab 07/14/21  1124 07/14/21  1124 10/27/21  0000 12/27/22  0946   Glucose 88  --  57 L 95   Calcium 10.8 H  --  9.8 9.7   Albumin 4.2  --   --   --    ALBUMIN  --    < > 4.5 4.4   Total Protein 7.5   < > 6.6 6.7   Sodium 139  --  140 139   Potassium 4.4  --  4.9 4.5   CO2 31 H  --  27 30   Chloride 100  --  103 100   BUN 11   < > 15 21.0   Alkaline Phosphatase 97  --  85 112   ALT 16  --  16 17   AST 23  --  20 22   Total Bilirubin 0.8  --  0.6 0.5    < > = values in this interval not displayed.     URINALYSIS:  Recent Labs   Lab 11/08/21  1009   Color,  UA YELLOW   Specific Gravity, UA 1.010   pH, UA 6.5   Protein, UA NEGATIVE   Glucose, UA NEGATIVE   Nitrite, UA NEGATIVE   Leukocytes, UA TRACE A      LIPIDS:  Recent Labs   Lab 10/27/21  0000 12/27/22  0000 12/27/22  0946 01/29/24  1014   TSH 1.92  --  2.79 2.63   HDL 83 H 88 A 88 H 80 H   Cholesterol 166 179 179 154   Triglycerides 55 75 75 91   LDL Calculated 74 79 79 56   Hdl/Cholesterol Ratio  --   --   --  1.93   Non-HDL Cholesterol 83  --   --  74     TSH:  Recent Labs   Lab 10/27/21  0000 12/27/22  0946 01/29/24  1014   TSH 1.92 2.79 2.63     A1C:        Assessment/Plan     Onelia Duarte is a 74 y.o.female with:    1. Visit for suture removal  Pt tolerated well. Discussed signs and symptoms of infection  Keep area clean and dry  Steri-strips will fall off in 5-7 days  RTC as needed.      Chronic conditions status updated as per HPI.  Other than changes above, cont current medications and maintain follow up with specialists.  No follow-ups on file.    Future Appointments   Date Time Provider Department Center   5/20/2024  3:30 PM Jostin Downing MD [facility] [place]   7/29/2024  1:45 PM Klibert, David M., MD [facility] [place] Adreinne Cotaya, FNP [facility]

## 2024-05-20 ENCOUNTER — OFFICE VISIT (OUTPATIENT)
Dept: PRIMARY CARE CLINIC | Facility: CLINIC | Age: 74
End: 2024-05-20
Payer: MEDICARE

## 2024-05-20 VITALS
BODY MASS INDEX: 20.94 KG/M2 | DIASTOLIC BLOOD PRESSURE: 70 MMHG | WEIGHT: 125.69 LBS | SYSTOLIC BLOOD PRESSURE: 130 MMHG | HEIGHT: 65 IN | HEART RATE: 81 BPM | OXYGEN SATURATION: 95 %

## 2024-05-20 DIAGNOSIS — G40.209 PARTIAL EPILEPSY WITH IMPAIRMENT OF CONSCIOUSNESS: ICD-10-CM

## 2024-05-20 DIAGNOSIS — E78.2 HYPERLIPIDEMIA, MIXED: Primary | ICD-10-CM

## 2024-05-20 DIAGNOSIS — E03.9 HYPOTHYROIDISM (ACQUIRED): ICD-10-CM

## 2024-05-20 DIAGNOSIS — M81.0 OSTEOPOROSIS, UNSPECIFIED OSTEOPOROSIS TYPE, UNSPECIFIED PATHOLOGICAL FRACTURE PRESENCE: ICD-10-CM

## 2024-05-20 DIAGNOSIS — E83.52 HYPERCALCEMIA: ICD-10-CM

## 2024-05-20 PROCEDURE — 99213 OFFICE O/P EST LOW 20 MIN: CPT | Mod: PBBFAC | Performed by: INTERNAL MEDICINE

## 2024-05-20 PROCEDURE — 99999 PR PBB SHADOW E&M-EST. PATIENT-LVL III: CPT | Mod: PBBFAC,,, | Performed by: INTERNAL MEDICINE

## 2024-05-20 PROCEDURE — 99214 OFFICE O/P EST MOD 30 MIN: CPT | Mod: S$PBB,,, | Performed by: INTERNAL MEDICINE

## 2024-05-20 NOTE — PROGRESS NOTES
[facility] Note    Chief Complaint      Chief Complaint   Patient presents with    Follow-up       History of Present Illness      Onelia Duarte is a 74 y.o. female with chronic conditions of hypothyroidism, epilepsy, osteoporosis who presents today for: follow up chronic conditions.  Since last visit, pt had a mechanical fall at home and lacerated her chin.  Had suture repair.    Had recent labs showing elevated calcium.  On a calcium supplement for bone strength.    Hypothyroidism: Controlled on synthroid.  TSH 2.63.    Epilepsy: Controlled on lamotrigine, keppra, clonazepam.  Sees Dr. Thomas. No recent seizure.  Osteoporosis: Controlled on prolia.  DEXA UTD.  Flu shot UTD.  TDAP 2020. Pneumovax UTD. COVID vaccine UTD.  Mammogram 2023, Dr. Sheets.  DEXA 3/2022, Dr. Sheets.  Cscope 2023, Dr. Michelle, no polyps, 5 yr interval for previous hx of polyps.      Past Medical History:  Past Medical History:   Diagnosis Date    Osteoporosis     Seizures        Past Surgical History:   has a past surgical history that includes Hysterectomy; Colonoscopy (07/24/2019); Knee surgery (Right, 2019); Eye surgery (2010 (estimate)); Fracture surgery (9/19/2022); and Tonsillectomy (1955).    Family History:  family history includes Cancer in her father; Hearing loss in her mother.     Social History:  Social History     Tobacco Use    Smoking status: Never    Smokeless tobacco: Never   Substance Use Topics    Alcohol use: Never    Drug use: Never       I personally reviewed all past medical, surgical, social and family history.    Review of Systems   Constitutional:  Negative for chills, fever and malaise/fatigue.   Respiratory:  Negative for shortness of breath.    Cardiovascular:  Negative for chest pain.   Gastrointestinal:  Negative for constipation, diarrhea, nausea and vomiting.   Skin:  Negative for rash.   Neurological:  Negative for weakness.   All other systems reviewed and are negative.        Medications:  Outpatient Encounter Medications as of 5/20/2024   Medication Sig Dispense Refill    ascorbic acid (VITAMIN C) 1000 MG tablet Take 1,000 mg by mouth once daily.      calcium citrate-vitamin D3 315-200 mg (CITRACAL+D) 315-200 mg-unit per tablet Take 1 tablet by mouth 2 (two) times daily.      clonazePAM (KLONOPIN) 2 MG Tab TAKE 1 TABLET BY MOUTH TWICE A DAY 60 tablet 5    cyanocobalamin (VITAMIN B-12) 1000 MCG tablet Take 100 mcg by mouth once daily.      lamoTRIgine (LAMICTAL) 200 MG tablet Take 1 tablet (200 mg total) by mouth 2 (two) times daily. 180 tablet 3    levETIRAcetam (KEPPRA) 750 MG Tab TAKE 1 TABLET BY MOUTH 4 TIMES DAILY. 360 tablet 3    levothyroxine (SYNTHROID) 50 MCG tablet Take 1 tablet (50 mcg total) by mouth once daily. 90 tablet 0    PROLIA 60 mg/mL Syrg       [DISCONTINUED] cholecalciferol, vitamin D3, 1,000 unit capsule Take 1,000 Units by mouth once daily.       No facility-administered encounter medications on file as of 5/20/2024.       Allergies:  Review of patient's allergies indicates:   Allergen Reactions    Codeine Nausea And Vomiting    Doxycycline hyclate Nausea And Vomiting    Oxycodone-acetaminophen Nausea And Vomiting    Percodan lakeshia Nausea And Vomiting       Health Maintenance:  Immunization History   Administered Date(s) Administered    COVID-19, MRNA, LN-S, PF (Pfizer) (Purple Cap) 02/26/2021, 03/19/2021, 10/06/2021    COVID-19, mRNA, LNP-S, bivalent booster, PF (PFIZER OMICRON) 10/04/2022    Influenza (FLUAD) - Quadrivalent - Adjuvanted - PF *Preferred* (65+) 10/07/2020, 10/26/2021, 09/02/2023    Influenza - High Dose - PF (65 years and older) 12/05/2017, 08/21/2018, 11/08/2019, 10/07/2020, 10/03/2022    Influenza - Trivalent - PF (ADULT) 10/06/2015, 12/28/2016    PPD Test 10/03/2022    Pneumococcal Conjugate - 13 Valent 07/30/2018    Pneumococcal Polysaccharide - 23 Valent 12/06/2009, 07/06/2017    RSVpreF (Arexvy) 04/12/2024    Tdap 12/19/2020, 05/29/2023  "     Health Maintenance   Topic Date Due    Hepatitis C Screening  Never done    Shingles Vaccine (1 of 2) Never done    DEXA Scan  03/25/2025    Mammogram  05/15/2025    Lipid Panel  01/29/2029    Colorectal Cancer Screening  08/11/2032    TETANUS VACCINE  05/29/2033        Physical Exam      Vital Signs  Pulse: 81  SpO2: 95 %  BP: 130/70  BP Location: Left arm  Patient Position: Sitting  Pain Score: 0-No pain  Height and Weight  Height: 5' 5" (165.1 cm)  Weight: 57 kg (125 lb 10.6 oz)  BSA (Calculated - sq m): 1.62 sq meters  BMI (Calculated): 20.9  Weight in (lb) to have BMI = 25: 149.9]    Physical Exam  Vitals reviewed.   Constitutional:       Appearance: She is well-developed.   HENT:      Head: Normocephalic and atraumatic.      Right Ear: External ear normal.      Left Ear: External ear normal.   Cardiovascular:      Rate and Rhythm: Normal rate and regular rhythm.      Heart sounds: Normal heart sounds. No murmur heard.  Pulmonary:      Effort: Pulmonary effort is normal.      Breath sounds: Normal breath sounds. No wheezing or rales.   Abdominal:      General: Bowel sounds are normal. There is no distension.      Palpations: Abdomen is soft.      Tenderness: There is no abdominal tenderness.          Laboratory:  CBC:  Recent Labs   Lab 09/28/22  0455 12/27/22  0946 01/29/24  1014   WBC 6.4 6.2 6.4   RBC  --  3.60 L  --    Hemoglobin 10.6 L 11.5 L 11.0 L   Hematocrit 30.9 L 33.9 L 32.4 L   Platelets  --  275  --    MCV 96.2 94.3 96.7   MCH 33.0 31.9 32.7   MCHC 34.3 33.8 33.8     CMP:  Recent Labs   Lab 07/14/21  1124 07/14/21  1124 10/27/21  0000 12/27/22  0946   Glucose 88  --  57 L 95   Calcium 10.8 H  --  9.8 9.7   Albumin 4.2  --   --   --    ALBUMIN  --    < > 4.5 4.4   Total Protein 7.5   < > 6.6 6.7   Sodium 139  --  140 139   Potassium 4.4  --  4.9 4.5   CO2 31 H  --  27 30   Chloride 100  --  103 100   BUN 11   < > 15 21.0   Alkaline Phosphatase 97  --  85 112   ALT 16  --  16 17   AST 23  --  20 " 22   Total Bilirubin 0.8  --  0.6 0.5    < > = values in this interval not displayed.     URINALYSIS:  Recent Labs   Lab 11/08/21  1009   Color, UA YELLOW   Specific Gravity, UA 1.010   pH, UA 6.5   Protein, UA NEGATIVE   Glucose, UA NEGATIVE   Nitrite, UA NEGATIVE   Leukocytes, UA TRACE A      LIPIDS:  Recent Labs   Lab 10/27/21  0000 12/27/22  0000 12/27/22  0946 01/29/24  1014   TSH 1.92  --  2.79 2.63   HDL 83 H 88 A 88 H 80 H   Cholesterol 166 179 179 154   Triglycerides 55 75 75 91   LDL Calculated 74 79 79 56   Hdl/Cholesterol Ratio  --   --   --  1.93   Non-HDL Cholesterol 83  --   --  74     TSH:  Recent Labs   Lab 10/27/21  0000 12/27/22  0946 01/29/24  1014   TSH 1.92 2.79 2.63     A1C:        Assessment/Plan     Onelia Duarte is a 74 y.o.female with:    1. Hyperlipidemia, mixed  Continue current meds.    2. Hypothyroidism (acquired)  Continue current meds.    3. Partial epilepsy with impairment of consciousness  Continue current meds.  F/U with neurology.   4. Osteoporosis, unspecified osteoporosis type, unspecified pathological fracture presence  Continue current meds.    5. Hypercalcemia  - Comprehensive Metabolic Panel; Future       Chronic conditions status updated as per HPI.  Other than changes above, cont current medications and maintain follow up with specialists.  No follow-ups on file.    Future Appointments   Date Time Provider Department Center   7/29/2024  1:45 PM Jostin Downing MD [facility]       Jostin Downing MD  [facility]

## 2024-05-24 DIAGNOSIS — G40.209 COMPLEX PARTIAL SEIZURES WITH CONSCIOUSNESS IMPAIRED: ICD-10-CM

## 2024-05-24 RX ORDER — LEVETIRACETAM 750 MG/1
750 TABLET ORAL 4 TIMES DAILY
Qty: 360 TABLET | Refills: 0 | Status: SHIPPED | OUTPATIENT
Start: 2024-05-24

## 2024-05-27 ENCOUNTER — HOSPITAL ENCOUNTER (OUTPATIENT)
Dept: RADIOLOGY | Facility: HOSPITAL | Age: 74
Discharge: HOME | End: 2024-05-27
Attending: ORTHOPAEDIC SURGERY
Payer: MEDICARE

## 2024-05-27 DIAGNOSIS — M41.9 SCOLIOSIS, UNSPECIFIED SCOLIOSIS TYPE, UNSPECIFIED SPINAL REGION: ICD-10-CM

## 2024-05-27 PROCEDURE — 72082 X-RAY EXAM ENTIRE SPI 2/3 VW: CPT | Mod: TC

## 2024-05-27 PROCEDURE — 72082 X-RAY EXAM ENTIRE SPI 2/3 VW: CPT | Mod: 26,,, | Performed by: RADIOLOGY

## 2024-05-29 ENCOUNTER — TELEPHONE (OUTPATIENT)
Dept: NEUROLOGY | Facility: CLINIC | Age: 74
End: 2024-05-29
Payer: MEDICARE

## 2024-05-29 NOTE — TELEPHONE ENCOUNTER
Left message for patient to return my call. According to pharmacy she still has 1 month left on her Keppra so they will not refill it at this time.

## 2024-05-30 NOTE — PROGRESS NOTES
DATE: 6/11/2024  PATIENT: Onelia Duarte    Supervising Physician: Wally Morales M.D.    CHIEF COMPLAINT: abnormal gait    HISTORY:  Onelia Duarte is a 74 y.o. female here for initial evaluation of back pain (Back - 0). Denies back pain but states she often feels like she is tilting to her left side when walking. There is no associated numbness and tingling. There is no subjective weakness. Prior treatments have included PT 5 years ago, but no CHUCKY or surgery. She would not like to discuss surgery. She is here today for a brace.  The patient denies myelopathic symptoms such as handwriting changes or difficulty with buttons/coins/keys. Denies perineal paresthesias, bowel/bladder dysfunction.    PAST MEDICAL/SURGICAL HISTORY:  Past Medical History:   Diagnosis Date    Osteoporosis     Seizures      Past Surgical History:   Procedure Laterality Date    COLONOSCOPY  07/24/2019    Diverticulosis in the sigmoid and the ascending colon. Repeat in 5 years.    EYE SURGERY  2010 (estimate)    FRACTURE SURGERY  9/19/2022    Pelvic and scapula fractures    HYSTERECTOMY      KNEE SURGERY Right 2019    TONSILLECTOMY  1955       Current Medications:   Current Outpatient Medications:     calcium citrate-vitamin D3 315-200 mg (CITRACAL+D) 315-200 mg-unit per tablet, Take 1 tablet by mouth 2 (two) times daily., Disp: , Rfl:     clonazePAM (KLONOPIN) 2 MG Tab, TAKE 1 TABLET BY MOUTH TWICE A DAY, Disp: 60 tablet, Rfl: 5    cyanocobalamin (VITAMIN B-12) 1000 MCG tablet, Take 100 mcg by mouth once daily., Disp: , Rfl:     lamoTRIgine (LAMICTAL) 200 MG tablet, Take 1 tablet (200 mg total) by mouth 2 (two) times daily., Disp: 180 tablet, Rfl: 3    levETIRAcetam (KEPPRA) 750 MG Tab, Take 1 tablet (750 mg total) by mouth 4 (four) times daily. No further refill without appointment, Disp: 360 tablet, Rfl: 0    levothyroxine (SYNTHROID) 50 MCG tablet, Take 1 tablet (50 mcg total) by mouth once daily., Disp: 90 tablet, Rfl:  0    PROLIA 60 mg/mL Syrg, , Disp: , Rfl:     ascorbic acid (VITAMIN C) 1000 MG tablet, Take 1,000 mg by mouth once daily., Disp: , Rfl:     Social History:   Social History     Socioeconomic History    Marital status:    Tobacco Use    Smoking status: Never    Smokeless tobacco: Never   Substance and Sexual Activity    Alcohol use: Never    Drug use: Never    Sexual activity: Not Currently     Partners: Male     Birth control/protection: Post-menopausal     Comment:      Social Determinants of Health     Financial Resource Strain: Low Risk  (5/12/2024)    Received from [facility]    Overall Financial Resource Strain (CARDIA)     Difficulty of Paying Living Expenses: Not hard at all   Food Insecurity: No Food Insecurity (5/12/2024)    Received from [facility]    Hunger Vital Sign     Worried About Running Out of Food in the Last Year: Never true     Ran Out of Food in the Last Year: Never true   Transportation Needs: Unmet Transportation Needs (5/12/2024)    Received from [facility]    PRAPARE - Transportation     Lack of Transportation (Medical): Yes     Lack of Transportation (Non-Medical): No   Physical Activity: Inactive (5/12/2024)    Received from [facility]    Exercise Vital Sign     Days of Exercise per Week: 0 days     Minutes of Exercise per Session: 0 min   Stress: No Stress Concern Present (5/12/2024)    Received from [facility]    [employer] [place] [employer] - Occupational Stress Questionnaire     Feeling of Stress : Not at all   Housing Stability: Low Risk  (2/3/2024)    Housing Stability Vital Sign     Unable to Pay for Housing in the Last Year: No     Number of Places Lived in the Last Year: 1     Unstable Housing in the Last Year: No       REVIEW OF SYSTEMS:  Constitution: Negative. Negative for chills, fever and night sweats.   Cardiovascular: Negative for chest pain and syncope.   Respiratory: Negative for cough and shortness of breath.   Gastrointestinal: See HPI.  "Negative for nausea/vomiting. Negative for abdominal pain.  Genitourinary: See HPI. Negative for discoloration or dysuria.  Skin: Negative for dry skin, itching and rash.   Hematologic/Lymphatic: Negative for bleeding problem. Does not bruise/bleed easily.   Musculoskeletal: Negative for falls and muscle weakness.   Neurological: See HPI. No seizures.   Endocrine: Negative for polydipsia, polyphagia and polyuria.   Allergic/Immunologic: Negative for hives and persistent infections.    PHYSICAL EXAMINATION:    Ht 5' 5" (1.651 m)   Wt 54.5 kg (120 lb 2.4 oz)   BMI 19.99 kg/m²     General: The patient is a  74 y.o. female in no apparent distress, the patient is oriented to person, place and time.   Psych: Normal mood and affect  HEENT: Vision grossly intact, hearing intact to the spoken word.  Lungs: Respirations unlabored.  Gait: Normal station and gait, no difficulty with toe or heel walk.   Skin: Dorsal lumbar skin negative for rashes, lesions, hairy patches and surgical scars.  Range of motion: Lumbar range of motion is acceptable. There is mild lumbar tenderness to palpation.  Spinal Balance: Global saggital balance acceptable; coronal spinal balance favoring left side, significant for scoliosis and kyphosis.  Musculoskeletal: No pain with the range of motion of the bilateral hips. No trochanteric tenderness to palpation.  Vascular: Bilateral lower extremities warm and well perfused, Dorsalis pedis pulses 2+ bilaterally.  Neurological: Normal strength and tone in all major motor groups in the bilateral lower extremities. Normal sensation to light touch in the L2-S1 dermatomes bilaterally.  Deep tendon reflexes symmetric 2+ in the bilateral lower extremities.  Negative Babinski bilaterally.  Straight leg raise negative bilaterally.     IMAGING:   Today I personally reviewed AP, Lat upright scoli films that demonstrate significant osteopenia and dextroscoliosis.     Body mass index is 19.99 kg/m².    No results " "found for: "HGBA1C"      ASSESSMENT/PLAN:    Onelia was seen today for low-back pain, back pain and shoulder pain.    Diagnoses and all orders for this visit:    Juvenile idiopathic scoliosis of thoracolumbar region  -     Ambulatory referral/consult to Physical/Occupational Therapy; Future  -     Back/Cervical Brace For Home Use        Today we discussed at length all of the different treatment options including anti-inflammatories, acetaminophen, rest, ice, heat, physical therapy including strengthening and stretching exercises, home exercises, ROM, aerobic conditioning, aqua therapy, other modalities including ultrasound, massage, and dry needling, epidural steroid injections and finally surgical intervention.  brace ordered. PT orders placed. She may follow up as needed.         "

## 2024-06-11 ENCOUNTER — OFFICE VISIT (OUTPATIENT)
Dept: ORTHOPEDICS | Facility: CLINIC | Age: 74
End: 2024-06-11
Payer: MEDICARE

## 2024-06-11 VITALS — WEIGHT: 120.13 LBS | BODY MASS INDEX: 20.01 KG/M2 | HEIGHT: 65 IN

## 2024-06-11 DIAGNOSIS — M41.115 JUVENILE IDIOPATHIC SCOLIOSIS OF THORACOLUMBAR REGION: Primary | ICD-10-CM

## 2024-06-11 PROCEDURE — 99214 OFFICE O/P EST MOD 30 MIN: CPT | Mod: S$PBB,,, | Performed by: REGISTERED NURSE

## 2024-06-11 PROCEDURE — 99213 OFFICE O/P EST LOW 20 MIN: CPT | Mod: PBBFAC | Performed by: REGISTERED NURSE

## 2024-06-11 PROCEDURE — 99999 PR PBB SHADOW E&M-EST. PATIENT-LVL III: CPT | Mod: PBBFAC,,, | Performed by: REGISTERED NURSE

## 2024-06-20 ENCOUNTER — TELEPHONE (OUTPATIENT)
Dept: ORTHOPEDICS | Facility: CLINIC | Age: 74
End: 2024-06-20
Payer: MEDICARE

## 2024-06-20 NOTE — TELEPHONE ENCOUNTER
Attempt to contact patient regarding message. Left message stating that Azeb was out of clinic. Stated that she will return back on Monday morning and that she will give you a call to discuss the plan of care. Also left number for patient to return call back to [phone]. Thanks.

## 2024-07-03 DIAGNOSIS — Z71.89 COMPLEX CARE COORDINATION: ICD-10-CM

## 2024-07-08 NOTE — PROGRESS NOTES
"DATE: 7/18/2024  PATIENT: Onelia Duarte    Attending Physician: Wally Morales M.D.    HISTORY:  Onelia Duarte is a 74 y.o. female who returns to me today for follow up.  She was last seen by me 6/11/2024.  Today she is doing well but notes tilting to her left side when walking. She has not started PT yet. She has not received her back brace that I ordered yet. She is not sure she wants to seek care here at [facility].    EXAM:  Ht 5' 1" (1.549 m)   Wt 55.2 kg (121 lb 9.3 oz)   BMI 22.97 kg/m²   Stable.     IMAGING:    Today I personally re- reviewed AP, Lat and Flex/Ex  upright L-spine that demonstrate significant osteopenia and dextroscoliosis.    Body mass index is 22.97 kg/m².    No results found for: "HGBA1C"      ASSESSMENT/PLAN:    Onelia was seen today for low-back pain and back pain.    Diagnoses and all orders for this visit:    Juvenile idiopathic scoliosis of thoracolumbar region      She will follow up if she decides to go to PT in [facility]. I will give the brace order to DME again.      "

## 2024-07-15 ENCOUNTER — OFFICE VISIT (OUTPATIENT)
Dept: INTERNAL MEDICINE | Facility: CLINIC | Age: 74
End: 2024-07-15
Payer: MEDICARE

## 2024-07-15 VITALS
TEMPERATURE: 99 F | DIASTOLIC BLOOD PRESSURE: 86 MMHG | SYSTOLIC BLOOD PRESSURE: 138 MMHG | WEIGHT: 122.38 LBS | BODY MASS INDEX: 23.11 KG/M2 | HEART RATE: 82 BPM | RESPIRATION RATE: 18 BRPM | HEIGHT: 61 IN | OXYGEN SATURATION: 99 %

## 2024-07-15 DIAGNOSIS — Z00.00 ENCOUNTER FOR MEDICARE ANNUAL WELLNESS EXAM: Primary | ICD-10-CM

## 2024-07-15 DIAGNOSIS — E78.2 HYPERLIPIDEMIA, MIXED: ICD-10-CM

## 2024-07-15 DIAGNOSIS — G40.209 PARTIAL EPILEPSY WITH IMPAIRMENT OF CONSCIOUSNESS: ICD-10-CM

## 2024-07-15 DIAGNOSIS — E03.9 HYPOTHYROIDISM (ACQUIRED): ICD-10-CM

## 2024-07-15 DIAGNOSIS — M81.0 OSTEOPOROSIS WITHOUT CURRENT PATHOLOGICAL FRACTURE, UNSPECIFIED OSTEOPOROSIS TYPE: ICD-10-CM

## 2024-07-15 PROCEDURE — 99999 PR PBB SHADOW E&M-EST. PATIENT-LVL V: CPT | Mod: PBBFAC,,, | Performed by: NURSE PRACTITIONER

## 2024-07-15 PROCEDURE — 99215 OFFICE O/P EST HI 40 MIN: CPT | Mod: PBBFAC,PO | Performed by: NURSE PRACTITIONER

## 2024-07-15 PROCEDURE — G0439 PPPS, SUBSEQ VISIT: HCPCS | Mod: ,,, | Performed by: NURSE PRACTITIONER

## 2024-07-15 NOTE — PATIENT INSTRUCTIONS
Counseling and Referral of Other Preventative  (Italic type indicates deductible and co-insurance are waived)    Patient Name: Onelia Duarte  Today's Date: 7/15/2024    Health Maintenance       Date Due Completion Date    Hepatitis C Screening Never done ---    Shingles Vaccine (1 of 2) Never done ---    COVID-19 Vaccine (5 - 2023-24 season) 09/01/2023 10/4/2022    Influenza Vaccine (1) 09/01/2024 9/2/2023    DEXA Scan 03/25/2025 3/25/2022    Mammogram 05/15/2025 5/15/2024    Lipid Panel 01/29/2029 1/29/2024    Colorectal Cancer Screening 08/11/2032 8/11/2022    TETANUS VACCINE 05/29/2033 5/29/2023        No orders of the defined types were placed in this encounter.    The following information is provided to all patients.  This information is to help you find resources for any of the problems found today that may be affecting your health:                  Living healthy guide: www.[facility].[place].gov      Understanding Diabetes: www.diabetes.org      Eating healthy: www.cdc.gov/healthyweight      CDC home safety checklist: www.cdc.gov/steadi/patient.html      Agency on Aging: www.goea.[place].gov      Alcoholics anonymous (AA): www.aa.org      Physical Activity: www.martin.nih.gov/vi0ywvq      Tobacco use: www.quitwithusla.org

## 2024-07-15 NOTE — PROGRESS NOTES
"  Onelia Duarte presented for a  Medicare AWV and comprehensive Health Risk Assessment today. The following components were reviewed and updated:    Medical history  Family History  Social history  Allergies and Current Medications  Health Risk Assessment  Health Maintenance  Care Team         ** See Completed Assessments for Annual Wellness Visit within the encounter summary.**         The following assessments were completed:  Living Situation  CAGE  Depression Screening  Timed Get Up and Go  Whisper Test  Cognitive Function Screening- not done due to mobility impairment  Nutrition Screening  ADL Screening  PAQ Screening      Opioid documentation:      Patient does not have a current opioid prescription.        Vitals:    07/15/24 1300   BP: 138/86   BP Location: Left arm   Patient Position: Sitting   BP Method: Small (Manual)   Pulse: 82   Resp: 18   Temp: 98.5 °F (36.9 °C)   TempSrc: Temporal   SpO2: 99%   Weight: 55.5 kg (122 lb 5.7 oz)   Height: 5' 1" (1.549 m)     Body mass index is 23.12 kg/m².  Physical Exam  Vitals and nursing note reviewed.   Constitutional:       Appearance: She is well-developed.   HENT:      Head: Normocephalic and atraumatic.      Right Ear: External ear normal.      Left Ear: External ear normal.      Nose: Nose normal.   Eyes:      Pupils: Pupils are equal, round, and reactive to light.   Cardiovascular:      Rate and Rhythm: Normal rate and regular rhythm.      Heart sounds: Normal heart sounds.   Pulmonary:      Effort: Pulmonary effort is normal.      Breath sounds: Normal breath sounds.   Abdominal:      General: Bowel sounds are normal.      Palpations: Abdomen is soft.   Musculoskeletal:         General: Normal range of motion.      Cervical back: Normal range of motion.      Comments: Kyphosis    Skin:     General: Skin is warm and dry.   Neurological:      Mental Status: She is alert and oriented to person, place, and time.      Gait: Gait abnormal.           "     Diagnoses and health risks identified today and associated recommendations/orders:    1. Encounter for Medicare annual wellness exam  Health Maintenance updated   Records reviewed   Exam done   - Ambulatory Referral/Consult to Enhanced Annual Wellness Visit (eAWV)    2. Partial epilepsy with impairment of consciousness  Stable and chronic. Continue current medications. Followed by PCP.     3. Hyperlipidemia, mixed  Stable and chronic. Continue current medications. Followed by PCP.     4. Hypothyroidism (acquired)  Stable and chronic. Continue current medications. Followed by PCP.     5. Osteoporosis without current pathological fracture, unspecified osteoporosis type  Stable and chronic. Continue current medications. Followed by PCP.       Counseling and Referral of Other Preventative  (Italic type indicates deductible and co-insurance are waived)    Patient Name: Onelia Duarte  Today's Date: 7/15/2024    Health Maintenance         Date Due Completion Date    Hepatitis C Screening Never done ---    Shingles Vaccine (1 of 2) Never done ---    COVID-19 Vaccine (5 - 2023-24 season) 09/01/2023 10/4/2022    Influenza Vaccine (1) 09/01/2024 9/2/2023    DEXA Scan 03/25/2025 3/25/2022    Mammogram 05/15/2025 5/15/2024    Lipid Panel 01/29/2029 1/29/2024    Colorectal Cancer Screening 08/11/2032 8/11/2022    TETANUS VACCINE 05/29/2033 5/29/2023          No orders of the defined types were placed in this encounter.    Provided Onelia with a 5-10 year written screening schedule and personal prevention plan. Recommendations were developed using the USPSTF age appropriate recommendations. Education, counseling, and referrals were provided as needed. After Visit Summary printed and given to patient which includes a list of additional screenings\tests needed.    Follow up in about 2 weeks (around 7/29/2024) for PCP visit.    Elida Calloway NP      I offered to discuss advanced care planning, including how to pick a  person who would make decisions for you if you were unable to make them for yourself, called a health care power of , and what kind of decisions you might make such as use of life sustaining treatments such as ventilators and tube feeding when faced with a life limiting illness recorded on a living will that they will need to know. (How you want to be cared for as you near the end of your natural life)     X Patient is interested in learning more about how to make advanced directives.  I provided them paperwork and offered to discuss this with them.

## 2024-07-18 ENCOUNTER — OFFICE VISIT (OUTPATIENT)
Dept: ORTHOPEDICS | Facility: CLINIC | Age: 74
End: 2024-07-18
Payer: MEDICARE

## 2024-07-18 VITALS — HEIGHT: 61 IN | BODY MASS INDEX: 22.95 KG/M2 | WEIGHT: 121.56 LBS

## 2024-07-18 DIAGNOSIS — M41.115 JUVENILE IDIOPATHIC SCOLIOSIS OF THORACOLUMBAR REGION: Primary | ICD-10-CM

## 2024-07-18 PROCEDURE — 99999 PR PBB SHADOW E&M-EST. PATIENT-LVL III: CPT | Mod: PBBFAC,,, | Performed by: REGISTERED NURSE

## 2024-07-18 PROCEDURE — 99213 OFFICE O/P EST LOW 20 MIN: CPT | Mod: PBBFAC | Performed by: REGISTERED NURSE

## 2024-07-18 PROCEDURE — 99213 OFFICE O/P EST LOW 20 MIN: CPT | Mod: S$PBB,,, | Performed by: REGISTERED NURSE

## 2024-07-24 ENCOUNTER — CLINICAL SUPPORT (OUTPATIENT)
Dept: REHABILITATION | Facility: HOSPITAL | Age: 74
End: 2024-07-24
Payer: MEDICARE

## 2024-07-24 DIAGNOSIS — M41.115 JUVENILE IDIOPATHIC SCOLIOSIS OF THORACOLUMBAR REGION: ICD-10-CM

## 2024-07-24 DIAGNOSIS — Z74.09 IMPAIRED FUNCTIONAL MOBILITY, BALANCE, GAIT, AND ENDURANCE: Primary | ICD-10-CM

## 2024-07-24 PROCEDURE — 97161 PT EVAL LOW COMPLEX 20 MIN: CPT

## 2024-07-24 PROCEDURE — 97530 THERAPEUTIC ACTIVITIES: CPT

## 2024-07-24 NOTE — PROGRESS NOTES
[facility]   Physical Therapy Initial Evaluation      Name: Onelia Hernandez [facility] Number: [id number]    Therapy Diagnosis:   Encounter Diagnoses   Name Primary?    Juvenile idiopathic scoliosis of thoracolumbar region     Impaired functional mobility, balance, gait, and endurance Yes        Physician: BELEN Vera NP    Physician Orders: PT Eval and Treat  Medical Diagnosis from Referral: M41.115 (ICD-10-CM) - Juvenile idiopathic scoliosis of thoracolumbar region  Evaluation Date: 7/24/2024  Authorization Period Expiration: 06/11/2025  Plan of Care Expiration: 9/18/2024  Progress Note Due: 8/21/2024  Visit # / Visits authorized: 1/1   FOTO: 1/3    Precautions: Standard and Osteoporosis    Time In: 900  Time Out: 1000  Total Billable Time: 60 minutes    Subjective     Date of onset: 5-6 years     History of current condition - Onelia reports: she has scoliosis of her low back with excessive thoracic kyphosis. Her chief complaint is that when she is coming to a standing position or walking she feels off balance - leaning to her left. She has no pain and denies any numbness/tingling. She also reports having no difficulty or pain with quiet breathing or deep breaths. She had a fall on 1/30/2024 that resulted in an ORIF of her left olecranon. She was tying her shoes and fell onto her left elbow. Medical history is significant for seizures and osteoporosis. She is taking medications to control seizures and is on Vitamin D supplements and Prolia for osteoporosis.    Falls: 1/30/2024 and May 2024 (went to turn off her alarm clock and fell)    Imaging: X-ray scoliosis: Bones are significantly demineralized. Dextroscoliosis upper lumbar spine. Significant osteopenia limits evaluation of the spine though no convincing compression fractures.    Prior Therapy: Yes but not for current condition  Social History: Lives alone in a 1-story with 1 MIHAI  Occupation: Retired - teacher in special  education  Prior Level of Function: Chronic condition  Current Level of Function: No pain; decreased mobility and balance limiting activities of daily living    Pain: No reported pain    Patients goals: to improve her balance so that she can avoid falling     Medical History:   Past Medical History:   Diagnosis Date    Osteoporosis     Seizures        Surgical History:   Onelia Duarte  has a past surgical history that includes Hysterectomy; Colonoscopy (07/24/2019); Knee surgery (Right, 2019); Eye surgery (2010 (estimate)); Fracture surgery (9/19/2022); and Tonsillectomy (1955).    Medications:   Onelia has a current medication list which includes the following prescription(s): ascorbic acid (vitamin c), clonazepam, cyanocobalamin, lamotrigine, levetiracetam, levothyroxine, and prolia.    Allergies:   Review of patient's allergies indicates:   Allergen Reactions    Codeine Nausea And Vomiting    Doxycycline hyclate Nausea And Vomiting    Oxycodone-acetaminophen Nausea And Vomiting    Percodan lakeshia Nausea And Vomiting        Objective      Observation: AAOx3    Gait analysis: moderate-significant swaying to her left side secondary to spinal curvature    Posture: levoscoliosis of thoracic region with dextroscoliosis of her lumbar region    Hip Range of Motion:   Left Passive Right Passive   Flexion 85 80   Extension 5 0   External Rotation 45 45   Internal Rotation 30 30     Lumbar:    Active range of motion  Pain/dysfunction with movement:   Flexion 60% Leans to left   Extension 40% no   Left Side Bending 60% no   Right Side Bending 40% no   Left Rotation 50% no   Right Rotation 50% no     Lower extremity manual muscle tests  Left  Right  Pain/dysfunction with movement   Iliopsoas 4-/5 4-/5    Glute max 3-/5 3-/5    Hip abduction 3+/5 3+/5    Hip internal rotation 4/5 4/5    Hip external rotation 4-/5 4-/5    Knee flexion 4-/5 4-/5    Knee extension 4+/5 4+/5      Sensation: intact    Standing flexion  test: (+)    Palpation: no tenderness    Flexibility: will assess at future visit      Intake Outcome Measure for FOTO Lumbar Spine Survey    Therapist reviewed FOTO scores for Onelia Duarte on 7/24/2024.   FOTO report - see Media section or FOTO account episode details.    Intake Score: 62%         Treatment     Total Treatment time (time-based codes) separate from Evaluation: 13 minutes     Onelia received the treatments listed below:      therapeutic activities to improve functional performance for 13 minutes, including:  Home exercise program review:  Seated thoracic extension: 10x  Seated right QL stretch: 10x  Bridges: 10x  Sidelying clams: 10x    Patient Education and Home Exercises     Education provided:   - Diagnosis and prognosis  - Plan of care  - Home exercise program    Written Home Exercises Provided: yes. Exercises were reviewed and Onelia was able to demonstrate them prior to the end of the session.  Onelia demonstrated good  understanding of the education provided. See EMR under Patient Instructions for exercises provided during therapy sessions.    Assessment     Onelia is a 74 y.o. female referred to outpatient Physical Therapy with a medical diagnosis of M41.115 (ICD-10-CM) - Juvenile idiopathic scoliosis of thoracolumbar region. Patient presents with significant scoliotic curve of her thoracolumbar region and reports difficulty with balance and falls. She demonstrates decreased hip and lumbar range of motion, decreased lower extremity strength, as well as gait and postural deficits. These limitations are affecting her performance of activities of daily living and leisure activities. Plan to work to improve her thoracolumbar mobility, functional lower extremity strength, and balance.    Patient prognosis is Fair.   Patient will benefit from skilled outpatient Physical Therapy to address the deficits stated above and in the chart below, provide patient /family education, and to maximize  patientt's level of independence.     Plan of care discussed with patient: Yes  Patient's spiritual, cultural and educational needs considered and patient is agreeable to the plan of care and goals as stated below:     Anticipated Barriers for therapy: chronicity of condition and co-morbidities     Medical Necessity is demonstrated by the following  History  Co-morbidities and personal factors that may impact the plan of care [] LOW: no personal factors / co-morbidities  [x] MODERATE: 1-2 personal factors / co-morbidities  [] HIGH: 3+ personal factors / co-morbidities    Moderate / High Support Documentation:   Co-morbidities affecting plan of care: Osteoporosis, Seizures    Personal Factors:   age     Examination  Body Structures and Functions, activity limitations and participation restrictions that may impact the plan of care [] LOW: addressing 1-2 elements  [] MODERATE: 3+ elements  [x] HIGH: 4+ elements (please support below)    Moderate / High Support Documentation: range of motion, strength, posture, gait     Clinical Presentation [x] LOW: stable  [] MODERATE: Evolving  [] HIGH: Unstable     Decision Making/ Complexity Score: low       Goals:  Short Term Goals (4 Weeks):  1. Patient will increase hip extension range of motion to >/= 10 degrees where limited in order to perform activities of daily living with less difficulty.   2. Patient will improve impaired lower extremity manual muscle tests by 1/3 grade to improve strength for functional tasks.   3. Patient will be compliant with home exercise program to supplement therapy in restoring functional mobility.     Long Term Goals (8 Weeks):   1. Patient will improve impaired lower extremity manual muscle tests to >/=4/5 to improve strength for functional tasks.   2. Patient will improve FOTO score to >/= 68% to demonstrate decrease in perceived limitations with functional mobility.   3. Patient goal: to improve her balance so that she can avoid falling.   4.  Patient will be independent with home exercise program to demonstrate ability to independently manage condition.     Plan     Plan of care Certification: 7/24/2024 to 9/18/2024.    Outpatient Physical Therapy 1 times weekly for 8 weeks to include the following interventions: Electrical Stimulation  , Gait Training, Manual Therapy, Moist Heat/ Ice, Neuromuscular Re-ed, Patient Education, Self Care, Therapeutic Activities, and Therapeutic Exercise.     Krishna Horvath PT, DPT        Physician's Signature: _________________________________________ Date: ________________

## 2024-07-30 ENCOUNTER — CLINICAL SUPPORT (OUTPATIENT)
Dept: REHABILITATION | Facility: HOSPITAL | Age: 74
End: 2024-07-30
Payer: MEDICARE

## 2024-07-30 DIAGNOSIS — Z74.09 IMPAIRED FUNCTIONAL MOBILITY, BALANCE, GAIT, AND ENDURANCE: Primary | ICD-10-CM

## 2024-07-30 PROCEDURE — 97110 THERAPEUTIC EXERCISES: CPT

## 2024-07-30 PROCEDURE — 97112 NEUROMUSCULAR REEDUCATION: CPT

## 2024-07-30 NOTE — PROGRESS NOTES
"[facility]   Physical Therapy Treatment Note      Name: Onelia Hernandez [facility] Number: [id number]    Therapy Diagnosis:   Encounter Diagnosis   Name Primary?    Impaired functional mobility, balance, gait, and endurance Yes     Physician: BELEN Vera NP    Visit Date: 7/30/2024    Physician Orders: PT Eval and Treat  Medical Diagnosis from Referral: M41.115 (ICD-10-CM) - Juvenile idiopathic scoliosis of thoracolumbar region  Evaluation Date: 7/24/2024  Authorization Period Expiration: 12/31/2024  Plan of Care Expiration: 9/18/2024  Progress Note Due: 8/21/2024  Visit # / Visits authorized: 1/20 + EVAL   FOTO: 1/3     Precautions: Standard and Osteoporosis     Time In: 1102  Time Out: 1200  Total Billable Time: 29 minutes    Subjective     Patient reports: she is doing well. Her balance still feels off especially with walking.    She was compliant with home exercise program.  Response to previous treatment: First follow-up  Functional change: First follow-up    Pain: N/A  Location: N/A    Objective      Objective Measures updated at progress report unless specified.     Treatment     Onelia received the treatments listed below:      therapeutic exercises to develop strength, endurance, ROM, and flexibility for 18 minutes including:  NuStep completed for 8' to increase ROM, endurance, and decrease pain to improve tolerance to ADLs and age-related activities  Seated thoracic extension: 20x with 3" hold  Standing thoracic sidebend over swiss ball: 20x with 3" hold    neuromuscular re-education activities to improve: Balance, Coordination, Kinesthetic, Sense, Proprioception, and Posture for 24 minutes. The following activities were included:  Bridges: 3x10  Sidelying clams: 3x10 ea  Standing pallof press: red theraband, 2x12 ea  No moneys against wall: green theraband, 3x10    therapeutic activities to improve functional performance for 16 minutes, including:  Sit<>stands from " "standard chair: 3x10  Step ups: 6" step, 2x10 each leg    Patient Education and Home Exercises       Education provided:   - Continued prior home exercise program    Written Home Exercises Provided: Patient instructed to cont prior HEP. Exercises were reviewed and Onelia was able to demonstrate them prior to the end of the session.  Onelia demonstrated good  understanding of the education provided. See Electronic Medical Record under Patient Instructions for exercises provided during therapy sessions    Assessment     Ms. Rousseau presents to first follow-up session with similar complaints of decreased balance but no pain. Focus of today's visit on working to improve her thoracolumbar mobility, functional lower extremity strength, and periscapular strength. She tolerated first treatment well with request to incorporate more balance interventions moving forward. Will add these interventions at next visit and progress as able.    Onelia Is progressing well towards her goals.   Patient prognosis is Fair.     Patient will continue to benefit from skilled outpatient physical therapy to address the deficits listed in the problem list box on initial evaluation, provide pt/family education and to maximize pt's level of independence in the home and community environment.     Patient's spiritual, cultural and educational needs considered and pt agreeable to plan of care and goals.     Anticipated barriers to physical therapy: chronicity of condition and co-morbidities    Goals:   Short Term Goals (4 Weeks):  1. Patient will increase hip extension range of motion to >/= 10 degrees where limited in order to perform activities of daily living with less difficulty. Progressing, not met  2. Patient will improve impaired lower extremity manual muscle tests by 1/3 grade to improve strength for functional tasks. Progressing, not met  3. Patient will be compliant with home exercise program to supplement therapy in restoring functional " mobility. Progressing, not met     Long Term Goals (8 Weeks):   1. Patient will improve impaired lower extremity manual muscle tests to >/=4/5 to improve strength for functional tasks. Progressing, not met  2. Patient will improve FOTO score to >/= 68% to demonstrate decrease in perceived limitations with functional mobility. Progressing, not met  3. Patient goal: to improve her balance so that she can avoid falling. Progressing, not met  4. Patient will be independent with home exercise program to demonstrate ability to independently manage condition. Progressing, not met    Plan     Plan of care Certification: 7/24/2024 to 9/18/2024.     Outpatient Physical Therapy 1 times weekly for 8 weeks to include the following interventions: Electrical Stimulation , Gait Training, Manual Therapy, Moist Heat/ Ice, Neuromuscular Re-ed, Patient Education, Self Care, Therapeutic Activities, and Therapeutic Exercise.     Krishna Horvath, PT, DPT

## 2024-08-02 ENCOUNTER — PATIENT MESSAGE (OUTPATIENT)
Dept: PRIMARY CARE CLINIC | Facility: CLINIC | Age: 74
End: 2024-08-02
Payer: MEDICARE

## 2024-08-06 ENCOUNTER — CLINICAL SUPPORT (OUTPATIENT)
Dept: REHABILITATION | Facility: HOSPITAL | Age: 74
End: 2024-08-06
Payer: MEDICARE

## 2024-08-06 DIAGNOSIS — Z74.09 IMPAIRED FUNCTIONAL MOBILITY, BALANCE, GAIT, AND ENDURANCE: Primary | ICD-10-CM

## 2024-08-06 PROCEDURE — 97112 NEUROMUSCULAR REEDUCATION: CPT

## 2024-08-06 PROCEDURE — 97110 THERAPEUTIC EXERCISES: CPT

## 2024-08-07 DIAGNOSIS — G40.209 COMPLEX PARTIAL SEIZURES WITH CONSCIOUSNESS IMPAIRED: ICD-10-CM

## 2024-08-07 RX ORDER — CLONAZEPAM 2 MG/1
2 TABLET ORAL 2 TIMES DAILY
Qty: 60 TABLET | Refills: 5 | Status: SHIPPED | OUTPATIENT
Start: 2024-08-07

## 2024-08-07 NOTE — PROGRESS NOTES
"[facility]   Physical Therapy Treatment Note      Name: Onelia Hernandez [facility] Number: [id number]    Therapy Diagnosis:   Encounter Diagnosis   Name Primary?    Impaired functional mobility, balance, gait, and endurance Yes     Physician: BELEN Vera NP    Visit Date: 8/6/2024    Physician Orders: PT Eval and Treat  Medical Diagnosis from Referral: M41.115 (ICD-10-CM) - Juvenile idiopathic scoliosis of thoracolumbar region  Evaluation Date: 7/24/2024  Authorization Period Expiration: 12/31/2024  Plan of Care Expiration: 9/18/2024  Progress Note Due: 8/21/2024  Visit # / Visits authorized: 2/20 + EVAL   FOTO: 1/3     Precautions: Standard and Osteoporosis     Time In: 1001  Time Out: 1100  Total Billable Time: 30 minutes    Subjective     Patient reports: her weekend was good. She feels like she is a getting a bit stronger.    She was compliant with home exercise program.  Response to previous treatment: no exacerbation of symptoms  Functional change: no significant change at this time    Pain: N/A  Location: N/A    Objective      Objective Measures updated at progress report unless specified.     Treatment     Onelia received the treatments listed below:      therapeutic exercises to develop strength, endurance, ROM, and flexibility for 17 minutes including:  NuStep completed for 8' to increase ROM, endurance, and decrease pain to improve tolerance to ADLs and age-related activities  Seated thoracic extension: 20x with 3" hold  Standing thoracic sidebend over swiss ball: 20x with 3" hold    neuromuscular re-education activities to improve: Balance, Coordination, Kinesthetic, Sense, Proprioception, and Posture for 26 minutes. The following activities were included:  Bridges with abd: green theraband, 3x10  Sidelying clams: green theraband, 3x10 ea  Standing pallof press: green theraband, 2x12 ea  Standing cone taps: 2x10 ea  No moneys against wall: green theraband, " "3x10    therapeutic activities to improve functional performance for 16 minutes, including:  Sit<>stands from standard chair: 3x10, 1 upper extremity assist  Step ups: 6" step, 2x10 each leg, 1 upper extremity assist    Patient Education and Home Exercises       Education provided:   - Continued prior home exercise program    Written Home Exercises Provided: Patient instructed to cont prior HEP. Exercises were reviewed and Onelia was able to demonstrate them prior to the end of the session.  Onelia demonstrated good  understanding of the education provided. See Electronic Medical Record under Patient Instructions for exercises provided during therapy sessions    Assessment     Ms. Rousseau presents to therapy reporting slight subjective improvements in lower extremity strength. Continued with thoracolumbar mobility interventions and functional lower extremity strengthening. She was able to progress resistance of glute strengthening exercises with good tolerance. Added single leg balance cone taps today, which she was challenged by. Will continue to progress as tolerated.    Onelia Is progressing well towards her goals.   Patient prognosis is Fair.     Patient will continue to benefit from skilled outpatient physical therapy to address the deficits listed in the problem list box on initial evaluation, provide pt/family education and to maximize pt's level of independence in the home and community environment.     Patient's spiritual, cultural and educational needs considered and pt agreeable to plan of care and goals.     Anticipated barriers to physical therapy: chronicity of condition and co-morbidities    Goals:   Short Term Goals (4 Weeks):  1. Patient will increase hip extension range of motion to >/= 10 degrees where limited in order to perform activities of daily living with less difficulty. Progressing, not met  2. Patient will improve impaired lower extremity manual muscle tests by 1/3 grade to improve strength " for functional tasks. Progressing, not met  3. Patient will be compliant with home exercise program to supplement therapy in restoring functional mobility. Progressing, not met     Long Term Goals (8 Weeks):   1. Patient will improve impaired lower extremity manual muscle tests to >/=4/5 to improve strength for functional tasks. Progressing, not met  2. Patient will improve FOTO score to >/= 68% to demonstrate decrease in perceived limitations with functional mobility. Progressing, not met  3. Patient goal: to improve her balance so that she can avoid falling. Progressing, not met  4. Patient will be independent with home exercise program to demonstrate ability to independently manage condition. Progressing, not met    Plan     Plan of care Certification: 7/24/2024 to 9/18/2024.     Outpatient Physical Therapy 1 times weekly for 8 weeks to include the following interventions: Electrical Stimulation , Gait Training, Manual Therapy, Moist Heat/ Ice, Neuromuscular Re-ed, Patient Education, Self Care, Therapeutic Activities, and Therapeutic Exercise.     Krishna Horvath, PT, DPT

## 2024-08-11 PROBLEM — Z74.09 IMPAIRED FUNCTIONAL MOBILITY, BALANCE, GAIT, AND ENDURANCE: Status: ACTIVE | Noted: 2024-08-11

## 2024-08-12 NOTE — PLAN OF CARE
[facility]   Physical Therapy Initial Evaluation      Name: Onelia Hernandez [facility] Number: [id number]    Therapy Diagnosis:   Encounter Diagnoses   Name Primary?    Juvenile idiopathic scoliosis of thoracolumbar region     Impaired functional mobility, balance, gait, and endurance Yes        Physician: BELEN Vera NP    Physician Orders: PT Eval and Treat  Medical Diagnosis from Referral: M41.115 (ICD-10-CM) - Juvenile idiopathic scoliosis of thoracolumbar region  Evaluation Date: 7/24/2024  Authorization Period Expiration: 06/11/2025  Plan of Care Expiration: 9/18/2024  Progress Note Due: 8/21/2024  Visit # / Visits authorized: 1/1   FOTO: 1/3    Precautions: Standard and Osteoporosis    Time In: 900  Time Out: 1000  Total Billable Time: 60 minutes    Subjective     Date of onset: 5-6 years     History of current condition - Onelia reports: she has scoliosis of her low back with excessive thoracic kyphosis. Her chief complaint is that when she is coming to a standing position or walking she feels off balance - leaning to her left. She has no pain and denies any numbness/tingling. She also reports having no difficulty or pain with quiet breathing or deep breaths. She had a fall on 1/30/2024 that resulted in an ORIF of her left olecranon. She was tying her shoes and fell onto her left elbow. Medical history is significant for seizures and osteoporosis. She is taking medications to control seizures and is on Vitamin D supplements and Prolia for osteoporosis.    Falls: 1/30/2024 and May 2024 (went to turn off her alarm clock and fell)    Imaging: X-ray scoliosis: Bones are significantly demineralized. Dextroscoliosis upper lumbar spine. Significant osteopenia limits evaluation of the spine though no convincing compression fractures.    Prior Therapy: Yes but not for current condition  Social History: Lives alone in a 1-story with 1 MIHAI  Occupation: Retired - teacher in special  education  Prior Level of Function: Chronic condition  Current Level of Function: No pain; decreased mobility and balance limiting activities of daily living    Pain: No reported pain    Patients goals: to improve her balance so that she can avoid falling     Medical History:   Past Medical History:   Diagnosis Date    Osteoporosis     Seizures        Surgical History:   Onelia Duarte  has a past surgical history that includes Hysterectomy; Colonoscopy (07/24/2019); Knee surgery (Right, 2019); Eye surgery (2010 (estimate)); Fracture surgery (9/19/2022); and Tonsillectomy (1955).    Medications:   Onelia has a current medication list which includes the following prescription(s): ascorbic acid (vitamin c), clonazepam, cyanocobalamin, lamotrigine, levetiracetam, levothyroxine, and prolia.    Allergies:   Review of patient's allergies indicates:   Allergen Reactions    Codeine Nausea And Vomiting    Doxycycline hyclate Nausea And Vomiting    Oxycodone-acetaminophen Nausea And Vomiting    Percodan lakeshia Nausea And Vomiting        Objective      Observation: AAOx3    Gait analysis: moderate-significant swaying to her left side secondary to spinal curvature    Posture: levoscoliosis of thoracic region with dextroscoliosis of her lumbar region    Hip Range of Motion:   Left Passive Right Passive   Flexion 85 80   Extension 5 0   External Rotation 45 45   Internal Rotation 30 30     Lumbar:    Active range of motion  Pain/dysfunction with movement:   Flexion 60% Leans to left   Extension 40% no   Left Side Bending 60% no   Right Side Bending 40% no   Left Rotation 50% no   Right Rotation 50% no     Lower extremity manual muscle tests  Left  Right  Pain/dysfunction with movement   Iliopsoas 4-/5 4-/5    Glute max 3-/5 3-/5    Hip abduction 3+/5 3+/5    Hip internal rotation 4/5 4/5    Hip external rotation 4-/5 4-/5    Knee flexion 4-/5 4-/5    Knee extension 4+/5 4+/5      Sensation: intact    Standing flexion  test: (+)    Palpation: no tenderness    Flexibility: will assess at future visit      Intake Outcome Measure for FOTO Lumbar Spine Survey    Therapist reviewed FOTO scores for Onelia Duarte on 7/24/2024.   FOTO report - see Media section or FOTO account episode details.    Intake Score: 62%         Treatment     Total Treatment time (time-based codes) separate from Evaluation: 13 minutes     Onelia received the treatments listed below:      therapeutic activities to improve functional performance for 13 minutes, including:  Home exercise program review:  Seated thoracic extension: 10x  Seated right QL stretch: 10x  Bridges: 10x  Sidelying clams: 10x    Patient Education and Home Exercises     Education provided:   - Diagnosis and prognosis  - Plan of care  - Home exercise program    Written Home Exercises Provided: yes. Exercises were reviewed and Onelia was able to demonstrate them prior to the end of the session.  Onelia demonstrated good  understanding of the education provided. See EMR under Patient Instructions for exercises provided during therapy sessions.    Assessment     Onelia is a 74 y.o. female referred to outpatient Physical Therapy with a medical diagnosis of M41.115 (ICD-10-CM) - Juvenile idiopathic scoliosis of thoracolumbar region. Patient presents with significant scoliotic curve of her thoracolumbar region and reports difficulty with balance and falls. She demonstrates decreased hip and lumbar range of motion, decreased lower extremity strength, as well as gait and postural deficits. These limitations are affecting her performance of activities of daily living and leisure activities. Plan to work to improve her thoracolumbar mobility, functional lower extremity strength, and balance.    Patient prognosis is Fair.   Patient will benefit from skilled outpatient Physical Therapy to address the deficits stated above and in the chart below, provide patient /family education, and to maximize  patientt's level of independence.     Plan of care discussed with patient: Yes  Patient's spiritual, cultural and educational needs considered and patient is agreeable to the plan of care and goals as stated below:     Anticipated Barriers for therapy: chronicity of condition and co-morbidities     Medical Necessity is demonstrated by the following  History  Co-morbidities and personal factors that may impact the plan of care [] LOW: no personal factors / co-morbidities  [x] MODERATE: 1-2 personal factors / co-morbidities  [] HIGH: 3+ personal factors / co-morbidities    Moderate / High Support Documentation:   Co-morbidities affecting plan of care: Osteoporosis, Seizures    Personal Factors:   age     Examination  Body Structures and Functions, activity limitations and participation restrictions that may impact the plan of care [] LOW: addressing 1-2 elements  [] MODERATE: 3+ elements  [x] HIGH: 4+ elements (please support below)    Moderate / High Support Documentation: range of motion, strength, posture, gait     Clinical Presentation [x] LOW: stable  [] MODERATE: Evolving  [] HIGH: Unstable     Decision Making/ Complexity Score: low       Goals:  Short Term Goals (4 Weeks):  1. Patient will increase hip extension range of motion to >/= 10 degrees where limited in order to perform activities of daily living with less difficulty.   2. Patient will improve impaired lower extremity manual muscle tests by 1/3 grade to improve strength for functional tasks.   3. Patient will be compliant with home exercise program to supplement therapy in restoring functional mobility.     Long Term Goals (8 Weeks):   1. Patient will improve impaired lower extremity manual muscle tests to >/=4/5 to improve strength for functional tasks.   2. Patient will improve FOTO score to >/= 68% to demonstrate decrease in perceived limitations with functional mobility.   3. Patient goal: to improve her balance so that she can avoid falling.   4.  Patient will be independent with home exercise program to demonstrate ability to independently manage condition.     Plan     Plan of care Certification: 7/24/2024 to 9/18/2024.    Outpatient Physical Therapy 1 times weekly for 8 weeks to include the following interventions: Electrical Stimulation , Gait Training, Manual Therapy, Moist Heat/ Ice, Neuromuscular Re-ed, Patient Education, Self Care, Therapeutic Activities, and Therapeutic Exercise.     Krishna Horvath PT, DPT        Physician's Signature: _________________________________________ Date: ________________

## 2024-08-13 ENCOUNTER — CLINICAL SUPPORT (OUTPATIENT)
Dept: REHABILITATION | Facility: HOSPITAL | Age: 74
End: 2024-08-13
Payer: MEDICARE

## 2024-08-13 DIAGNOSIS — Z74.09 IMPAIRED FUNCTIONAL MOBILITY, BALANCE, GAIT, AND ENDURANCE: Primary | ICD-10-CM

## 2024-08-13 PROCEDURE — 97110 THERAPEUTIC EXERCISES: CPT

## 2024-08-13 PROCEDURE — 97112 NEUROMUSCULAR REEDUCATION: CPT

## 2024-08-13 PROCEDURE — 97530 THERAPEUTIC ACTIVITIES: CPT

## 2024-08-13 NOTE — PROGRESS NOTES
"[facility]   Physical Therapy Treatment Note      Name: Onelia Hernandez [facility] Number: [id number]    Therapy Diagnosis:   Encounter Diagnosis   Name Primary?    Impaired functional mobility, balance, gait, and endurance Yes     Physician: BELEN Vera NP    Visit Date: 8/13/2024    Physician Orders: PT Eval and Treat  Medical Diagnosis from Referral: M41.115 (ICD-10-CM) - Juvenile idiopathic scoliosis of thoracolumbar region  Evaluation Date: 7/24/2024  Authorization Period Expiration: 12/31/2024  Plan of Care Expiration: 9/18/2024  Progress Note Due: 8/21/2024  Visit # / Visits authorized: 3/20 + EVAL   FOTO: 1/3     Precautions: Standard and Osteoporosis     Time In: 1002  Time Out: 1102  Total Billable Time: 60 minutes    Subjective     Patient reports: she had one instance of loss of balance but was able to catch herself. She does feel like her balance has leatha improving. She likes and feels challenged by the glute strengthening exercises with resistance band at home.    She was compliant with home exercise program.  Response to previous treatment: no exacerbation of symptoms  Functional change: no significant change at this time    Pain: N/A  Location: N/A    Objective      Objective Measures updated at progress report unless specified.     Treatment     Onelia received the treatments listed below:      therapeutic exercises to develop strength, endurance, ROM, and flexibility for 16 minutes including:  NuStep completed for 8' to increase ROM, endurance, and decrease pain to improve tolerance to ADLs and age-related activities  Seated thoracic extension: 25x with 3" hold  Standing thoracic sidebend over swiss ball: 25x with 3" hold    neuromuscular re-education activities to improve: Balance, Coordination, Kinesthetic, Sense, Proprioception, and Posture for 25 minutes. The following activities were included:  Bridges: green theraband, 3x10  Sidelying clams: green " "theraband, 3x10 ea  Standing pallof press: green theraband, 2x12 ea  Standing cone taps: 2x15 ea    Not today:  No moneys against wall: green theraband, 3x10    therapeutic activities to improve functional performance for 19 minutes, including:  Sit<>stands from standard chair holding 10# KB, 3x10  Step ups: 6" step, 3x10 each leg, no upper extremity assist  Suitcase carry: 10#, 2 laps x 15 yds each hand    Patient Education and Home Exercises       Education provided:   - Continued prior home exercise program    Written Home Exercises Provided: Patient instructed to cont prior HEP. Exercises were reviewed and Onelia was able to demonstrate them prior to the end of the session.  Onelia demonstrated good  understanding of the education provided. See Electronic Medical Record under Patient Instructions for exercises provided during therapy sessions    Assessment     Ms. Rousseau presents session with report of an instance of loss of balance, but she was able to catch herself to avoid falling. She continues to be very challenged by single leg balance interventions. Continued with thoracic mobility exercises and able to progress resistance and amount of upper extremity support with functional lower extremity strengthening today. Added suitcase carries to improve frontal plane core stability. Will continue to progress as tolerated.    Onelia Is progressing well towards her goals.   Patient prognosis is Fair.     Patient will continue to benefit from skilled outpatient physical therapy to address the deficits listed in the problem list box on initial evaluation, provide pt/family education and to maximize pt's level of independence in the home and community environment.     Patient's spiritual, cultural and educational needs considered and pt agreeable to plan of care and goals.     Anticipated barriers to physical therapy: chronicity of condition and co-morbidities    Goals:   Short Term Goals (4 Weeks):  1. Patient will " increase hip extension range of motion to >/= 10 degrees where limited in order to perform activities of daily living with less difficulty. Progressing, not met  2. Patient will improve impaired lower extremity manual muscle tests by 1/3 grade to improve strength for functional tasks. Progressing, not met  3. Patient will be compliant with home exercise program to supplement therapy in restoring functional mobility. Progressing, not met     Long Term Goals (8 Weeks):   1. Patient will improve impaired lower extremity manual muscle tests to >/=4/5 to improve strength for functional tasks. Progressing, not met  2. Patient will improve FOTO score to >/= 68% to demonstrate decrease in perceived limitations with functional mobility. Progressing, not met  3. Patient goal: to improve her balance so that she can avoid falling. Progressing, not met  4. Patient will be independent with home exercise program to demonstrate ability to independently manage condition. Progressing, not met    Plan     Plan of care Certification: 7/24/2024 to 9/18/2024.     Outpatient Physical Therapy 1 times weekly for 8 weeks to include the following interventions: Electrical Stimulation , Gait Training, Manual Therapy, Moist Heat/ Ice, Neuromuscular Re-ed, Patient Education, Self Care, Therapeutic Activities, and Therapeutic Exercise.     Krishna Horvath, PT, DPT

## 2024-08-15 ENCOUNTER — OFFICE VISIT (OUTPATIENT)
Dept: NEUROLOGY | Facility: CLINIC | Age: 74
End: 2024-08-15
Payer: MEDICARE

## 2024-08-15 VITALS
HEIGHT: 61 IN | HEART RATE: 77 BPM | SYSTOLIC BLOOD PRESSURE: 126 MMHG | DIASTOLIC BLOOD PRESSURE: 71 MMHG | BODY MASS INDEX: 22.89 KG/M2 | WEIGHT: 121.25 LBS

## 2024-08-15 DIAGNOSIS — Z74.09 IMPAIRED FUNCTIONAL MOBILITY, BALANCE, GAIT, AND ENDURANCE: Primary | ICD-10-CM

## 2024-08-15 DIAGNOSIS — G40.209 COMPLEX PARTIAL SEIZURES WITH CONSCIOUSNESS IMPAIRED: ICD-10-CM

## 2024-08-15 PROCEDURE — 99214 OFFICE O/P EST MOD 30 MIN: CPT | Mod: S$PBB,,, | Performed by: PSYCHIATRY & NEUROLOGY

## 2024-08-15 PROCEDURE — 99999 PR PBB SHADOW E&M-EST. PATIENT-LVL III: CPT | Mod: PBBFAC,,, | Performed by: PSYCHIATRY & NEUROLOGY

## 2024-08-15 PROCEDURE — 99213 OFFICE O/P EST LOW 20 MIN: CPT | Mod: PBBFAC | Performed by: PSYCHIATRY & NEUROLOGY

## 2024-08-15 RX ORDER — LEVETIRACETAM 750 MG/1
750 TABLET ORAL 4 TIMES DAILY
Qty: 360 TABLET | Refills: 3 | Status: SHIPPED | OUTPATIENT
Start: 2024-08-15

## 2024-08-15 RX ORDER — LAMOTRIGINE 200 MG/1
200 TABLET ORAL 2 TIMES DAILY
Qty: 180 TABLET | Refills: 3 | Status: SHIPPED | OUTPATIENT
Start: 2024-08-15

## 2024-08-15 NOTE — PROGRESS NOTES
"Name: Onelia Duarte  MRN: [id number]   CSN: [id number]      Date: 08/15/2024    HISTORY OF PRESENT ILLNESS (HPI)  The patient is a 74 y.o. presents for follow up for epilepsy    Onset was at age 12 and she had single generalized convulsion prior to Erica in the setting of medication changes.     Per MILLER'Mara 2018:   "She has had a long history of partial complex seizures characterized by transient alteration in consciousness with amnesia and occasional repetitive movements affecting the hands and mouth. She had an MRI scan of the brain done in 1992 that was normal. An EEG done at that time showed evidence of right frontal sharp focus. She had been doing well with a combination of Keppra, Lamictal and clonazepam and has had no spells in over a year.  In the past her seizures have been brief and were usually triggered by stress at work.  She is now retired.  She does report that she has had occasional episodes when she feels she might have had a spells but this passes.  This has been very infrequent usually triggered by excessive exertion on stress.  She was last seen by me 2 years ago and denies any new medical problems otherwise.  She came to the clinic accompanied by her .  She is planned to her vacation to [place] next month."      Interim History    No seizures, occasional falls, doing balance PT    6/2023  Fall lat month when she tripped in her home with forehead laceration but no concussive symptoms, recovered well  1/2023  Fall last September with pelvic fx but recovered well following rehab stay, changed CLZ to 2mg bid during rehab stay due to dose confusion but tolerating well  2/2022  No further seizures, ongoing follow up regarding scoliosis and back pain  7/2021  No further seizures, stressor of  passing but coping well  3/2020  No further auras  10/2019  Continues with occasional episodes of transient staring for a few seconds followed by 1-2 minutes confusion, patient and  " estimate this happens about 4/year with no escalation in frequency in recent years.  She has never driven.    ED visits  Episodes of SE  Change in meds    Seizure Seminology  Seizure Type 1  Classification:   Aura -   Ictus  - Nonconv -  - Conv -  - Duration -   Post-ictal  - Symptoms  - Duration  Age of onset   Current Seizure Frequency -    Last Seizure      sz per month 2019 2020   Kamran     Feb     Mar     Apr     May 1    Kj     Jul     Aug     Sep     Oct     Nov     Dec     Tot       Seizure Triggers  Sleep Deprivation -  None  Other medications -  None   Benadral   Tramadol   Other  Psych/stress -  None  Photic stimulation -  None  Hyperventilation -  None  Medical Problems -  None  Menses -   No  Sensory Stimulation    Light  No   Sound  No   Problem Solv No   Other  No  Missed dose of Rx None    AED Treatments  Present regimen  lamotrigine (Lamictal, LTG) 200mg bid  levetiracetam (Keppra, LEV) 750mg qid  clonazepam (Klonopin, CZP) 2mg bid    Prior treatments  phenytoin (Dilantin, PHT)  primidone (Mysoline, PRM)  Not tried  acetazolamide (Diamox, AZM)  Amantadine  brivitiracetam (Briviact, BRV)  carbamazepine (Tegretol, CBZ)  clobazam (Onfi or Frizium, CLB)  ethosuximide (Zarontin, ESM)  eslicarbazine (Aptiom, ESL)  felbamate (felbatol, FBM)  gabapentin (Neurontin, GPN)  lacosamide (Vimpat, LCS)   methsuximide (Celontin, MSM)  oxcarbazepine (Trileptal OXC)  perampanel (Fycompa, FCP)   phenobarbital (Pb)  pregabalin (Lyrica, PGB)  rufinamide (Banzel, RUF)  tiagabine (Gabatril,  TGB)  topiramate (Topamax, TPM)  viagabatrin, (Sabril, VGB)  vagal nerve stimulator (VNS)  valproic acid (Depakote, VPA)  zonisamide (Zonegran, ZNA)  Benzodiazepines  diazepam - rectal (Diastatl)  diazepam - oral (Valium, DZ)  clorazepate (Tranxene, CLZ)  Ativan  Brain Stimulation  Vagal Nerve Stimulation-n/a  DBS- n/a    Adherence/Compliance method  Memory - yes  Mom or Spouse - Yes  Pill Box - no  Bird calendar - no  Turn over  medication bottle - no  Phone alarm - no    Seizure Evaluation  EEG Routine -   EEG Ambulatory -   EEG\Video Monitoring -   MRI/MRA -   CT/CTA Scan -   PET Scan -   Neuropsychological evaluation -   DEXA Scan    Potential Epilepsy Risk Factors:   Pregnancy/Labor/Delivery - full term uncomplicated pregnancy labor and vaginal delivery  Febrile seizures - none  Head injury  - none  CNS infection - none     Stroke - none  Family Hx of Sz - none    PAST MEDICAL HISTORY:   Active Ambulatory Problems     Diagnosis Date Noted    Partial epilepsy with impairment of consciousness 07/29/2012    Osteoporosis     Hypothyroidism (acquired) 06/17/2020    Hyperlipidemia, mixed 02/01/2023    Impaired functional mobility, balance, gait, and endurance 08/11/2024     Resolved Ambulatory Problems     Diagnosis Date Noted    No Resolved Ambulatory Problems     Past Medical History:   Diagnosis Date    Seizures         PAST SURGICAL HISTORY:   Past Surgical History:   Procedure Laterality Date    COLONOSCOPY  07/24/2019    Diverticulosis in the sigmoid and the ascending colon. Repeat in 5 years.    EYE SURGERY  2010 (estimate)    FRACTURE SURGERY  9/19/2022    Pelvic and scapula fractures    HYSTERECTOMY      KNEE SURGERY Right 2019    TONSILLECTOMY  1955        FAMILY HISTORY:   Family History   Problem Relation Name Age of Onset    Hearing loss Mother Corinne DeLuca     Cancer Father Omar Villegas          SOCIAL HISTORY:   Social History     Socioeconomic History    Marital status:    Tobacco Use    Smoking status: Never    Smokeless tobacco: Never   Substance and Sexual Activity    Alcohol use: Never    Drug use: Never    Sexual activity: Not Currently     Partners: Male     Birth control/protection: Post-menopausal     Comment:      Social Determinants of Health     Financial Resource Strain: Low Risk  (5/12/2024)    Received from [facility]    Overall Financial Resource Strain (CARDIA)     Difficulty of Paying  "Living Expenses: Not hard at all   Food Insecurity: No Food Insecurity (5/12/2024)    Received from [facility]    Hunger Vital Sign     Worried About Running Out of Food in the Last Year: Never true     Ran Out of Food in the Last Year: Never true   Transportation Needs: Unmet Transportation Needs (5/12/2024)    Received from [facility]    PRAPARE - Transportation     Lack of Transportation (Medical): Yes     Lack of Transportation (Non-Medical): No   Physical Activity: Inactive (5/12/2024)    Received from [facility]    Exercise Vital Sign     Days of Exercise per Week: 0 days     Minutes of Exercise per Session: 0 min   Stress: No Stress Concern Present (5/12/2024)    Received from [facility]    [employer] - Occupational Stress Questionnaire     Feeling of Stress : Not at all   Housing Stability: Low Risk  (2/3/2024)    Housing Stability Vital Sign     Unable to Pay for Housing in the Last Year: No     Number of Places Lived in the Last Year: 1     Unstable Housing in the Last Year: No        SUBSTANCE USE:  Social History     Tobacco Use    Smoking status: Never    Smokeless tobacco: Never   Substance and Sexual Activity    Alcohol use: Never    Drug use: Never    Sexual activity: Not Currently     Partners: Male     Birth control/protection: Post-menopausal     Comment:       Social History     Tobacco Use    Smoking status: Never    Smokeless tobacco: Never   Substance Use Topics    Alcohol use: Never        ALLERGIES: Codeine, Doxycycline hyclate, Oxycodone-acetaminophen, and Percodan lakeshia     /71   Pulse 77   Ht 5' 1" (1.549 m)   Wt 55 kg (121 lb 4.1 oz)   BMI 22.91 kg/m²     Higher Cortical Function:    Patient is a well developed, pleasant, well groomed individual appearing their stated age  Oriented - intact to person, place and time and followed two step instruction correctly.    Fund of knowledge was appropriate.    R-L Orientation - Intact  Language - " Speech was fluent without evidence for an aphasia.    Cranial Nerves II - XII:    EOMs were intact with normal smooth and no nystagmus.    PERRLA. D/C  Visual fields were full to confrontation.    Motor - facial movement was symmetrical and normal.    Facial sensory - Light touch and pin prick sensations were normal.    Hearing was normal to finger rub.  Palate moved well and was symmetrical with normal palatal and oral sensation.    Tongue movement was full   Normal power and bulk was found in the massiter and rotator muscles of the neck.  Motor: Power, bulk and tone were normal in all extremities.  Sensory: Light touch, pin prick, vibration and position senses were normal in all extremities.    Coordination:       Rapid alternating movements and rapid finger tapping - normal.       Finger to nose - nl.       Arm roll - symmetrical.    Gait:  Station, gait walking were done without difficulty and Romberg was negative.  Tremor: resting, postural, intentional - none  Pulses    Peripheral - strong and symmetrical      IMPRESSION  1. 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 Mobility issues    DISPOSITION:   Continue current regiment  Quad point cane per DME    Return 6-12 months    Greater than 30 minutes spent in chart review, documentation, independent review of imaging, and face to face time with patient    2014 EPILEPSY QUALITY MEASUREMENT (AAN)  1 a. Seizure Frequency: noted  1b. Seizure Intervention: yes  2.                   a. Etiology: unknown  b. Seizure Type: focal w sec GTC sz  c. Epilepsy Syndrome: n/a  3.                   a. Side-effects of AED: no                        b. Intervention for side-effects: n/a  4. Screening for psychiatric or behavioral disorders: yes  5. Personalized epilepsy safety issues and education: yes  6. Counseling for women of child-bearing potential and epilepsy: n/a  7. Referral of treatment-resistant epilepsy to comprehensive epilepsy center (q 2 years): N/A.     The patient was asked to call me/the  clinic 1 week after the test(s) are done to obtain results.  The following issues were  all discussed in detail with the patient and family/caregiver(s):     1. Diagnosis, plans, prognosis, medications and possible side-effects, risks and benefits of treatment, other alternatives to AEDs.  2. Risks related to continued seizures, status epilepticus, SUDEP, driving restrictions and seizure precautions ( no baths but showers are ok, no swimming unsupervised, no use of heavy machinery, no use of sharp moving objects, avoid heights).   3. Issues related to pregnancy, OCP and breast feeding as it relates to epilepsy.  4. The potential of teratogenicity and suicidal risks of anti-epileptic medications.  5.Avoid any activity that compromise patient safety related to seizures.      Questions and concerns raised by the patient and family/care-giver(s) were addressed and they indicated understanding of everything discussed and agreed to plans as above.

## 2024-08-20 ENCOUNTER — CLINICAL SUPPORT (OUTPATIENT)
Dept: REHABILITATION | Facility: HOSPITAL | Age: 74
End: 2024-08-20
Payer: MEDICARE

## 2024-08-20 DIAGNOSIS — Z74.09 IMPAIRED FUNCTIONAL MOBILITY, BALANCE, GAIT, AND ENDURANCE: Primary | ICD-10-CM

## 2024-08-20 PROCEDURE — 97530 THERAPEUTIC ACTIVITIES: CPT | Mod: KX

## 2024-08-20 PROCEDURE — 97112 NEUROMUSCULAR REEDUCATION: CPT | Mod: KX

## 2024-08-20 NOTE — PROGRESS NOTES
"[facility]   Physical Therapy Treatment Note      Name: Onelia Hernandez [facility] Number: [id number]    Therapy Diagnosis:   Encounter Diagnosis   Name Primary?    Impaired functional mobility, balance, gait, and endurance Yes     Physician: BELEN Vera NP    Visit Date: 8/20/2024    Physician Orders: PT Eval and Treat  Medical Diagnosis from Referral: M41.115 (ICD-10-CM) - Juvenile idiopathic scoliosis of thoracolumbar region  Evaluation Date: 7/24/2024  Authorization Period Expiration: 12/31/2024  Plan of Care Expiration: 9/18/2024  Progress Note Due: 8/21/2024  Visit # / Visits authorized: 4/20 + EVAL   FOTO: 1/3     Precautions: Standard and Osteoporosis     Time In: 1004  Time Out: 1100  Total Billable Time: 28 minutes    Subjective     Patient reports: she is doing okay today. Her sister came into town, and they went to a lot of different restaurants and touristy sites. She notes have performed quite a bit of walking but did not experience any instances of loss of balance. She was more cognizant to walk a little slower and more carefully.    She was compliant with home exercise program.  Response to previous treatment: no exacerbation of symptoms  Functional change: no significant change at this time    Pain: N/A  Location: N/A    Objective      Objective Measures updated at progress report unless specified.     Treatment     Onelia received the treatments listed below:      therapeutic exercises to develop strength, endurance, ROM, and flexibility for 14 minutes including:  NuStep completed for 8' to increase ROM, endurance, and decrease pain to improve tolerance to ADLs and age-related activities  Seated thoracic extension: 25x with 3" hold  Standing thoracic sidebend over swiss ball: 25x with 3" hold    neuromuscular re-education activities to improve: Balance, Coordination, Kinesthetic, Sense, Proprioception, and Posture for 24 minutes. The following activities were " "included:  Bridges: green theraband, 3x10  Sidelying clams: green theraband, 2x12 ea  Standing pallof press: green theraband, 2x12 ea  Standing cone taps: 2x15 ea    Not today:  No moneys against wall: green theraband, 3x10    therapeutic activities to improve functional performance for 18 minutes, including:  Sit<>stands from standard chair holding 10# KB, 3x10  Step ups: 6" step, 2x10 each leg, no upper extremity assist  Gait training with SPC: 5 laps x 15 yds    Not today:  Suitcase carry: 10#, 2 laps x 15 yds each hand    Patient Education and Home Exercises       Education provided:   - Continued prior home exercise program    Written Home Exercises Provided: Patient instructed to cont prior HEP. Exercises were reviewed and Onelia was able to demonstrate them prior to the end of the session.  Onelia demonstrated good  understanding of the education provided. See Electronic Medical Record under Patient Instructions for exercises provided during therapy sessions    Assessment     Ms. Rousseau presents to therapy noting no instances of loss of balance with prolonged bouts of ambulation. Focus of therapy remains on working to improve her thoracic mobility, single leg balance, and functional lower extremity endurance/strength. Patient notes that she would like to practice ambulating with a SPC in case she decides to buy one to use in the community. She required quite a bit of verbal and tactile cueing for sequencing but did well. Will continue to progress interventions as able.    Onelia Is progressing well towards her goals.   Patient prognosis is Fair.     Patient will continue to benefit from skilled outpatient physical therapy to address the deficits listed in the problem list box on initial evaluation, provide pt/family education and to maximize pt's level of independence in the home and community environment.     Patient's spiritual, cultural and educational needs considered and pt agreeable to plan of care and " goals.     Anticipated barriers to physical therapy: chronicity of condition and co-morbidities    Goals:   Short Term Goals (4 Weeks):  1. Patient will increase hip extension range of motion to >/= 10 degrees where limited in order to perform activities of daily living with less difficulty. Progressing, not met  2. Patient will improve impaired lower extremity manual muscle tests by 1/3 grade to improve strength for functional tasks. Progressing, not met  3. Patient will be compliant with home exercise program to supplement therapy in restoring functional mobility. Progressing, not met     Long Term Goals (8 Weeks):   1. Patient will improve impaired lower extremity manual muscle tests to >/=4/5 to improve strength for functional tasks. Progressing, not met  2. Patient will improve FOTO score to >/= 68% to demonstrate decrease in perceived limitations with functional mobility. Progressing, not met  3. Patient goal: to improve her balance so that she can avoid falling. Progressing, not met  4. Patient will be independent with home exercise program to demonstrate ability to independently manage condition. Progressing, not met    Plan     Plan of care Certification: 7/24/2024 to 9/18/2024.     Outpatient Physical Therapy 1 times weekly for 8 weeks to include the following interventions: Electrical Stimulation , Gait Training, Manual Therapy, Moist Heat/ Ice, Neuromuscular Re-ed, Patient Education, Self Care, Therapeutic Activities, and Therapeutic Exercise.     Krishna Horvath, PT, DPT

## 2024-08-27 ENCOUNTER — CLINICAL SUPPORT (OUTPATIENT)
Dept: REHABILITATION | Facility: HOSPITAL | Age: 74
End: 2024-08-27
Payer: MEDICARE

## 2024-08-27 DIAGNOSIS — Z74.09 IMPAIRED FUNCTIONAL MOBILITY, BALANCE, GAIT, AND ENDURANCE: Primary | ICD-10-CM

## 2024-08-27 PROCEDURE — 97110 THERAPEUTIC EXERCISES: CPT | Mod: KX

## 2024-08-27 PROCEDURE — 97530 THERAPEUTIC ACTIVITIES: CPT | Mod: KX

## 2024-08-27 PROCEDURE — 97112 NEUROMUSCULAR REEDUCATION: CPT | Mod: KX

## 2024-08-27 NOTE — PROGRESS NOTES
"[facility]   Physical Therapy Treatment Note      Name: Onelia Hernandez [facility] Number: [id number]    Therapy Diagnosis:   Encounter Diagnosis   Name Primary?    Impaired functional mobility, balance, gait, and endurance Yes     Physician: BELEN Vera NP    Visit Date: 8/27/2024    Physician Orders: PT Eval and Treat  Medical Diagnosis from Referral: M41.115 (ICD-10-CM) - Juvenile idiopathic scoliosis of thoracolumbar region  Evaluation Date: 7/24/2024  Authorization Period Expiration: 12/31/2024  Plan of Care Expiration: 9/18/2024  Visit # / Visits authorized: 5/20 + EVAL   FOTO: 1/3     Precautions: Standard and Osteoporosis     Time In: 1101  Time Out: 1158  Total Billable Time: 57 minutes    Subjective     Patient reports: she is doing well. No instances of loss of balance to report. She is having a cane ordered through her physician.    She was compliant with home exercise program.  Response to previous treatment: no exacerbation of symptoms  Functional change: no significant change at this time    Pain: N/A  Location: N/A    Objective      Objective Measures updated at progress report unless specified.     Treatment     Onelia received the treatments listed below:      therapeutic exercises to develop strength, endurance, ROM, and flexibility for 14 minutes including:  NuStep completed for 8' to increase ROM, endurance, and decrease pain to improve tolerance to ADLs and age-related activities  Seated thoracic extension: 25x with 3" hold    Not today:  Standing thoracic sidebend over swiss ball: 25x with 3" hold    neuromuscular re-education activities to improve: Balance, Coordination, Kinesthetic, Sense, Proprioception, and Posture for 15 minutes. The following activities were included:  Bridges: green theraband, 3x10  Sidelying clams: green theraband, 2x12 ea  No moneys against wall: green theraband, 3x10    Not today:  Standing pallof press: green theraband, 2x12 " "ea  Standing cone taps: 2x15 ea    therapeutic activities to improve functional performance for 28 minutes, including:  Step ups: 6" step, 2x10 each leg, no upper extremity assist  Gait training with LBQC: 4 laps x 15 yds  Obstacle course navigation with LBQC: 4 laps x 15 yds    Not today:  Sit<>stands from standard chair holding 10# KB, 3x10  Suitcase carry: 10#, 2 laps x 15 yds each hand    Patient Education and Home Exercises       Education provided:   - Continued prior home exercise program    Written Home Exercises Provided: Patient instructed to cont prior HEP. Exercises were reviewed and Onelia was able to demonstrate them prior to the end of the session.  Onelia demonstrated good  understanding of the education provided. See Electronic Medical Record under Patient Instructions for exercises provided during therapy sessions    Assessment     Ms. Rousseau tolerated treatment interventions well. She was wondering if a LBQC would be safer for her to use in the community, as there would be loss wobble to it, when she is putting her weight through the assistive device. Majority of session was spent on gait training with a LBQC. She continued to require a significant amount of cueing for sequencing of 3-point gait mechanics. Also worked with patient to navigate through an obstacle course to mimic community ambulation with curbs and other barriers. Patient has 1 remaining visit, at which point she will be discharged with an updated home exercise program to continue independent home exercise program performance.    Onelia Is progressing well towards her goals.   Patient prognosis is Fair.     Patient will continue to benefit from skilled outpatient physical therapy to address the deficits listed in the problem list box on initial evaluation, provide pt/family education and to maximize pt's level of independence in the home and community environment.     Patient's spiritual, cultural and educational needs considered and pt " agreeable to plan of care and goals.     Anticipated barriers to physical therapy: chronicity of condition and co-morbidities    Goals:   Short Term Goals (4 Weeks):  1. Patient will increase hip extension range of motion to >/= 10 degrees where limited in order to perform activities of daily living with less difficulty. Progressing, not met  2. Patient will improve impaired lower extremity manual muscle tests by 1/3 grade to improve strength for functional tasks. Progressing, not met  3. Patient will be compliant with home exercise program to supplement therapy in restoring functional mobility. Progressing, not met     Long Term Goals (8 Weeks):   1. Patient will improve impaired lower extremity manual muscle tests to >/=4/5 to improve strength for functional tasks. Progressing, not met  2. Patient will improve FOTO score to >/= 68% to demonstrate decrease in perceived limitations with functional mobility. Progressing, not met  3. Patient goal: to improve her balance so that she can avoid falling. Progressing, not met  4. Patient will be independent with home exercise program to demonstrate ability to independently manage condition. Progressing, not met    Plan     Plan of care Certification: 7/24/2024 to 9/18/2024.     Outpatient Physical Therapy 1 times weekly for 8 weeks to include the following interventions: Electrical Stimulation , Gait Training, Manual Therapy, Moist Heat/ Ice, Neuromuscular Re-ed, Patient Education, Self Care, Therapeutic Activities, and Therapeutic Exercise.     Krishna Horvath, PT, DPT

## 2024-08-30 ENCOUNTER — TELEPHONE (OUTPATIENT)
Dept: NEUROLOGY | Facility: CLINIC | Age: 74
End: 2024-08-30
Payer: MEDICARE

## 2024-08-30 DIAGNOSIS — Z74.09 IMPAIRED FUNCTIONAL MOBILITY, BALANCE, GAIT, AND ENDURANCE: Primary | ICD-10-CM

## 2024-09-03 ENCOUNTER — CLINICAL SUPPORT (OUTPATIENT)
Dept: REHABILITATION | Facility: HOSPITAL | Age: 74
End: 2024-09-03
Payer: MEDICARE

## 2024-09-03 DIAGNOSIS — Z74.09 IMPAIRED FUNCTIONAL MOBILITY, BALANCE, GAIT, AND ENDURANCE: Primary | ICD-10-CM

## 2024-09-03 PROCEDURE — 97112 NEUROMUSCULAR REEDUCATION: CPT | Mod: KX

## 2024-09-03 PROCEDURE — 97110 THERAPEUTIC EXERCISES: CPT | Mod: KX

## 2024-09-03 NOTE — PROGRESS NOTES
"[facility]   Physical Therapy Discharge Note      Name: Onelia Hernandez [facility] Number: [id number]    Therapy Diagnosis:   Encounter Diagnosis   Name Primary?    Impaired functional mobility, balance, gait, and endurance Yes     Physician: BELEN Vera NP    Visit Date: 9/3/2024    Physician Orders: PT Eval and Treat  Medical Diagnosis from Referral: M41.115 (ICD-10-CM) - Juvenile idiopathic scoliosis of thoracolumbar region  Evaluation Date: 2024  Authorization Period Expiration: 2024  Plan of Care Expiration: 2024  Visit # / Visits authorized:  + EVAL   FOTO: 2/3     Precautions: Standard and Osteoporosis     Time In: 1504  Time Out: 1600  Total Billable Time: 28 minutes    Subjective     Patient reports: she is doing okay today. Her sister came into town, and they went to a lot of different restaurants and touristy sites. She notes have performed quite a bit of walking but did not experience any instances of loss of balance. She was more cognizant to walk a little slower and more carefully.    She was compliant with home exercise program.  Response to previous treatment: no exacerbation of symptoms  Functional change: no significant change at this time    Pain: N/A  Location: N/A    Objective      Lumbar:     Active range of motion  Pain/dysfunction with movement:   Flexion 80% Leans to left   Extension 60% no   Left Side Bending 80% no   Right Side Bending 40% no      Lower extremity manual muscle tests  Left  Right  Pain/dysfunction with movement   Iliopsoas 4-/5 4-/5     Glute max 3+/5 3+/5     Hip abduction 3+/5 4-/5     Hip internal rotation 4+/5 4+/5     Hip external rotation 4-/5 4-/5     Knee flexion 4-/5 4-/5     Knee extension 5/5 5/5        Functional Movement Testing:   TU.5" with no assistive device  5x sit<>stand: 8.9" with no upper extremity assist    Intake Outcome Measure for FOTO Lumbar Spine Survey     Therapist reviewed FOTO " "scores for  Onelia Hernandez [facility] on 9/3/2024.   FOTO report - see Media section or FOTO account episode details.     Intake Score: 62%>69%          Treatment     Onelia received the treatments listed below:      therapeutic exercises to develop strength, endurance, ROM, and flexibility for 31 minutes including:  NuStep completed for 8' to increase ROM, endurance, and decrease pain to improve tolerance to ADLs and age-related activities  Seated thoracic extension: 20x with 3" hold  Assessment above  Patient education    Not today:  Standing thoracic sidebend over swiss ball: 25x with 3" hold    neuromuscular re-education activities to improve: Balance, Coordination, Kinesthetic, Sense, Proprioception, and Posture for 25 minutes. The following activities were included:  Bridges: green theraband, 2x10  Sidelying clams: green theraband, 2x10 ea  Sit<>stands from standard chair: 2x10    Not today:  Standing pallof press: green theraband, 2x12 ea  No moneys against wall: green theraband, 3x10    Patient Education and Home Exercises       Education provided:   - Updated home exercise program  - Prevention vs correction of scoliosis  - Concerning signs and symptoms of scoliotic progression    Written Home Exercises Provided: Yes. Exercises were reviewed and Onelia was able to demonstrate them prior to the end of the session.  Onelia demonstrated good  understanding of the education provided. See Electronic Medical Record under Patient Instructions for exercises provided during therapy sessions    Assessment     Ms. Rousseau has been seen for 6 visits since initial evaluation on 7/24/2024. Overall, she has made good, steady progress with her physical therapy treatments and has worked hard towards all of her therapy goals, as evidenced by subjective and objective improvements. Since attending physical therapy, she has reached most of her goals. She has been discharged with an updated home exercise program to continue " independent mobility and strengthening interventions. She was instructed to contact us with any questions or concerns. Patient is agreeable to discharge.    Onelia Is progressing well towards her goals.   Patient prognosis is Fair.     Patient will continue to benefit from skilled outpatient physical therapy to address the deficits listed in the problem list box on initial evaluation, provide pt/family education and to maximize pt's level of independence in the home and community environment.     Patient's spiritual, cultural and educational needs considered and pt agreeable to plan of care and goals.     Anticipated barriers to physical therapy: chronicity of condition and co-morbidities    Goals:   Short Term Goals (4 Weeks):  1. Patient will increase hip extension range of motion to >/= 10 degrees where limited in order to perform activities of daily living with less difficulty. Met  2. Patient will improve impaired lower extremity manual muscle tests by 1/3 grade to improve strength for functional tasks. Met  3. Patient will be compliant with home exercise program to supplement therapy in restoring functional mobility. Met     Long Term Goals (8 Weeks):   1. Patient will improve impaired lower extremity manual muscle tests to >/=4/5 to improve strength for functional tasks. Not met  2. Patient will improve FOTO score to >/= 63% to demonstrate decrease in perceived limitations with functional mobility. Met  3. Patient goal: to improve her balance so that she can avoid falling. Met  4. Patient will be independent with home exercise program to demonstrate ability to independently manage condition. Met    Plan     Plan of care Certification: 7/24/2024 to 9/18/2024.     Discharged with updated home exercise program    Krishna Horvath, PT, DPT

## 2024-09-25 DIAGNOSIS — E03.9 HYPOTHYROIDISM (ACQUIRED): ICD-10-CM

## 2024-09-25 RX ORDER — LEVOTHYROXINE SODIUM 50 UG/1
50 TABLET ORAL DAILY
Qty: 90 TABLET | Refills: 3 | Status: SHIPPED | OUTPATIENT
Start: 2024-09-25

## 2024-09-25 NOTE — TELEPHONE ENCOUNTER
No care due was identified.  [facility] Embedded Care Due Messages. Reference number: [id number].   9/25/2024 9:25:36 AM CDT

## 2024-09-25 NOTE — TELEPHONE ENCOUNTER
----- Message from Frieda Huynh sent at 9/25/2024  9:18 AM CDT -----  Contact: [phone]  Requesting an RX refill or new RX.    Is this a refill or new RX: Refill     RX name and strength (levothyroxine (SYNTHROID) 50 MCG tablett):      Is this a 30 day or 90 day RX: 90    Pharmacy name and phone #       [id number] - [place] - [address]  [address]  [place] [address]  Phone: [phone] Fax: [phone]

## 2024-10-18 ENCOUNTER — PATIENT MESSAGE (OUTPATIENT)
Dept: PRIMARY CARE CLINIC | Facility: CLINIC | Age: 74
End: 2024-10-18
Payer: MEDICARE

## 2024-10-18 DIAGNOSIS — E83.52 HYPERCALCEMIA: Primary | ICD-10-CM

## 2024-10-18 NOTE — TELEPHONE ENCOUNTER
Pt sent a separate message responding with the following:   My message about Calcium and the   PTH test got lost on my portal.  You wanted to know Dr. Beyer  FAX number. It is   [phone].   I would have my tests done at [facility].  Can they be taken on the  same day? That would help me since I  dont drive.  Days that are convenient for me are:  Monday, 10/21, 10/22 and 10/23.  Are these days available? Thanks for your  help in this matter.  Onelia

## 2024-10-18 NOTE — TELEPHONE ENCOUNTER
Pt had lab drawn outside of [facility], [place] 11.3, pt states she had previously stopped the Citracal and any Calcium previously. Pt asking what she should do now?     LOV with Jostin Downing MD , 5/20/2024

## 2024-10-18 NOTE — TELEPHONE ENCOUNTER
I would like to repeat her calcium and a PTH to evaluate for a source of elevated calcium since she has stopped taking supplements.   Lab orders placed.

## 2024-10-21 ENCOUNTER — TELEPHONE (OUTPATIENT)
Dept: ORTHOPEDICS | Facility: CLINIC | Age: 74
End: 2024-10-21
Payer: MEDICARE

## 2024-10-21 NOTE — TELEPHONE ENCOUNTER
Spoke to patient regarding message. Stated that I will give the message Krishan. Patient stated thank you.

## 2024-10-30 ENCOUNTER — PATIENT OUTREACH (OUTPATIENT)
Dept: ADMINISTRATIVE | Facility: HOSPITAL | Age: 74
End: 2024-10-30
Payer: MEDICARE

## 2024-11-01 ENCOUNTER — TELEPHONE (OUTPATIENT)
Dept: NEUROLOGY | Facility: CLINIC | Age: 74
End: 2024-11-01
Payer: MEDICARE

## 2024-11-07 ENCOUNTER — OFFICE VISIT (OUTPATIENT)
Dept: INTERNAL MEDICINE | Facility: CLINIC | Age: 74
End: 2024-11-07
Payer: MEDICARE

## 2024-11-07 ENCOUNTER — HOSPITAL ENCOUNTER (OUTPATIENT)
Dept: RADIOLOGY | Facility: HOSPITAL | Age: 74
Discharge: HOME | End: 2024-11-07
Attending: STUDENT IN AN ORGANIZED HEALTH CARE EDUCATION/TRAINING PROGRAM
Payer: MEDICARE

## 2024-11-07 VITALS
SYSTOLIC BLOOD PRESSURE: 146 MMHG | WEIGHT: 120.56 LBS | OXYGEN SATURATION: 95 % | DIASTOLIC BLOOD PRESSURE: 80 MMHG | HEART RATE: 86 BPM | BODY MASS INDEX: 22.79 KG/M2

## 2024-11-07 DIAGNOSIS — M25.552 LEFT HIP PAIN: ICD-10-CM

## 2024-11-07 DIAGNOSIS — R10.2 PAIN IN FEMALE PELVIS: Primary | ICD-10-CM

## 2024-11-07 DIAGNOSIS — R10.2 PAIN IN FEMALE PELVIS: ICD-10-CM

## 2024-11-07 DIAGNOSIS — M81.0 AGE-RELATED OSTEOPOROSIS WITHOUT CURRENT PATHOLOGICAL FRACTURE: ICD-10-CM

## 2024-11-07 PROCEDURE — 99214 OFFICE O/P EST MOD 30 MIN: CPT | Mod: S$PBB,,, | Performed by: STUDENT IN AN ORGANIZED HEALTH CARE EDUCATION/TRAINING PROGRAM

## 2024-11-07 PROCEDURE — 99999 PR PBB SHADOW E&M-EST. PATIENT-LVL III: CPT | Mod: PBBFAC,,, | Performed by: STUDENT IN AN ORGANIZED HEALTH CARE EDUCATION/TRAINING PROGRAM

## 2024-11-07 PROCEDURE — 73502 X-RAY EXAM HIP UNI 2-3 VIEWS: CPT | Mod: TC,LT

## 2024-11-07 PROCEDURE — 99213 OFFICE O/P EST LOW 20 MIN: CPT | Mod: PBBFAC | Performed by: STUDENT IN AN ORGANIZED HEALTH CARE EDUCATION/TRAINING PROGRAM

## 2024-11-07 PROCEDURE — 73502 X-RAY EXAM HIP UNI 2-3 VIEWS: CPT | Mod: 26,LT,, | Performed by: RADIOLOGY

## 2024-11-07 NOTE — PROGRESS NOTES
[facility]      CHIEF COMPLAINT:   Chief Complaint   Patient presents with    Pelvic Pain     Ongoing since Monday; rates pain a 5       HISTORY OF PRESENT ILLNESS: Onelia Duarte is a 74 y.o. female who presents here today for pelvic pain. This started on Monday of this week (4 days ago), initially improved but then worsened again yesterday. Located LLQ / left hip area and goes across center towards RLQ. Worse when she first starts to walk. She tried a heating pad which helped. Tylenol ha also helped. Similar pain when she broke her pelvic bone in 2020. She does have osteoporosis and is compliant with Prolia injections.     Denies constipation, diarrhea, blood in stool. Denies urinary symptoms including dysuria, hematuria, urgency, frequency. History of hysterectomy 45 years ago.       REVIEW OF SYSTEMS:    ROS as in HPI.       MEDICAL HISTORY:    Past Medical History:   Diagnosis Date    Osteoporosis     Seizures        MEDICATIONS:    Current Outpatient Medications on File Prior to Visit   Medication Sig Dispense Refill    clonazePAM (KLONOPIN) 2 MG Tab Take 1 tablet (2 mg total) by mouth 2 (two) times daily. 60 tablet 5    lamoTRIgine (LAMICTAL) 200 MG tablet Take 1 tablet (200 mg total) by mouth 2 (two) times daily. 180 tablet 3    levETIRAcetam (KEPPRA) 750 MG Tab Take 1 tablet (750 mg total) by mouth 4 (four) times daily. No further refill without appointment 360 tablet 3    levothyroxine (SYNTHROID) 50 MCG tablet Take 1 tablet (50 mcg total) by mouth once daily. 90 tablet 3    PROLIA 60 mg/mL Syrg       ascorbic acid (VITAMIN C) 1000 MG tablet Take 1,000 mg by mouth once daily.      cyanocobalamin (VITAMIN B-12) 1000 MCG tablet Take 100 mcg by mouth once daily.       No current facility-administered medications on file prior to visit.         PHYSICAL EXAM:    BP (!) 146/80 (BP Location: Left arm, Patient Position: Sitting)   Pulse 86   Wt 54.7 kg (120 lb 9.5 oz)    SpO2 95%   BMI 22.79 kg/m²     Physical Exam  Vitals and nursing note reviewed.   Constitutional:       General: She is not in acute distress.     Appearance: Normal appearance. She is not ill-appearing, toxic-appearing or diaphoretic.   HENT:      Head: Normocephalic and atraumatic.      Nose: Nose normal.   Eyes:      Extraocular Movements: Extraocular movements intact.      Conjunctiva/sclera: Conjunctivae normal.      Pupils: Pupils are equal, round, and reactive to light.   Cardiovascular:      Rate and Rhythm: Normal rate.   Pulmonary:      Effort: Pulmonary effort is normal. No respiratory distress.   Abdominal:      General: Abdomen is flat. Bowel sounds are normal.      Tenderness: There is abdominal tenderness in the left lower quadrant. There is no guarding or rebound.          Comments: Pain LLQ vs left anterior hip   Musculoskeletal:      Left hip: Tenderness present. Normal range of motion.   Skin:     Findings: No lesion or rash.   Neurological:      General: No focal deficit present.      Mental Status: She is alert.      Motor: No weakness.      Gait: Gait normal.   Psychiatric:         Mood and Affect: Mood normal.         Behavior: Behavior normal.         Thought Content: Thought content normal.         Judgment: Judgment normal.                 ASSESSMENT & PLAN:    Onelia was seen today for pelvic pain.    Diagnoses and all orders for this visit:    Pain in female pelvis  Left hip pain  Age-related osteoporosis without current pathological fracture  Patient with osteoporosis on Prolia and history of pelvic fracture presents with pain in left pelvis x 4 days without any preceding injury but reportedly similar to pelvic fracture pain she previously had.   On exam, patient has tenderness of lateral LLQ/left hip. No rebound or guarding. Normal hip ROM. Normal gait.   Evaluate further with XR given hx   If normal XR, likely muscle strain   Continue Tylenol and heating pad PRN for pain   Offered  muscle relaxant but declined   -     X-Ray Hip 2 or 3 views Left with Pelvis when performed; Future                Cat Weaver MD  [facility]

## 2024-11-07 NOTE — PATIENT INSTRUCTIONS
Imaging studies as detailed have been ordered. Please complete today.    Continue tylenol and heating pad for your pain.

## 2024-11-21 NOTE — TELEPHONE ENCOUNTER
----- Message from Claire Archuleta sent at 1/2/2018  4:36 PM CST -----  Contact: pt  _  1st Request  _  2nd Request  _  3rd Request        Who: pt    Why: Requesting a call back in regards to needs a jury letter. Please call pt     What Number to Call Back:[phone]    When to Expect a call back: (Within 24 hours)    Please return the call at earliest convenience. Thanks!                             normal

## 2024-11-27 DIAGNOSIS — G40.209 COMPLEX PARTIAL SEIZURES WITH CONSCIOUSNESS IMPAIRED: ICD-10-CM

## 2024-11-27 RX ORDER — CLONAZEPAM 2 MG/1
2 TABLET ORAL 2 TIMES DAILY
Qty: 60 TABLET | Refills: 5 | Status: SHIPPED | OUTPATIENT
Start: 2024-11-27

## 2024-12-02 ENCOUNTER — TELEPHONE (OUTPATIENT)
Dept: NEUROLOGY | Facility: CLINIC | Age: 74
End: 2024-12-02
Payer: MEDICARE

## 2024-12-02 DIAGNOSIS — G40.209 COMPLEX PARTIAL SEIZURES WITH CONSCIOUSNESS IMPAIRED: ICD-10-CM

## 2024-12-02 RX ORDER — CLONAZEPAM 2 MG/1
2 TABLET ORAL 2 TIMES DAILY
Qty: 60 TABLET | Refills: 5 | Status: SHIPPED | OUTPATIENT
Start: 2024-12-02

## 2024-12-02 NOTE — TELEPHONE ENCOUNTER
----- Message from Med Assistant Kerri sent at 12/2/2024 10:56 AM CST -----  Regarding: FW: pharm auth  Contact: [phone]  Please advise. Thank you  ----- Message -----  From: Mohini Crowe  Sent: 11/27/2024   3:54 PM CST  To: Leonides CRUZ Staff  Subject: pharm auth                                       Patient calling to inquire if she can split her clonazePAM (KLONOPIN) 2 MG Tab In half. Patient will be out of town when medication is due. Pharmacy refuses to fill, refill too soon

## 2024-12-24 DIAGNOSIS — M41.115 JUVENILE IDIOPATHIC SCOLIOSIS OF THORACOLUMBAR REGION: Primary | ICD-10-CM

## 2025-01-15 ENCOUNTER — OFFICE VISIT (OUTPATIENT)
Dept: ORTHOPEDICS | Facility: CLINIC | Age: 75
End: 2025-01-15
Payer: MEDICARE

## 2025-01-15 ENCOUNTER — HOSPITAL ENCOUNTER (OUTPATIENT)
Dept: RADIOLOGY | Facility: HOSPITAL | Age: 75
Discharge: HOME | End: 2025-01-15
Attending: ORTHOPAEDIC SURGERY
Payer: MEDICARE

## 2025-01-15 VITALS — WEIGHT: 119.06 LBS | BODY MASS INDEX: 22.48 KG/M2 | HEIGHT: 61 IN

## 2025-01-15 DIAGNOSIS — M51.360 DEGENERATION OF INTERVERTEBRAL DISC OF LUMBAR REGION WITH DISCOGENIC BACK PAIN: ICD-10-CM

## 2025-01-15 DIAGNOSIS — M47.816 LUMBAR SPONDYLOSIS: ICD-10-CM

## 2025-01-15 DIAGNOSIS — M41.115 JUVENILE IDIOPATHIC SCOLIOSIS OF THORACOLUMBAR REGION: ICD-10-CM

## 2025-01-15 DIAGNOSIS — M41.20 IDIOPATHIC SCOLIOSIS IN ADULT PATIENT: Primary | ICD-10-CM

## 2025-01-15 PROCEDURE — 72082 X-RAY EXAM ENTIRE SPI 2/3 VW: CPT | Mod: TC

## 2025-01-15 PROCEDURE — 99204 OFFICE O/P NEW MOD 45 MIN: CPT | Mod: S$PBB,,, | Performed by: ORTHOPAEDIC SURGERY

## 2025-01-15 PROCEDURE — 99999 PR PBB SHADOW E&M-EST. PATIENT-LVL III: CPT | Mod: PBBFAC,,, | Performed by: ORTHOPAEDIC SURGERY

## 2025-01-15 PROCEDURE — 72082 X-RAY EXAM ENTIRE SPI 2/3 VW: CPT | Mod: 26,,, | Performed by: RADIOLOGY

## 2025-01-15 PROCEDURE — 99213 OFFICE O/P EST LOW 20 MIN: CPT | Mod: PBBFAC,25 | Performed by: ORTHOPAEDIC SURGERY

## 2025-01-15 NOTE — PROGRESS NOTES
DATE: 1/15/2025  PATIENT: Onelia Duarte    Attending Physician: Wally Morales M.D.    CHIEF COMPLAINT: scoliosis    HISTORY:  Onelia Duarte is a 74 y.o. female presents for initial evaluation scoliosis and kyphosis. Patient states she has been having some difficulty breathing with walking and lying on a certain side when she sleeps. She is not having severe pain or any numbness/tingling. She is here mainly wondering if her scoliosis is causing her difficulty breathing.    The Patient denies myelopathic symptoms such as handwriting changes or difficulty with buttons/coins/keys. Denies perineal paresthesias, bowel/bladder dysfunction.    The patient does not smoke, have DM or endorse IVDU. The patient is not on any blood thinners and does not take chronic narcotics. She is a retired teacher.    PAST MEDICAL/SURGICAL HISTORY:  Past Medical History:   Diagnosis Date    Osteoporosis     Seizures      Past Surgical History:   Procedure Laterality Date    COLONOSCOPY  07/24/2019    Diverticulosis in the sigmoid and the ascending colon. Repeat in 5 years.    EYE SURGERY  2010 (estimate)    FRACTURE SURGERY  9/19/2022    Pelvic and scapula fractures    HYSTERECTOMY      KNEE SURGERY Right 2019    TONSILLECTOMY  1955       Current Medications:   Current Outpatient Medications:     clonazePAM (KLONOPIN) 2 MG Tab, Take 1 tablet (2 mg total) by mouth 2 (two) times daily., Disp: 60 tablet, Rfl: 5    lamoTRIgine (LAMICTAL) 200 MG tablet, Take 1 tablet (200 mg total) by mouth 2 (two) times daily., Disp: 180 tablet, Rfl: 3    levETIRAcetam (KEPPRA) 750 MG Tab, Take 1 tablet (750 mg total) by mouth 4 (four) times daily. No further refill without appointment, Disp: 360 tablet, Rfl: 3    levothyroxine (SYNTHROID) 50 MCG tablet, Take 1 tablet (50 mcg total) by mouth once daily., Disp: 90 tablet, Rfl: 3    PROLIA 60 mg/mL Syrg, , Disp: , Rfl:     ascorbic acid (VITAMIN C) 1000 MG tablet, Take 1,000 mg by mouth  once daily., Disp: , Rfl:     cyanocobalamin (VITAMIN B-12) 1000 MCG tablet, Take 100 mcg by mouth once daily., Disp: , Rfl:     Social History:   Social History     Socioeconomic History    Marital status:    Tobacco Use    Smoking status: Never    Smokeless tobacco: Never   Substance and Sexual Activity    Alcohol use: Never    Drug use: Never    Sexual activity: Not Currently     Partners: Male     Birth control/protection: Post-menopausal     Comment:      Social Drivers of Health     Financial Resource Strain: Low Risk  (5/12/2024)    Received from [facility]    Overall Financial Resource Strain (CARDIA)     Difficulty of Paying Living Expenses: Not hard at all   Food Insecurity: No Food Insecurity (5/12/2024)    Received from [facility]    Hunger Vital Sign     Worried About Running Out of Food in the Last Year: Never true     Ran Out of Food in the Last Year: Never true   Transportation Needs: Unmet Transportation Needs (5/12/2024)    Received from [facility]    PRAPARE - Transportation     Lack of Transportation (Medical): Yes     Lack of Transportation (Non-Medical): No   Physical Activity: Inactive (5/12/2024)    Received from [facility]    Exercise Vital Sign     Days of Exercise per Week: 0 days     Minutes of Exercise per Session: 0 min   Stress: No Stress Concern Present (5/12/2024)    Received from [facility]    [employer] - Occupational Stress Questionnaire     Feeling of Stress : Not at all   Housing Stability: Low Risk  (2/3/2024)    Housing Stability Vital Sign     Unable to Pay for Housing in the Last Year: No     Number of Places Lived in the Last Year: 1     Unstable Housing in the Last Year: No       REVIEW OF SYSTEMS:  Constitution: Negative. Negative for chills, fever and night sweats.   Cardiovascular: Negative for chest pain and syncope.   Respiratory: Negative for cough and shortness of breath.   Gastrointestinal: See HPI. Negative for  "nausea/vomiting. Negative for abdominal pain.  Genitourinary: See HPI. Negative for discoloration or dysuria.  Skin: Negative for dry skin, itching and rash.   Hematologic/Lymphatic: Negative for bleeding/clotting disorders.   Musculoskeletal: Negative for falls and muscle weakness.   Neurological: See HPI. No history of seizures. No history of cranial surgery or shunts.  Endocrine: Negative for polydipsia, polyphagia and polyuria.   Allergic/Immunologic: Negative for hives and persistent infections.    PHYSICAL EXAMINATION:    Ht 5' 1" (1.549 m)   Wt 54 kg (119 lb 0.8 oz)   BMI 22.49 kg/m²     General: The patient is a 74 y.o. female in no apparent distress, the patient is orientatied to person, place and time.   Psych: Normal mood and affect  HEENT: Vision grossly intact, hearing intact to the spoken word.  Lungs: Respirations unlabored.  Gait: Normal station and gait, no difficulty with toe or heel walk.   Skin: Dorsal lumbar skin negative for rashes, lesions, hairy patches and surgical scars.  Range of motion: Lumbar range of motion is acceptable. There is no lumbar tenderness to palpation.  Spinal Balance: Scoliosis, right sided apex, exaggerated kyphosis  Musculoskeletal: No pain with the range of motion of the bilateral hips. No trochanteric tenderness to palpation.  Vascular: Bilateral lower extremities warm and well perfused, Dorsalis pedis pulses 2+ bilaterally.  Neurological: Normal strength and tone in all major motor groups in the bilateral lower extremities. Normal sensation to light touch in the L2-S1 dermatomes bilaterally.  Deep tendon reflexes symmetric  in the bilateral lower extremities.  Negative Babinski bilaterally.    IMAGING:   Today I independently reviewed the following images and my interpretations are as follows:    AP, Lat and Flex/Ex upright L-spine films demonstrate dextroscoliosis and kyphotic changes of the T spine.    Body mass index is 22.49 kg/m².  No results found for: " ""HGBA1C"      ASSESSMENT/PLAN:    Onelia was seen today for scoliosis.    Diagnoses and all orders for this visit:    Idiopathic scoliosis in adult patient    Lumbar spondylosis    Degeneration of intervertebral disc of lumbar region with discogenic back pain      Follow up if symptoms worsen or fail to improve.    Patient has idiopathic scoliosis. She is currently not having pain. Discussed that her scoliosis and kyphosis is likely not contributing to her difficulty breathing. I discussed the natural history of their diagnoses as well as surgical and nonsurgical treatment options. I educated the patient on the importance of core/back strengthening, correct posture, bending/lifting ergonomics, and low-impact aerobic exercises (walking, elliptical, and aquatherapy). Continue meds PRN. FU PRN.    I provided the patient with a home exercise program. It is the [employer] spine conditioning program. Exercises include head rolls, kneeling back extension, sitting rotation stretch, modified seated side straddle, knee to chest, bird dog, plank, modified seated plank, hip bridges, abdominal bracing, and abdominal crunch. Pt will complete each exercise 5 times daily for 6-8 weeks.    I have personally examined the patient and agree with the above plan.    Wally Morales MD  Orthopaedic Spine Surgeon  Department of Orthopaedic Surgery  [phone]     "

## 2025-02-07 DIAGNOSIS — M41.20 IDIOPATHIC SCOLIOSIS IN ADULT PATIENT: ICD-10-CM

## 2025-02-07 DIAGNOSIS — M47.816 LUMBAR SPONDYLOSIS: Primary | ICD-10-CM

## 2025-02-07 DIAGNOSIS — M51.360 DEGENERATION OF INTERVERTEBRAL DISC OF LUMBAR REGION WITH DISCOGENIC BACK PAIN: ICD-10-CM

## 2025-02-21 ENCOUNTER — CLINICAL SUPPORT (OUTPATIENT)
Dept: REHABILITATION | Facility: HOSPITAL | Age: 75
End: 2025-02-21
Payer: MEDICARE

## 2025-02-21 VITALS — DIASTOLIC BLOOD PRESSURE: 84 MMHG | OXYGEN SATURATION: 100 % | SYSTOLIC BLOOD PRESSURE: 146 MMHG | HEART RATE: 68 BPM

## 2025-02-21 DIAGNOSIS — M41.20 IDIOPATHIC SCOLIOSIS IN ADULT PATIENT: ICD-10-CM

## 2025-02-21 DIAGNOSIS — M47.816 LUMBAR SPONDYLOSIS: ICD-10-CM

## 2025-02-21 DIAGNOSIS — M51.360 DEGENERATION OF INTERVERTEBRAL DISC OF LUMBAR REGION WITH DISCOGENIC BACK PAIN: ICD-10-CM

## 2025-02-21 DIAGNOSIS — Z74.09 DECREASED FUNCTIONAL MOBILITY AND ENDURANCE: ICD-10-CM

## 2025-02-21 DIAGNOSIS — R29.3 POSTURAL IMBALANCE: Primary | ICD-10-CM

## 2025-02-21 PROCEDURE — 97161 PT EVAL LOW COMPLEX 20 MIN: CPT | Performed by: PHYSICAL THERAPIST

## 2025-02-21 NOTE — PROGRESS NOTES
"  Outpatient Rehab    Physical Therapy Evaluation    Patient Name: Onelia Duarte  MRN: [id number]  YOB: 1950  Today's Date: 3/4/2025    Therapy Diagnosis:   Encounter Diagnoses   Name Primary?    Lumbar spondylosis     Idiopathic scoliosis in adult patient     Degeneration of intervertebral disc of lumbar region with discogenic back pain     Postural imbalance Yes    Decreased functional mobility and endurance      Physician: Wally Morales MD  Physician Orders: Eval and Treat  Medical Diagnosis:   M47.816 (ICD-10-CM) - Lumbar spondylosis   M41.20 (ICD-10-CM) - Idiopathic scoliosis in adult patient   M51.360 (ICD-10-CM) - Degeneration of intervertebral disc of lumbar region with discogenic back pain     Visit # / Visits Authorized:  1 / 1   Date of Evaluation:  2/21/2025   Insurance Authorization Period: 2/7/2025 to 2/7/2026  Plan of Care Certification:  2/21/2025 to 4/21/2025      Time In: 0900   Time Out: 1000  Total Time: 60   Total Billable Time: 60 minutes    Intake Outcome Measure for FOTO Survey    Therapist reviewed FOTO scores for Onelia Duarte on 2/21/2025.   FOTO report - see Media section or FOTO account episode details.     Intake Score: 75%         Subjective   History of Present Illness  Onelia is a 74 y.o. female who reports to physical therapy with a chief concern of Trouble Breathing Due to Scoliosis.                 History of Present Condition/Illness: Onelia is a 74 year old female who presents to clinic with complaints of trouble breathing when she "walks fast." This has been going on for the last two to three months and she would like someone to "tell her what is going on." She notes that there is no pain in her spine, but she feels that it is "getting worse". Dr. Morales gave her some exercises, and she wants a physical therapist to go over them with her. She denies numbness and tingling, and endorses a few falls, the most recent one being two years " ago.    Pain     Patient reports a current pain level of 0/10. Pain at best is reported as 0/10. Pain at worst is reported as 0/10.               Past Medical History/Physical Systems Review:   Onelia Duarte  has a past medical history of Osteoporosis and Seizures.    Onelia Duarte  has a past surgical history that includes Hysterectomy; Colonoscopy (07/24/2019); Knee surgery (Right, 2019); Eye surgery (2010 (estimate)); Fracture surgery (9/19/2022); and Tonsillectomy (1955).    Onelia has a current medication list which includes the following prescription(s): ascorbic acid (vitamin c), clonazepam, cyanocobalamin, lamotrigine, levetiracetam, levothyroxine, and prolia.    Review of patient's allergies indicates:   Allergen Reactions    Codeine Nausea And Vomiting    Doxycycline hyclate Nausea And Vomiting    Oxycodone-acetaminophen Nausea And Vomiting    Percodan lakeshia Nausea And Vomiting        Objective   Vital Signs  BP (!) 146/84   Pulse 68   SpO2 100%   BP Location: Left arm  BP Position: Sitting  BP Cuff Size: Adult         Posture                 Signficant dextroscoliosis with sidebending to the left and rotation to the right.  Six-Minute Walking Test: 23.5 laps of 20 feet (down and back)= 940 feet    (Starting Vitals Above)  RPE at Completion: 5/10 (Somewhat Hard)  SpO2: 87% upon completion of the Six Minute Walk Test with self-reported symptoms of fatigue and shortness of breath.  *30 seconds to return to 97% SpO2    Vitals  Heart Rate: 76 bpm  Blood Pressure: 163/83 mmHg      Treatment: Not at this time due to time constraints.               Assessment & Plan   Assessment  Onelia presents with a condition of Moderate complexity.   Presentation of Symptoms: Evolving  Will Comorbidities Impact Care: Yes       Functional Limitations: Activity tolerance, Completing self-care activities, Completing work/school activities, Decreased ambulation distance/endurance, Increased risk of  fall  Impairments: Abnormal or restricted range of motion, Activity intolerance, Impaired physical strength    Prognosis: Fair  Assessment Details: Onelia is a very pleasant, soft-spoken 74 year of age individual who presents to physical therapy with functional impairments of prolonged walking as her main complaint, due to feelings of fatigue and shortness of breath. She is also concerned that her scoliotic curve is affecting this and she would like her scoliosis addressed as well. Onelia is educated that at this stage of life the skeletal system will not change significantly as it would in adolescence and aim is to prevent worsening rather than reversal. During 6MWT, she performs well with good distance, gait quality and strength but she does desaturate to 87% and this will be addressed with her referring provider. Onelia will benefit from a customized physical therapy plan of care  to address the aforementioned impairments in an effort to improve human function and quality of life.      Plan  From a physical therapy perspective, the patient would benefit from: Skilled Rehab Services    Planned therapy interventions include: Therapeutic exercise, Therapeutic activities, Neuromuscular re-education, Manual therapy, and ADLs/IADLs.            Visit Frequency: 1 times Per Week for 4 Weeks.       This plan was discussed with Patient.              Patient's spiritual, cultural, and educational needs considered and patient agreeable to plan of care and goals.     Education  Education was done with Patient.           -Findings of evaluation and examination, and affect of these on plan for treatment -Prognosis and expectations -Role of PT and team-centered care for patient -Home exercise program and expectations of therapy         Goals:   Active       Physical Therapy       Short Term Goals: 4 weeks   1.) Patient will demonstrate independence in compliance and technique of home exercise program provided as per teach-back  method of assessment.  2.) Patient will achieve 0-100 degrees of knee flexion to demonstrate progressing range of motion for improved functional mobility.  3.) Patient will note <10% disability as per FOTO score.       Physical Therapy Goal       Start:  03/04/25    Expected End:  04/21/25       Short Term Goals: 4 weeks   1.) Patient will demonstrate independence in compliance and technique of home exercise program provided as per teach-back method of assessment.  2.) Patient will achieve 0-100 degrees of knee flexion to demonstrate progressing range of motion for improved functional mobility.  3.) Patient will note <10% disability as per FOTO score.                Anali Villarreal, PT DPT  Board Certified in Orthopedic Physical Therapy

## 2025-02-25 ENCOUNTER — CLINICAL SUPPORT (OUTPATIENT)
Dept: REHABILITATION | Facility: HOSPITAL | Age: 75
End: 2025-02-25
Payer: MEDICARE

## 2025-02-25 VITALS — HEART RATE: 67 BPM | OXYGEN SATURATION: 99 % | SYSTOLIC BLOOD PRESSURE: 117 MMHG | DIASTOLIC BLOOD PRESSURE: 75 MMHG

## 2025-02-25 DIAGNOSIS — M47.816 LUMBAR SPONDYLOSIS: Primary | ICD-10-CM

## 2025-02-25 DIAGNOSIS — Z74.09 IMPAIRED FUNCTIONAL MOBILITY, BALANCE, GAIT, AND ENDURANCE: ICD-10-CM

## 2025-02-25 DIAGNOSIS — M41.20 IDIOPATHIC SCOLIOSIS IN ADULT PATIENT: ICD-10-CM

## 2025-02-25 DIAGNOSIS — Z74.09 DECREASED FUNCTIONAL MOBILITY AND ENDURANCE: ICD-10-CM

## 2025-02-25 DIAGNOSIS — R29.3 POSTURAL IMBALANCE: ICD-10-CM

## 2025-02-25 PROCEDURE — 97530 THERAPEUTIC ACTIVITIES: CPT | Performed by: PHYSICAL THERAPIST

## 2025-02-25 PROCEDURE — 97112 NEUROMUSCULAR REEDUCATION: CPT | Performed by: PHYSICAL THERAPIST

## 2025-02-25 PROCEDURE — 97110 THERAPEUTIC EXERCISES: CPT | Performed by: PHYSICAL THERAPIST

## 2025-02-25 NOTE — PROGRESS NOTES
Outpatient Rehab    Physical Therapy Visit    Patient Name: Onelia Duarte  MRN: [id number]  YOB: 1950  Encounter Date: 2/25/2025    Therapy Diagnosis:   Encounter Diagnoses   Name Primary?    Lumbar spondylosis Yes    Idiopathic scoliosis in adult patient     Postural imbalance     Decreased functional mobility and endurance     Impaired functional mobility, balance, gait, and endurance      Physician: Wally Morales MD    Physician Orders: Eval and Treat  Physician: Wally Morales MD  Physician Orders: Eval and Treat  Medical Diagnosis:   M47.816 (ICD-10-CM) - Lumbar spondylosis   M41.20 (ICD-10-CM) - Idiopathic scoliosis in adult patient   M51.360 (ICD-10-CM) - Degeneration of intervertebral disc of lumbar region with discogenic back pain      Visit # / Visits Authorized:  1 / 10   Date of Evaluation:  2/21/2025   Insurance Authorization Period: 2/7/2025 to 2/7/2026  Plan of Care Certification:  2/21/2025 to 4/21/2025   Time In: 1000   Time Out: 1100  Total Time: 60   Total Billable Time: 60 minutes one on one    FOTO:  Intake Score:  %  Survey Score 1:  %  Survey Score 2:  %    Precautions     Standard      Subjective   She notes no considerable change, and has brought her exercises which she would like to go over in clinic for efficacy today. She denies any falls since our last visit..         Objective   Vital Signs  /75   Pulse 67   SpO2 99%   BP Location: Left arm  BP Position: Sitting  BP Cuff Size: Adult               Treatment:  Therapeutic Exercise  Therapeutic Exercise Activity 1: NuStep, 8 minutes, Level 3, Hill Setting (98% SpO2 after 2 minutes; 98% SpO2 after 2 minutes)         Balance/Neuromuscular Re-Education  Balance/Neuromuscular Re-Education Activity 1: Bridge, 3x8, Blue Band  Balance/Neuromuscular Re-Education Activity 2: TrA Activation with Biofeedback Cuff, 40-50 mmHg    Therapeutic Activity  Therapeutic Activity 1: Modified Sitting Position to Offset  Dextroscoliotic Curve)  Therapeutic Activity 2: Modified Sleeping Position to Offset Dextroscoliotic Curve) utilizing pillows for curve reversal and hip support         Assessment & Plan   Assessment: Onelia arrives with normal vital readings, and normal SpO2 readings of 98-99%, and this is maintained while on the NuStep. She demonstrates considerable dextroscoliosis with right-sided rotation. She also has significant limited hip mobility and challenge with TrA activation. She was educated on sleeping and sitting positions that aid to avoid further predilaction into the curve position.  Evaluation/Treatment Tolerance: Patient tolerated treatment well    Patient will continue to benefit from skilled outpatient physical therapy to address the deficits listed in the problem list box on initial evaluation, provide pt/family education and to maximize pt's level of independence in the home and community environment.     Patient's spiritual, cultural, and educational needs considered and patient agreeable to plan of care and goals.           Plan: Continue as per POC with follow-up in 1-2 weeks.    Goals:   Active       Physical Therapy       Short Term Goals: 4 weeks   1.) Patient will demonstrate independence in compliance and technique of home exercise program provided as per teach-back method of assessment.  2.) Patient will achieve 0-100 degrees of knee flexion to demonstrate progressing range of motion for improved functional mobility.  3.) Patient will note <10% disability as per FOTO score.       Physical Therapy Goal       Start:  03/04/25    Expected End:  04/21/25       Short Term Goals: 4 weeks   1.) Patient will demonstrate independence in compliance and technique of home exercise program provided as per teach-back method of assessment.  2.) Patient will achieve 0-100 degrees of knee flexion to demonstrate progressing range of motion for improved functional mobility.  3.) Patient will note <10% disability as  per FOTO score.                Anali Villarreal, PT DPT  Board Certified in Orthopedic Physical Therapy     [facility]OLOGY  [address] [place] [address], [address]  [place] [address]  Phone: [phone]  Fax: [phone]  Follow Up Time:     Kishor Mariano)  Hematology; Internal Medicine; Medical Oncology  [address] [place] [address]  Phone: [phone]  Fax: [phone]  Follow Up Time:

## 2025-03-06 ENCOUNTER — CLINICAL SUPPORT (OUTPATIENT)
Dept: REHABILITATION | Facility: HOSPITAL | Age: 75
End: 2025-03-06
Payer: MEDICARE

## 2025-03-06 DIAGNOSIS — M47.816 LUMBAR SPONDYLOSIS: Primary | ICD-10-CM

## 2025-03-06 DIAGNOSIS — M41.20 IDIOPATHIC SCOLIOSIS IN ADULT PATIENT: ICD-10-CM

## 2025-03-06 DIAGNOSIS — Z74.09 DECREASED FUNCTIONAL MOBILITY AND ENDURANCE: ICD-10-CM

## 2025-03-06 DIAGNOSIS — R29.3 POSTURAL IMBALANCE: ICD-10-CM

## 2025-03-06 PROCEDURE — 97112 NEUROMUSCULAR REEDUCATION: CPT | Performed by: PHYSICAL THERAPIST

## 2025-03-06 PROCEDURE — 97530 THERAPEUTIC ACTIVITIES: CPT | Performed by: PHYSICAL THERAPIST

## 2025-03-06 NOTE — PROGRESS NOTES
"  Outpatient Rehab    Physical Therapy Visit    Patient Name: Onelia Duarte  MRN: [id number]  YOB: 1950  Encounter Date: 3/6/2025    Therapy Diagnosis:   Encounter Diagnoses   Name Primary?    Lumbar spondylosis Yes    Idiopathic scoliosis in adult patient     Postural imbalance     Decreased functional mobility and endurance        Physician: Wally Morales MD    Physician Orders: Eval and Treat  Physician: Wally Morales MD  Physician Orders: Eval and Treat  Medical Diagnosis:   M47.816 (ICD-10-CM) - Lumbar spondylosis   M41.20 (ICD-10-CM) - Idiopathic scoliosis in adult patient   M51.360 (ICD-10-CM) - Degeneration of intervertebral disc of lumbar region with discogenic back pain      Visit # / Visits Authorized:  3 / 10   Date of Evaluation:  2/21/2025   Insurance Authorization Period: 2/7/2025 to 2/7/2026  Plan of Care Certification:  2/21/2025 to 4/21/2025   Time In: 1100   Time Out: 1200  Total Time: 60   Total Billable Time: 24 minutes one on one    FOTO:  Intake Score:  %  Survey Score 1:  %  Survey Score 2:  %         Subjective   She attempted the sleeping positions, and this made her hurt everywhere. She has been feeling fine since then. She has not had shortness of breath but has not been doing anything strenuous lately..         Objective    Posterior Gluteus Medius: 3-/5 right; 3+/5 left          Treatment:  Therapeutic Exercise  Therapeutic Exercise Activity 1: NuStep, 8 minutes, Level 3, Hill Setting (98% SpO2 after 2 minutes; 98% SpO2 after 2 minutes)  Therapeutic Exercise Activity 2: Open Book, 15x5" hold  Therapeutic Exercise Activity 3: Lower Trunk Rotation with ADD Isometric, 15x each side         Balance/Neuromuscular Re-Education  Balance/Neuromuscular Re-Education Activity 1: Clamshells, yellow band, 3x8 (left), no resistance on right  Balance/Neuromuscular Re-Education Activity 2: Bridges, 2x12, blue band    Therapeutic Activity  Therapeutic Activity 1: Modified " Sitting Position to Offset Dextroscoliotic Curve)         Assessment & Plan   Assessment: Onelia was taken through a comprehensive home program to maintain spinal mobility, address asymmetric muscle imbalances (right glute was weaker as compared to the left side due to curvature) and positioning during sitting to avoid facilitating curvature.  Evaluation/Treatment Tolerance: Patient tolerated treatment well    Patient will continue to benefit from skilled outpatient physical therapy to address the deficits listed in the problem list box on initial evaluation, provide pt/family education and to maximize pt's level of independence in the home and community environment.     Patient's spiritual, cultural, and educational needs considered and patient agreeable to plan of care and goals.           Plan: Discharge with home exercise program.    Goals:   Active       Physical Therapy       Short Term Goals: 4 weeks   1.) Patient will demonstrate independence in compliance and technique of home exercise program provided as per teach-back method of assessment.  2.) Patient will achieve 0-100 degrees of knee flexion to demonstrate progressing range of motion for improved functional mobility.  3.) Patient will note <10% disability as per FOTO score.       Physical Therapy Goal       Start:  03/04/25    Expected End:  04/21/25       Short Term Goals: 4 weeks   1.) Patient will demonstrate independence in compliance and technique of home exercise program provided as per teach-back method of assessment.  2.) Patient will achieve 0-100 degrees of knee flexion to demonstrate progressing range of motion for improved functional mobility.  3.) Patient will note <10% disability as per FOTO score.                Anali Villarreal, PT DPT  Board Certified in Orthopedic Physical Therapy

## 2025-03-25 ENCOUNTER — TELEPHONE (OUTPATIENT)
Dept: PRIMARY CARE CLINIC | Facility: CLINIC | Age: 75
End: 2025-03-25
Payer: MEDICARE

## 2025-03-25 ENCOUNTER — LAB VISIT (OUTPATIENT)
Dept: LAB | Facility: HOSPITAL | Age: 75
End: 2025-03-25
Attending: NURSE PRACTITIONER
Payer: MEDICARE

## 2025-03-25 ENCOUNTER — OFFICE VISIT (OUTPATIENT)
Dept: PRIMARY CARE CLINIC | Facility: CLINIC | Age: 75
End: 2025-03-25
Payer: MEDICARE

## 2025-03-25 VITALS
DIASTOLIC BLOOD PRESSURE: 68 MMHG | WEIGHT: 121.5 LBS | HEART RATE: 79 BPM | HEIGHT: 61 IN | OXYGEN SATURATION: 97 % | BODY MASS INDEX: 22.94 KG/M2 | SYSTOLIC BLOOD PRESSURE: 118 MMHG

## 2025-03-25 DIAGNOSIS — M81.0 OSTEOPOROSIS, UNSPECIFIED OSTEOPOROSIS TYPE, UNSPECIFIED PATHOLOGICAL FRACTURE PRESENCE: ICD-10-CM

## 2025-03-25 DIAGNOSIS — E83.52 HYPERCALCEMIA: ICD-10-CM

## 2025-03-25 DIAGNOSIS — E03.9 HYPOTHYROIDISM (ACQUIRED): ICD-10-CM

## 2025-03-25 DIAGNOSIS — Z01.818 PREOP EXAMINATION: Primary | ICD-10-CM

## 2025-03-25 DIAGNOSIS — E78.2 HYPERLIPIDEMIA, MIXED: ICD-10-CM

## 2025-03-25 DIAGNOSIS — G40.209 PARTIAL EPILEPSY WITH IMPAIRMENT OF CONSCIOUSNESS: ICD-10-CM

## 2025-03-25 DIAGNOSIS — H26.9 CATARACT, UNSPECIFIED CATARACT TYPE, UNSPECIFIED LATERALITY: ICD-10-CM

## 2025-03-25 LAB
CALCIUM SERPL-MCNC: 10.1 MG/DL (ref 8.7–10.5)
PTH-INTACT SERPL-MCNC: 30.7 PG/ML (ref 9–77)

## 2025-03-25 PROCEDURE — 83970 ASSAY OF PARATHORMONE: CPT

## 2025-03-25 PROCEDURE — 99999 PR PBB SHADOW E&M-EST. PATIENT-LVL III: CPT | Mod: PBBFAC,,, | Performed by: NURSE PRACTITIONER

## 2025-03-25 PROCEDURE — 82310 ASSAY OF CALCIUM: CPT

## 2025-03-25 PROCEDURE — 99213 OFFICE O/P EST LOW 20 MIN: CPT | Mod: PBBFAC | Performed by: NURSE PRACTITIONER

## 2025-03-25 PROCEDURE — 99214 OFFICE O/P EST MOD 30 MIN: CPT | Mod: S$PBB,,, | Performed by: NURSE PRACTITIONER

## 2025-03-25 PROCEDURE — 36415 COLL VENOUS BLD VENIPUNCTURE: CPT

## 2025-03-25 NOTE — TELEPHONE ENCOUNTER
----- Message from Teri sent at 3/25/2025  9:38 AM CDT -----  .1MEDICALADVICE Patient is calling for Medical Advice regarding:Patient notifying [facility] or [facility]:call Comments:Please advise patient replies from provider may take up to 48 hours.

## 2025-03-25 NOTE — PROGRESS NOTES
[facility] Note    Chief Complaint      Chief Complaint   Patient presents with    Pre-op Exam       History of Present Illness      Onelia Duarte is a 74 y.o. female with chronic conditions of osteoporosis,seizures who presents today for: preop cataract R with Dr. Barnett. Denies chest pain/sob. Able to walk city block or flight of stairs without chest pain/sob.   Not currently on blood thinners    Seizures d/o: on keppra, Lamictal. Has been seizure free for years. Has klonopin as needed. Sees Dr. Buenrostro.  Osteoporosis: on Prolia injections.   Hypothyroid: on synthroid. Last TSH 2.63  Hypercalcemia: last Calcium 11.3, will get repeat labs/    Past Medical History:  Past Medical History:   Diagnosis Date    Osteoporosis     Seizures        Past Surgical History:   has a past surgical history that includes Hysterectomy; Colonoscopy (07/24/2019); Knee surgery (Right, 2019); Eye surgery (2010 (estimate)); Fracture surgery (9/19/2022); and Tonsillectomy (1955).    Family History:  family history includes Cancer in her father; Hearing loss in her mother.     Social History:  Social History[1]    Review of Systems   Constitutional:  Negative for chills and fever.   Respiratory:  Negative for cough and shortness of breath.    Cardiovascular:  Negative for chest pain and palpitations.   Gastrointestinal:  Negative for constipation, diarrhea, nausea and vomiting.   Genitourinary:  Negative for dysuria and hematuria.   Musculoskeletal:  Negative for falls.   Neurological:  Negative for headaches.        Medications:  Encounter Medications[2]    Allergies:  Review of patient's allergies indicates:   Allergen Reactions    Codeine Nausea And Vomiting    Doxycycline hyclate Nausea And Vomiting    Oxycodone-acetaminophen Nausea And Vomiting    Percodan lakeshia Nausea And Vomiting       Health Maintenance:  Immunization History   Administered Date(s) Administered    COVID-19, MRNA, LN-S, PF (Pfizer)  "(Purple Cap) 02/26/2021, 03/19/2021, 10/06/2021    COVID-19, mRNA, LNP-S, PF (Moderna) Ages 12+ 12/13/2024    COVID-19, mRNA, LNP-S, bivalent booster, PF (PFIZER OMICRON) 10/04/2022    Influenza (FLUAD) - Quadrivalent - Adjuvanted - PF *Preferred* (65+) 10/07/2020, 10/26/2021, 09/02/2023    Influenza - Trivalent - Fluarix, Flulaval, Fluzone, Afluria - PF 10/06/2015, 12/28/2016    Influenza - Trivalent - Fluzone High Dose - PF (65 years and older) 12/05/2017, 08/21/2018, 11/08/2019, 10/07/2020, 10/03/2022, 09/27/2024    PPD Test 10/03/2022    Pneumococcal Conjugate - 13 Valent 07/30/2018    Pneumococcal Polysaccharide - 23 Valent 12/06/2009, 07/06/2017    RSVpreF (Arexvy) 04/12/2024    Tdap 12/19/2020, 05/29/2023    Zoster Recombinant 09/15/2024      Health Maintenance   Topic Date Due    Hepatitis C Screening  Never done    Mammogram  05/15/2025    DEXA Scan  10/25/2027    Lipid Panel  01/29/2029    Colorectal Cancer Screening  08/11/2032    TETANUS VACCINE  05/29/2033    Shingles Vaccine  Completed    Influenza Vaccine  Completed    COVID-19 Vaccine  Completed    RSV Vaccine (Age 60+ and Pregnant patients)  Completed    Pneumococcal Vaccines (Age 50+)  Completed        Physical Exam      Vital Signs  Pulse: 79  SpO2: 97 %  BP: 118/68  BP Location: Left arm  Patient Position: Sitting  Height and Weight  Height: 5' 1" (154.9 cm)  Weight: 55.1 kg (121 lb 7.6 oz)  BSA (Calculated - sq m): 1.54 sq meters  BMI (Calculated): 23  Weight in (lb) to have BMI = 25: 132]    Physical Exam  Constitutional:       Appearance: She is well-developed.   HENT:      Head: Normocephalic and atraumatic.   Cardiovascular:      Rate and Rhythm: Normal rate and regular rhythm.      Heart sounds: Normal heart sounds. No murmur heard.  Pulmonary:      Effort: Pulmonary effort is normal. No respiratory distress.      Breath sounds: Normal breath sounds.   Abdominal:      General: There is no distension.      Palpations: Abdomen is soft.      " Tenderness: There is no abdominal tenderness. There is no guarding.   Skin:     General: Skin is warm and dry.   Neurological:      Mental Status: She is alert. Mental status is at baseline.   Psychiatric:         Behavior: Behavior normal.          Laboratory:  CBC:  Recent Labs   Lab 09/28/22  0455 12/27/22  0946 01/29/24  1014   WBC 6.4 6.2 6.4   RBC  --  3.60 L  --    Hemoglobin 10.6 L 11.5 L 11.0 L   Hematocrit 30.9 L 33.9 L 32.4 L   Platelets  --  275  --    MCV 96.2 94.3 96.7   MCH 33.0 31.9 32.7   MCHC 34.3 33.8 33.8     CMP:  Recent Labs   Lab 12/27/22  0946 01/29/24  1014 01/29/24  1014 04/15/24  0958 10/14/24  1547   Glucose 95  --   --   --   --    Calcium 9.7 10.1   < > 11.1 H 11.3 H   Albumin  --  4.4   < > 4.5  --    ALBUMIN 4.4  --   --   --   --    Total Protein 6.7  --   --   --   --    Sodium 139 139   < > 141  --    Potassium 4.5 4.1   < > 3.9  --    CO2 30  --   --   --   --    Carbon Dioxide  --  29   < > 35 H  --    Chloride 100  --   --   --   --    BUN 21.0 17.0   < > 18.0  --    Alkaline Phosphatase 112  --   --   --   --    ALT 17 20   < > 18  --    AST 22 23   < > 24  --    Total Bilirubin 0.5 0.7  --  0.9  --     < > = values in this interval not displayed.     URINALYSIS:       LIPIDS:  Recent Labs   Lab 12/27/22  0000 12/27/22  0946 01/29/24  1014   TSH  --  2.79 2.63   HDL 88 A 88 H 80 H   Cholesterol 179 179 154   Triglycerides 75 75 91   LDL Calculated 79 79 56   Hdl/Cholesterol Ratio  --   --  1.93   Non-HDL Cholesterol  --   --  74     TSH:  Recent Labs   Lab 12/27/22  0946 01/29/24  1014   TSH 2.79 2.63     A1C:        Assessment/Plan     Onelia Duarte is a 74 y.o.female with:    1. Preop examination  Pt medically optimized for low risk procedure and may proceed with surgery  Completed form and will fax. Gave patient a copy.     2. Cataract, unspecified cataract type, unspecified laterality  Stable. See above    3. Partial epilepsy with impairment of  consciousness  Stable. Continue current meds.     4. Osteoporosis, unspecified osteoporosis type, unspecified pathological fracture presence  Stable. Continue current meds.      5. Hypothyroidism (acquired)  Stable. Continue current meds.      6. Hyperlipidemia, mixed  Stable. Continue diet    7. Hypercalcemia   Needs to repeat labs, will do today.    Chronic conditions status updated as per HPI.  Other than changes above, cont current medications and maintain follow up with specialists.  No follow-ups on file.    Future Appointments   Date Time Provider Department Center   6/4/2025  3:15 PM Jostin Downing MD [facility] Adrienne Cotaya, FNP [facility]                       [1]   Social History  Tobacco Use    Smoking status: Never    Smokeless tobacco: Never   Substance Use Topics    Alcohol use: Never    Drug use: Never   [2]   Outpatient Encounter Medications as of 3/25/2025   Medication Sig Dispense Refill    clonazePAM (KLONOPIN) 2 MG Tab Take 1 tablet (2 mg total) by mouth 2 (two) times daily. 60 tablet 5    lamoTRIgine (LAMICTAL) 200 MG tablet Take 1 tablet (200 mg total) by mouth 2 (two) times daily. 180 tablet 3    levETIRAcetam (KEPPRA) 750 MG Tab Take 1 tablet (750 mg total) by mouth 4 (four) times daily. No further refill without appointment 360 tablet 3    levothyroxine (SYNTHROID) 50 MCG tablet Take 1 tablet (50 mcg total) by mouth once daily. 90 tablet 3    PROLIA 60 mg/mL Syrg       [DISCONTINUED] ascorbic acid (VITAMIN C) 1000 MG tablet Take 1,000 mg by mouth once daily.      [DISCONTINUED] cyanocobalamin (VITAMIN B-12) 1000 MCG tablet Take 100 mcg by mouth once daily.       No facility-administered encounter medications on file as of 3/25/2025.

## 2025-03-26 ENCOUNTER — RESULTS FOLLOW-UP (OUTPATIENT)
Dept: PRIMARY CARE CLINIC | Facility: CLINIC | Age: 75
End: 2025-03-26

## 2025-05-13 ENCOUNTER — TELEPHONE (OUTPATIENT)
Dept: PRIMARY CARE CLINIC | Facility: CLINIC | Age: 75
End: 2025-05-13
Payer: MEDICARE

## 2025-05-13 NOTE — TELEPHONE ENCOUNTER
----- Message from Lucille sent at 5/13/2025  9:44 AM CDT -----  Contact: Self/ [phone]  Caller is requesting an earlier appointment then we can schedule.  Caller is requesting a message be sent to the provider.If this is for urgent care symptoms, did you offer other providers at this location, providers at other locations, or [facility]? (yes, no, n/a):  n/aIf this is for the patients physical, did you offer to schedule next available and put on wait list, or to see NP or PA for their physical?  (yes, no, n/a):  n/aWhen is the next available appointment with their provider:  9/8 Reason for the appointment:  Annual Patient preference of timeframe to be scheduled:  Would the patient like a call back, or a response through their [facility] portal?:   Call back Comments:  pt said that she is would like to know if she can be seen before September pt declined to see the NP she would like to see her doctor . Please advise

## 2025-05-23 ENCOUNTER — LAB VISIT (OUTPATIENT)
Dept: LAB | Facility: HOSPITAL | Age: 75
End: 2025-05-23
Attending: INTERNAL MEDICINE
Payer: MEDICARE

## 2025-05-23 ENCOUNTER — OFFICE VISIT (OUTPATIENT)
Dept: PRIMARY CARE CLINIC | Facility: CLINIC | Age: 75
End: 2025-05-23
Payer: MEDICARE

## 2025-05-23 VITALS
WEIGHT: 119.69 LBS | BODY MASS INDEX: 22.6 KG/M2 | DIASTOLIC BLOOD PRESSURE: 64 MMHG | SYSTOLIC BLOOD PRESSURE: 130 MMHG | HEIGHT: 61 IN | HEART RATE: 84 BPM | OXYGEN SATURATION: 97 %

## 2025-05-23 DIAGNOSIS — Z01.818 PREOP EXAMINATION: Primary | ICD-10-CM

## 2025-05-23 DIAGNOSIS — E03.9 HYPOTHYROIDISM (ACQUIRED): ICD-10-CM

## 2025-05-23 DIAGNOSIS — H26.9 CATARACT, UNSPECIFIED CATARACT TYPE, UNSPECIFIED LATERALITY: ICD-10-CM

## 2025-05-23 DIAGNOSIS — M81.0 OSTEOPOROSIS, UNSPECIFIED OSTEOPOROSIS TYPE, UNSPECIFIED PATHOLOGICAL FRACTURE PRESENCE: ICD-10-CM

## 2025-05-23 DIAGNOSIS — G40.209 PARTIAL EPILEPSY WITH IMPAIRMENT OF CONSCIOUSNESS: ICD-10-CM

## 2025-05-23 DIAGNOSIS — E83.52 HYPERCALCEMIA: ICD-10-CM

## 2025-05-23 DIAGNOSIS — E78.2 HYPERLIPIDEMIA, MIXED: ICD-10-CM

## 2025-05-23 LAB
ALBUMIN SERPL BCP-MCNC: 3.8 G/DL (ref 3.5–5.2)
ALP SERPL-CCNC: 104 UNIT/L (ref 40–150)
ALT SERPL W/O P-5'-P-CCNC: 19 UNIT/L (ref 10–44)
ANION GAP (OHS): 9 MMOL/L (ref 8–16)
AST SERPL-CCNC: 24 UNIT/L (ref 11–45)
BILIRUB SERPL-MCNC: 0.7 MG/DL (ref 0.1–1)
BUN SERPL-MCNC: 14 MG/DL (ref 8–23)
CALCIUM SERPL-MCNC: 9.5 MG/DL (ref 8.7–10.5)
CHLORIDE SERPL-SCNC: 99 MMOL/L (ref 95–110)
CO2 SERPL-SCNC: 29 MMOL/L (ref 23–29)
CREAT SERPL-MCNC: 0.8 MG/DL (ref 0.5–1.4)
GFR SERPLBLD CREATININE-BSD FMLA CKD-EPI: >60 ML/MIN/1.73/M2
GLUCOSE SERPL-MCNC: 81 MG/DL (ref 70–110)
POTASSIUM SERPL-SCNC: 4.3 MMOL/L (ref 3.5–5.1)
PROT SERPL-MCNC: 6.9 GM/DL (ref 6–8.4)
SODIUM SERPL-SCNC: 137 MMOL/L (ref 136–145)

## 2025-05-23 PROCEDURE — 82040 ASSAY OF SERUM ALBUMIN: CPT

## 2025-05-23 PROCEDURE — 36415 COLL VENOUS BLD VENIPUNCTURE: CPT

## 2025-05-23 PROCEDURE — 99213 OFFICE O/P EST LOW 20 MIN: CPT | Mod: PBBFAC | Performed by: INTERNAL MEDICINE

## 2025-05-23 PROCEDURE — 99999 PR PBB SHADOW E&M-EST. PATIENT-LVL III: CPT | Mod: PBBFAC,,, | Performed by: INTERNAL MEDICINE

## 2025-05-23 NOTE — PROGRESS NOTES
[facility]    Chief Complaint      Chief Complaint   Patient presents with    Pre-op Exam     Cataract        History of Present Illness      History of Present Illness    CHIEF COMPLAINT:  Ms. Duarte presents today for cataract surgery clearance.    MUSCULOSKELETAL:  She reports progressive kyphosis causing postural changes, including shifted body alignment and anterior lean. She experiences associated pain with this condition.    RESPIRATORY:  She experiences dyspnea with fast walking, particularly when turning corners. Her walking distance is limited to the end of the plascecnia due to shortness of breath.    MEDICATIONS:  She continues Lamotrigine and Keppra without changes. Last Prolia injection was in April. She takes vitamin D supplements and has increased calcium intake by 50%.    IMAGING:  Recent mammogram resulted in pain and bruising that persisted for two days post-procedure.      ROS:  Respiratory: +exertional dyspnea  Musculoskeletal: +pain with movement, +leans right  Breasts: +breast pain       Denies chest pain shortness of breath at rest.  Able to walk a city block and climb a flight of stairs without chest pain.  Does have shortness of breath with exertion due to changes in posture and body habitus from progressive scoliosis/kyphosis  Hypothyroidism: Controlled on synthroid.  TSH 2.63.    Epilepsy: Controlled on lamotrigine, keppra, clonazepam.  Sees Dr. Thomas. No recent seizure.  Osteoporosis: Off prolia, last shot 4/2025.  DEXA UTD.  Flu shot UTD.  TDAP 2020. Pneumovax UTD. COVID vaccine UTD.  Mammogram 2025, Dr. Sheets.  DEXA 3/2024, Dr. Sheets.  Cscope 2022, Dr. Michelle, no polyps, 5 yr interval for previous hx of polyps.      Assessment/Plan     Onelia Duarte is a 75 y.o.female with:    Assessment & Plan    Cleared patient for cataract surgery with Dr. Barnett and ordered fax of clearance for upcoming procedure.  Attributed shortness of breath and  kyphosis to chest cavity compression due to scoliosis and kyphosis limiting lung expansion.  Considered continuation of Prolia treatment, noting last injection was in April.  Determined calcium levels not critically elevated to cause concern.  Noted that at age 77, screening for breast cancer typically stops.    KYPHOSIS:  - Monitored the patient's kyphosis which is progressive with no cure.  - Evaluated how it is causing limited lung space due to diaphragm and rib cage changes, affecting lung function.  - Advised the patient to continue walking for exercise despite increasing difficulty, as this will help maintain body strength.    SHORTNESS OF BREATH:  - Monitored and evaluated the patient's shortness of breath when walking fast, which is directly related to the limited lung expansion caused by kyphosis.    CALCIUM MANAGEMENT:  - Explained that elevated calcium levels can affect nerve function and cause muscle spasms.  - Discussed the importance of maintaining appropriate calcium levels, especially following Prolia discontinuation.  - Recommend continuing calcium and vitamin D supplements.    CATARACT:  - Cleared the patient for cataract procedure.    FOLLOW-UP:  - Follow up in January of next year.  - Cancelled previously scheduled September appointment.         1. Preop examination  2. Cataract, unspecified cataract type, unspecified laterality  Medically cleared for low risk procedure with intermediate cardiac risk profile (due to age).  3. Hyperlipidemia, mixed  Continue current meds.    4. Hypothyroidism (acquired)  Continue current meds.    5. Partial epilepsy with impairment of consciousness  Continue current meds.    6. Osteoporosis, unspecified osteoporosis type, unspecified pathological fracture presence  Continue calcium supplement    Chronic conditions status updated as per HPI.  Other than changes above, cont current medications and maintain follow up with specialists.  No follow-ups on file.    No  future appointments.          Past Medical History:  Past Medical History:   Diagnosis Date    Osteoporosis     Seizures        Past Surgical History:   has a past surgical history that includes Hysterectomy; Colonoscopy (07/24/2019); Knee surgery (Right, 2019); Eye surgery (2010 (estimate)); Fracture surgery (9/19/2022); and Tonsillectomy (1955).    Family History:  family history includes Cancer in her father; Hearing loss in her mother.     Social History:  Social History[1]    Medications:  Encounter Medications[2]    Allergies:  Review of patient's allergies indicates:   Allergen Reactions    Codeine Nausea And Vomiting    Doxycycline hyclate Nausea And Vomiting    Oxycodone-acetaminophen Nausea And Vomiting    Percodan lakeshia Nausea And Vomiting       Health Maintenance:  Immunization History   Administered Date(s) Administered    COVID-19, MRNA, LN-S, PF (Pfizer) (Purple Cap) 02/26/2021, 03/19/2021, 10/06/2021    COVID-19, mRNA, LNP-S, PF (Moderna) Ages 12+ 12/13/2024    COVID-19, mRNA, LNP-S, bivalent booster, PF (PFIZER OMICRON) 10/04/2022    Influenza (FLUAD) - Quadrivalent - Adjuvanted - PF *Preferred* (65+) 10/07/2020, 10/26/2021, 09/02/2023    Influenza - Trivalent - Fluarix, Flulaval, Fluzone, Afluria - PF 10/06/2015, 12/28/2016    Influenza - Trivalent - Fluzone High Dose - PF (65 years and older) 12/05/2017, 08/21/2018, 11/08/2019, 10/07/2020, 10/03/2022, 09/27/2024    PPD Test 10/03/2022    Pneumococcal Conjugate - 13 Valent 07/30/2018    Pneumococcal Polysaccharide - 23 Valent 12/06/2009, 07/06/2017    RSVpreF (Arexvy) 04/12/2024    Tdap 12/19/2020, 05/29/2023    Zoster Recombinant 09/15/2024, 11/15/2024      Health Maintenance   Topic Date Due    Hepatitis C Screening  Never done    Mammogram  05/19/2026    DEXA Scan  10/25/2027    Lipid Panel  01/29/2029    Colorectal Cancer Screening  08/11/2032    TETANUS VACCINE  05/29/2033    Shingles Vaccine  Completed    Influenza Vaccine  Completed     "COVID-19 Vaccine  Completed    RSV Vaccine (Age 60+ and Pregnant patients)  Completed    Pneumococcal Vaccines (Age 50+)  Completed        Physical Exam      Vital Signs  Pulse: 84  SpO2: 97 %  BP: 130/64  BP Location: Right arm  Patient Position: Sitting  Pain Score: 0-No pain  Height and Weight  Height: 5' 1" (154.9 cm)  Weight: 54.3 kg (119 lb 11.4 oz)  BSA (Calculated - sq m): 1.53 sq meters  BMI (Calculated): 22.6  Weight in (lb) to have BMI = 25: 132]    Physical Exam    General: No acute distress. Well-developed. Well-nourished.  Eyes: EOMI. Sclerae anicteric.  HENT: Normocephalic. Atraumatic. Nares patent. Moist oral mucosa.  Ears: Bilateral TMs clear. Bilateral EACs clear.  Cardiovascular: Regular rate. Regular rhythm. No murmurs. No rubs. No gallops. Normal S1, S2.  Respiratory: Normal respiratory effort. Clear to auscultation bilaterally. No rales. No rhonchi. No wheezing.  Abdomen: Soft. Non-tender. Non-distended. Normoactive bowel sounds.  Musculoskeletal: No  obvious deformity.  Extremities: No lower extremity edema.  Neurological: Alert & oriented x3. No slurred speech. Normal gait.  Psychiatric: Normal mood. Normal affect. Good insight. Good judgment.  Skin: Warm. Dry. No rash.         Physical Exam    Laboratory:    Results              CBC:  Recent Labs   Lab 09/28/22  0455 12/27/22  0946 01/29/24  1014   WBC 6.4 6.2 6.4   RBC  --  3.60 L  --    Hemoglobin 10.6 L 11.5 L 11.0 L   Hematocrit 30.9 L 33.9 L 32.4 L   Platelets  --  275  --    MCV 96.2 94.3 96.7   MCH 33.0 31.9 32.7   MCHC 34.3 33.8 33.8     CMP:  Recent Labs   Lab 12/27/22  0946 01/29/24  1014 01/29/24  1014 04/15/24  0958 10/14/24  1547 05/06/25  1022   Glucose 95  --   --   --   --   --    Calcium 9.7 10.1   < > 11.1 H   < > 11.1 H   Albumin  --  4.4   < > 4.5  --   --    ALBUMIN 4.4  --   --   --   --   --    Total Protein 6.7  --   --   --   --   --    Sodium 139 139   < > 141  --   --    Potassium 4.5 4.1   < > 3.9  --   --    CO2 " 30  --   --   --   --   --    Carbon Dioxide  --  29   < > 35 H  --   --    Chloride 100  --   --   --   --   --    BUN 21.0 17.0   < > 18.0  --   --    Alkaline Phosphatase 112  --   --   --   --   --    ALT 17 20   < > 18  --   --    AST 22 23   < > 24  --   --    Total Bilirubin 0.5 0.7  --  0.9  --   --     < > = values in this interval not displayed.     URINALYSIS:       LIPIDS:  Recent Labs   Lab 12/27/22  0000 12/27/22  0946 01/29/24  1014   TSH  --  2.79 2.63   HDL 88 A 88 H 80 H   Cholesterol 179 179 154   Triglycerides 75 75 91   LDL Calculated 79 79 56   Hdl/Cholesterol Ratio  --   --  1.93   Non-HDL Cholesterol  --   --  74     TSH:  Recent Labs   Lab 12/27/22  0946 01/29/24  1014   TSH 2.79 2.63     A1C:            This note was generated with the assistance of ambient listening technology. Verbal consent was obtained by the patient and accompanying visitor(s) for the recording of patient appointment to facilitate this note. I attest to having reviewed and edited the generated note for accuracy, though some syntax or spelling errors may persist. Please contact the author of this note for any clarification.      Jostin Downing MD  [facility]                       [1]   Social History  Tobacco Use    Smoking status: Never    Smokeless tobacco: Never   Substance Use Topics    Alcohol use: Never    Drug use: Never   [2]   Outpatient Encounter Medications as of 5/23/2025   Medication Sig Dispense Refill    clonazePAM (KLONOPIN) 2 MG Tab Take 1 tablet (2 mg total) by mouth 2 (two) times daily. 60 tablet 5    lamoTRIgine (LAMICTAL) 200 MG tablet Take 1 tablet (200 mg total) by mouth 2 (two) times daily. 180 tablet 3    levETIRAcetam (KEPPRA) 750 MG Tab Take 1 tablet (750 mg total) by mouth 4 (four) times daily. No further refill without appointment 360 tablet 3    levothyroxine (SYNTHROID) 50 MCG tablet Take 1 tablet (50 mcg total) by mouth once daily. 90 tablet 3    PROLIA 60 mg/mL Syrg        No  facility-administered encounter medications on file as of 5/23/2025.

## 2025-05-27 DIAGNOSIS — G40.209 COMPLEX PARTIAL SEIZURES WITH CONSCIOUSNESS IMPAIRED: ICD-10-CM

## 2025-05-28 ENCOUNTER — RESULTS FOLLOW-UP (OUTPATIENT)
Dept: PRIMARY CARE CLINIC | Facility: CLINIC | Age: 75
End: 2025-05-28

## 2025-05-28 RX ORDER — CLONAZEPAM 2 MG/1
2 TABLET ORAL 2 TIMES DAILY
Qty: 60 TABLET | Refills: 5 | Status: SHIPPED | OUTPATIENT
Start: 2025-05-28

## 2025-05-28 NOTE — TELEPHONE ENCOUNTER
Last Ordered 12/02/2024    Last Appointment:08/15/2024    Upcoming Appointment: no up coming appt

## 2025-06-23 DIAGNOSIS — Z00.00 ENCOUNTER FOR MEDICARE ANNUAL WELLNESS EXAM: ICD-10-CM

## 2025-07-17 ENCOUNTER — TELEPHONE (OUTPATIENT)
Dept: PRIMARY CARE CLINIC | Facility: CLINIC | Age: 75
End: 2025-07-17
Payer: MEDICARE

## 2025-07-17 NOTE — TELEPHONE ENCOUNTER
Copied from CRM #[id number]. Topic: Appointments - Appointment Access  >> Jul 17, 2025 12:45 PM Tammi wrote:    Patient Returning Call        Who Called:pt  Does the patient know what this is regarding?:need nurse to call an make wellness appt  Would the patient rather a call back or a response via [employer]? call  Best Call Back Number:[phone]  Additional Information: call back

## 2025-07-23 ENCOUNTER — PATIENT OUTREACH (OUTPATIENT)
Dept: ADMINISTRATIVE | Facility: HOSPITAL | Age: 75
End: 2025-07-23
Payer: MEDICARE

## 2025-07-25 ENCOUNTER — OFFICE VISIT (OUTPATIENT)
Dept: PRIMARY CARE CLINIC | Facility: CLINIC | Age: 75
End: 2025-07-25
Payer: MEDICARE

## 2025-07-25 VITALS
BODY MASS INDEX: 22.76 KG/M2 | SYSTOLIC BLOOD PRESSURE: 126 MMHG | DIASTOLIC BLOOD PRESSURE: 72 MMHG | OXYGEN SATURATION: 97 % | WEIGHT: 120.56 LBS | HEART RATE: 73 BPM | HEIGHT: 61 IN

## 2025-07-25 DIAGNOSIS — M81.0 OSTEOPOROSIS, UNSPECIFIED OSTEOPOROSIS TYPE, UNSPECIFIED PATHOLOGICAL FRACTURE PRESENCE: ICD-10-CM

## 2025-07-25 DIAGNOSIS — G40.209 PARTIAL EPILEPSY WITH IMPAIRMENT OF CONSCIOUSNESS: ICD-10-CM

## 2025-07-25 DIAGNOSIS — E03.9 HYPOTHYROIDISM (ACQUIRED): Primary | ICD-10-CM

## 2025-07-25 DIAGNOSIS — M40.209 KYPHOSIS, UNSPECIFIED KYPHOSIS TYPE, UNSPECIFIED SPINAL REGION: ICD-10-CM

## 2025-07-25 DIAGNOSIS — E78.2 HYPERLIPIDEMIA, MIXED: ICD-10-CM

## 2025-07-25 DIAGNOSIS — E83.52 HYPERCALCEMIA: ICD-10-CM

## 2025-07-25 PROCEDURE — 99214 OFFICE O/P EST MOD 30 MIN: CPT | Mod: S$PBB,,, | Performed by: NURSE PRACTITIONER

## 2025-07-25 PROCEDURE — 99213 OFFICE O/P EST LOW 20 MIN: CPT | Mod: PBBFAC | Performed by: NURSE PRACTITIONER

## 2025-07-25 PROCEDURE — 99999 PR PBB SHADOW E&M-EST. PATIENT-LVL III: CPT | Mod: PBBFAC,,, | Performed by: NURSE PRACTITIONER

## 2025-07-25 NOTE — PROGRESS NOTES
[facility] Note    Chief Complaint      Chief Complaint   Patient presents with    Annual Exam       History of Present Illness      Onelia Duarte is a 75 y.o. female with chronic conditions of osteoporosis, seizures who presents today for: check up of chronic conditions. Generally feels well. Had cataract surgery in May, did well. Denies chest pain/sob.     Hypothyroidism: Controlled on synthroid.  will update tsh.  Epilepsy: Controlled on lamotrigine, keppra, clonazepam.  Sees Dr. Thomas. No recent seizure.  Osteoporosis: Off prolia, last shot 4/2025.  DEXA UTD.    Flu shot UTD.  TDAP 2020. Pneumovax UTD. COVID vaccine UTD.  Mammogram 2025, Dr. Sheets.  DEXA 10/2024, Dr. Sheets.  Cscope 2022, Dr. Michelle, no polyps, 5 yr interval for previous hx of polyps.     Past Medical History:  Past Medical History:   Diagnosis Date    Osteoporosis     Seizures        Past Surgical History:   has a past surgical history that includes Hysterectomy; Colonoscopy (07/24/2019); Knee surgery (Right, 2019); Eye surgery (2010 (estimate)); Fracture surgery (9/19/2022); and Tonsillectomy (1955).    Family History:  family history includes Cancer in her father; Hearing loss in her mother.     Social History:  Social History[1]    Review of Systems   Constitutional:  Negative for chills and fever.   Respiratory:  Negative for cough and shortness of breath.    Cardiovascular:  Negative for chest pain and palpitations.   Gastrointestinal:  Negative for constipation, diarrhea, nausea and vomiting.   Genitourinary:  Negative for dysuria and hematuria.   Musculoskeletal:  Negative for falls.   Neurological:  Negative for headaches.        Medications:  Encounter Medications[2]    Allergies:  Review of patient's allergies indicates:   Allergen Reactions    Codeine Nausea And Vomiting    Doxycycline hyclate Nausea And Vomiting    Oxycodone-acetaminophen Nausea And Vomiting    Percodan lakeshia Nausea And Vomiting  "      Health Maintenance:  Immunization History   Administered Date(s) Administered    COVID-19, MRNA, LN-S, PF (Pfizer) (Purple Cap) 02/26/2021, 03/19/2021, 10/06/2021    COVID-19, mRNA, LNP-S, PF (Moderna) Ages 12+ 12/13/2024    COVID-19, mRNA, LNP-S, bivalent booster, PF (PFIZER OMICRON) 10/04/2022    Influenza (FLUAD) - Quadrivalent - Adjuvanted - PF *Preferred* (65+) 10/07/2020, 10/26/2021, 09/02/2023    Influenza - Trivalent - Fluarix, Flulaval, Fluzone, Afluria - PF 10/06/2015, 12/28/2016    Influenza - Trivalent - Fluzone High Dose - PF (65 years and older) 12/05/2017, 08/21/2018, 11/08/2019, 10/07/2020, 10/03/2022, 09/27/2024    PPD Test 10/03/2022    Pneumococcal Conjugate - 13 Valent 07/30/2018    Pneumococcal Polysaccharide - 23 Valent 12/06/2009, 07/06/2017    RSVpreF (Arexvy) 04/12/2024    Tdap 12/19/2020, 05/29/2023    Zoster Recombinant 09/15/2024, 11/15/2024      Health Maintenance   Topic Date Due    Hepatitis C Screening  Never done    Influenza Vaccine (1) 09/01/2025    Mammogram  05/19/2026    DEXA Scan  10/25/2026    Lipid Panel  07/29/2030    Colorectal Cancer Screening  08/11/2032    TETANUS VACCINE  05/29/2033    Shingles Vaccine  Completed    COVID-19 Vaccine  Completed    RSV Vaccine (Age 60+ and Pregnant patients)  Completed    Pneumococcal Vaccines (Age 50+)  Completed        Physical Exam      Vital Signs  Pulse: 73  SpO2: 97 %  BP: 126/72  BP Location: Right arm  Patient Position: Sitting  Pain Score: 0-No pain  Height and Weight  Height: 5' 1" (154.9 cm)  Weight: 54.7 kg (120 lb 9.5 oz)  BSA (Calculated - sq m): 1.53 sq meters  BMI (Calculated): 22.8  Weight in (lb) to have BMI = 25: 132]    Physical Exam  Constitutional:       Appearance: She is well-developed.   HENT:      Head: Normocephalic and atraumatic.   Cardiovascular:      Rate and Rhythm: Normal rate and regular rhythm.      Heart sounds: Normal heart sounds. No murmur heard.  Pulmonary:      Effort: Pulmonary effort is " normal. No respiratory distress.      Breath sounds: Normal breath sounds.   Abdominal:      General: There is no distension.      Palpations: Abdomen is soft.      Tenderness: There is no abdominal tenderness. There is no guarding.   Skin:     General: Skin is warm and dry.   Neurological:      Mental Status: She is alert. Mental status is at baseline.   Psychiatric:         Behavior: Behavior normal.          Laboratory:  CBC:  Recent Labs   Lab 09/28/22  0455 12/27/22  0946 01/29/24  1014   WBC 6.4 6.2 6.4   RBC  --  3.60 L  --    Hemoglobin 10.6 L 11.5 L 11.0 L   Hematocrit 30.9 L 33.9 L 32.4 L   Platelets  --  275  --    MCV 96.2 94.3 96.7   MCH 33.0 31.9 32.7   MCHC 34.3 33.8 33.8     CMP:  Recent Labs   Lab 04/15/24  0958 10/14/24  1547 05/23/25  0829 07/29/25  0739   Glucose  --    < > 81 92   Calcium 11.1 H   < > 9.5 9.9   Albumin 4.5   < > 3.8 4.3   Protein Total  --    < > 6.9 7.5   Sodium 141   < > 137 142   Potassium 3.9   < > 4.3 4.9   CO2  --    < > 29 27   Carbon Dioxide 35 H  --   --   --    Chloride  --    < > 99 107   BUN 18.0   < > 14 21   ALP  --    < > 104 79   ALT 18   < > 19 26   AST 24   < > 24 32   Total Bilirubin 0.9  --   --   --    Bilirubin Total  --   --  0.7 1.0    < > = values in this interval not displayed.     URINALYSIS:       LIPIDS:  Recent Labs   Lab 12/27/22  0946 01/29/24  1014 07/29/25  0739   TSH 2.79 2.63 1.804   HDL 88 H 80 H  --    HDL Cholesterol  --   --  74   Cholesterol Total  --   --  168   Cholesterol 179 154  --    Triglycerides 75 91  --    Triglyceride  --   --  59   LDL Cholesterol  --   --  82.2   LDL Calculated 79 56  --    HDL/Cholesterol Ratio  --   --  44.0   Hdl/Cholesterol Ratio  --  1.93  --    Non-HDL Cholesterol  --  74  --    Non HDL Cholesterol  --   --  94   Cholesterol/HDL Ratio  --   --  2.3     TSH:  Recent Labs   Lab 12/27/22  0946 01/29/24  1014 07/29/25  0739   TSH 2.79 2.63 1.804     A1C:        Assessment/Plan     Onelia Duarte  is a 75 y.o.female with:    1. Hypothyroidism (acquired)  - TSH; Future  - NT-Pro Natriuretic Peptide; Future    2. Hyperlipidemia, mixed  - Lipid Panel; Future  - stable. Continue to monitor     3. Hypercalcemia  - Comprehensive Metabolic Panel; Future  - most recent levels within normal limits.     4. Partial epilepsy with impairment of consciousness  Stable. Continue current meds.      5. Osteoporosis, unspecified osteoporosis type, unspecified pathological fracture presence  Stable. Uptodate on imaging. Continue to monitor. Continue current meds.      6. Kyphosis, unspecified kyphosis type, unspecified spinal region   Stable.     Chronic conditions status updated as per HPI.  Other than changes above, cont current medications and maintain follow up with specialists.  No follow-ups on file.    No future appointments.    Adrienne Cotaya, FNP [facility]                       [1]   Social History  Tobacco Use    Smoking status: Never    Smokeless tobacco: Never   Substance Use Topics    Alcohol use: Never    Drug use: Never   [2]   Outpatient Encounter Medications as of 7/25/2025   Medication Sig Dispense Refill    clonazePAM (KLONOPIN) 2 MG Tab TAKE 1 TABLET BY MOUTH 2 TIMES DAILY. 60 tablet 5    lamoTRIgine (LAMICTAL) 200 MG tablet Take 1 tablet (200 mg total) by mouth 2 (two) times daily. 180 tablet 3    levETIRAcetam (KEPPRA) 750 MG Tab Take 1 tablet (750 mg total) by mouth 4 (four) times daily. No further refill without appointment 360 tablet 3    levothyroxine (SYNTHROID) 50 MCG tablet Take 1 tablet (50 mcg total) by mouth once daily. 90 tablet 3    [DISCONTINUED] PROLIA 60 mg/mL Syrg        No facility-administered encounter medications on file as of 7/25/2025.

## 2025-07-29 ENCOUNTER — LAB VISIT (OUTPATIENT)
Dept: LAB | Facility: HOSPITAL | Age: 75
End: 2025-07-29
Attending: NURSE PRACTITIONER
Payer: MEDICARE

## 2025-07-29 DIAGNOSIS — E78.2 HYPERLIPIDEMIA, MIXED: ICD-10-CM

## 2025-07-29 DIAGNOSIS — E83.52 HYPERCALCEMIA: ICD-10-CM

## 2025-07-29 DIAGNOSIS — E03.9 HYPOTHYROIDISM (ACQUIRED): ICD-10-CM

## 2025-07-29 LAB
ALBUMIN SERPL BCP-MCNC: 4.3 G/DL (ref 3.5–5.2)
ALP SERPL-CCNC: 79 UNIT/L (ref 40–150)
ALT SERPL W/O P-5'-P-CCNC: 26 UNIT/L (ref 0–55)
ANION GAP (OHS): 8 MMOL/L (ref 8–16)
AST SERPL-CCNC: 32 UNIT/L (ref 0–50)
BILIRUB SERPL-MCNC: 1 MG/DL (ref 0.1–1)
BUN SERPL-MCNC: 21 MG/DL (ref 8–23)
CALCIUM SERPL-MCNC: 9.9 MG/DL (ref 8.7–10.5)
CHLORIDE SERPL-SCNC: 107 MMOL/L (ref 95–110)
CHOLEST SERPL-MCNC: 168 MG/DL (ref 120–199)
CHOLEST/HDLC SERPL: 2.3 {RATIO} (ref 2–5)
CO2 SERPL-SCNC: 27 MMOL/L (ref 23–29)
CREAT SERPL-MCNC: 0.7 MG/DL (ref 0.5–1.4)
GFR SERPLBLD CREATININE-BSD FMLA CKD-EPI: >60 ML/MIN/1.73/M2
GLUCOSE SERPL-MCNC: 92 MG/DL (ref 70–110)
HDLC SERPL-MCNC: 74 MG/DL (ref 40–75)
HDLC SERPL: 44 % (ref 20–50)
LDLC SERPL CALC-MCNC: 82.2 MG/DL (ref 63–159)
NONHDLC SERPL-MCNC: 94 MG/DL
NT-PROBNP SERPL-MCNC: 116 PG/ML
POTASSIUM SERPL-SCNC: 4.9 MMOL/L (ref 3.5–5.1)
PROT SERPL-MCNC: 7.5 GM/DL (ref 6–8.4)
SODIUM SERPL-SCNC: 142 MMOL/L (ref 136–145)
TRIGL SERPL-MCNC: 59 MG/DL (ref 30–150)
TSH SERPL-ACNC: 1.8 UIU/ML (ref 0.4–4)

## 2025-07-29 PROCEDURE — 80061 LIPID PANEL: CPT

## 2025-07-29 PROCEDURE — 82040 ASSAY OF SERUM ALBUMIN: CPT

## 2025-07-29 PROCEDURE — 83880 ASSAY OF NATRIURETIC PEPTIDE: CPT

## 2025-07-29 PROCEDURE — 36415 COLL VENOUS BLD VENIPUNCTURE: CPT

## 2025-07-29 PROCEDURE — 84443 ASSAY THYROID STIM HORMONE: CPT

## 2025-08-23 DIAGNOSIS — G40.209 COMPLEX PARTIAL SEIZURES WITH CONSCIOUSNESS IMPAIRED: ICD-10-CM

## 2025-08-26 RX ORDER — LEVETIRACETAM 750 MG/1
750 TABLET ORAL 4 TIMES DAILY
Qty: 120 TABLET | Refills: 0 | Status: SHIPPED | OUTPATIENT
Start: 2025-08-26

## 2025-08-28 DIAGNOSIS — G40.209 COMPLEX PARTIAL SEIZURES WITH CONSCIOUSNESS IMPAIRED: ICD-10-CM

## 2025-08-29 RX ORDER — LAMOTRIGINE 200 MG/1
200 TABLET ORAL 2 TIMES DAILY
Qty: 180 TABLET | Refills: 0 | Status: SHIPPED | OUTPATIENT
Start: 2025-08-29